# Patient Record
Sex: FEMALE | Race: WHITE | Employment: FULL TIME | URBAN - METROPOLITAN AREA
[De-identification: names, ages, dates, MRNs, and addresses within clinical notes are randomized per-mention and may not be internally consistent; named-entity substitution may affect disease eponyms.]

---

## 2017-01-06 RX ORDER — METFORMIN HYDROCHLORIDE 500 MG/1
TABLET, EXTENDED RELEASE ORAL
Qty: 540 TABLET | Refills: 0 | Status: SHIPPED | OUTPATIENT
Start: 2017-01-06 | End: 2017-01-12

## 2017-01-12 RX ORDER — METFORMIN HYDROCHLORIDE 500 MG/1
TABLET, EXTENDED RELEASE ORAL
Qty: 180 TABLET | Refills: 0 | Status: SHIPPED | OUTPATIENT
Start: 2017-01-12 | End: 2017-10-23

## 2017-02-09 ENCOUNTER — OFFICE VISIT (OUTPATIENT)
Dept: FAMILY MEDICINE CLINIC | Age: 42
End: 2017-02-09

## 2017-02-09 VITALS
BODY MASS INDEX: 45.99 KG/M2 | RESPIRATION RATE: 16 BRPM | HEIGHT: 67 IN | TEMPERATURE: 98.1 F | WEIGHT: 293 LBS | HEART RATE: 100 BPM | SYSTOLIC BLOOD PRESSURE: 130 MMHG | DIASTOLIC BLOOD PRESSURE: 80 MMHG

## 2017-02-09 DIAGNOSIS — G89.29 ELBOW PAIN, CHRONIC, RIGHT: Primary | ICD-10-CM

## 2017-02-09 DIAGNOSIS — M25.521 ELBOW PAIN, CHRONIC, RIGHT: Primary | ICD-10-CM

## 2017-02-09 PROCEDURE — 99213 OFFICE O/P EST LOW 20 MIN: CPT | Performed by: FAMILY MEDICINE

## 2017-03-02 ENCOUNTER — OFFICE VISIT (OUTPATIENT)
Dept: FAMILY MEDICINE CLINIC | Age: 42
End: 2017-03-02

## 2017-03-02 VITALS
WEIGHT: 286 LBS | RESPIRATION RATE: 16 BRPM | TEMPERATURE: 98.6 F | SYSTOLIC BLOOD PRESSURE: 118 MMHG | HEIGHT: 67 IN | DIASTOLIC BLOOD PRESSURE: 80 MMHG | BODY MASS INDEX: 44.89 KG/M2 | HEART RATE: 106 BPM

## 2017-03-02 DIAGNOSIS — G47.33 OBSTRUCTIVE APNEA: Chronic | ICD-10-CM

## 2017-03-02 DIAGNOSIS — Z00.00 WELL ADULT HEALTH CHECK: Primary | ICD-10-CM

## 2017-03-02 DIAGNOSIS — E66.01 OBESITY, CLASS III, BMI 40-49.9 (MORBID OBESITY) (HCC): ICD-10-CM

## 2017-03-02 DIAGNOSIS — E11.9 CONTROLLED TYPE 2 DIABETES MELLITUS WITHOUT COMPLICATION, WITHOUT LONG-TERM CURRENT USE OF INSULIN (HCC): Chronic | ICD-10-CM

## 2017-03-02 DIAGNOSIS — E78.5 HYPERLIPIDEMIA LDL GOAL <100: ICD-10-CM

## 2017-03-02 DIAGNOSIS — K21.9 GASTROESOPHAGEAL REFLUX DISEASE WITHOUT ESOPHAGITIS: Chronic | ICD-10-CM

## 2017-03-02 DIAGNOSIS — J45.30 MILD PERSISTENT ASTHMA WITHOUT COMPLICATION: Chronic | ICD-10-CM

## 2017-03-02 LAB
CREATININE URINE: 238.6 MG/DL (ref 28–259)
HBA1C MFR BLD: 6.5 %
MICROALBUMIN UR-MCNC: 4.6 MG/DL
MICROALBUMIN/CREAT UR-RTO: 19.3 MG/G (ref 0–30)

## 2017-03-02 PROCEDURE — 99395 PREV VISIT EST AGE 18-39: CPT | Performed by: FAMILY MEDICINE

## 2017-03-02 PROCEDURE — 83036 HEMOGLOBIN GLYCOSYLATED A1C: CPT | Performed by: FAMILY MEDICINE

## 2017-03-02 ASSESSMENT — PATIENT HEALTH QUESTIONNAIRE - PHQ9
SUM OF ALL RESPONSES TO PHQ9 QUESTIONS 1 & 2: 0
SUM OF ALL RESPONSES TO PHQ QUESTIONS 1-9: 0
1. LITTLE INTEREST OR PLEASURE IN DOING THINGS: 0
2. FEELING DOWN, DEPRESSED OR HOPELESS: 0

## 2017-03-03 PROBLEM — E11.21 TYPE 2 DIABETES MELLITUS WITH DIABETIC NEPHROPATHY (HCC): Status: ACTIVE | Noted: 2017-03-02

## 2017-03-03 RX ORDER — LISINOPRIL 10 MG/1
10 TABLET ORAL DAILY
Qty: 90 TABLET | Refills: 1 | Status: SHIPPED | OUTPATIENT
Start: 2017-03-03 | End: 2017-05-12

## 2017-03-06 ENCOUNTER — PATIENT MESSAGE (OUTPATIENT)
Dept: FAMILY MEDICINE CLINIC | Age: 42
End: 2017-03-06

## 2017-03-06 RX ORDER — DEXTROMETHORPHAN HYDROBROMIDE AND PROMETHAZINE HYDROCHLORIDE 15; 6.25 MG/5ML; MG/5ML
5 SYRUP ORAL 4 TIMES DAILY PRN
Qty: 240 ML | Refills: 0 | Status: SHIPPED | OUTPATIENT
Start: 2017-03-06 | End: 2017-03-13

## 2017-03-10 ENCOUNTER — OFFICE VISIT (OUTPATIENT)
Dept: FAMILY MEDICINE CLINIC | Age: 42
End: 2017-03-10

## 2017-03-10 VITALS
HEIGHT: 67 IN | BODY MASS INDEX: 45.2 KG/M2 | HEART RATE: 94 BPM | WEIGHT: 288 LBS | DIASTOLIC BLOOD PRESSURE: 72 MMHG | TEMPERATURE: 98.3 F | SYSTOLIC BLOOD PRESSURE: 122 MMHG | RESPIRATION RATE: 18 BRPM

## 2017-03-10 DIAGNOSIS — N93.8 DUB (DYSFUNCTIONAL UTERINE BLEEDING): Primary | ICD-10-CM

## 2017-03-10 PROCEDURE — 99213 OFFICE O/P EST LOW 20 MIN: CPT | Performed by: FAMILY MEDICINE

## 2017-05-06 RX ORDER — MONTELUKAST SODIUM 10 MG/1
TABLET ORAL
Qty: 90 TABLET | Refills: 0 | Status: SHIPPED | OUTPATIENT
Start: 2017-05-06 | End: 2017-07-19 | Stop reason: SDUPTHER

## 2017-05-11 ENCOUNTER — PATIENT MESSAGE (OUTPATIENT)
Dept: FAMILY MEDICINE CLINIC | Age: 42
End: 2017-05-11

## 2017-05-11 DIAGNOSIS — E11.21 TYPE 2 DIABETES MELLITUS WITH DIABETIC NEPHROPATHY, WITHOUT LONG-TERM CURRENT USE OF INSULIN (HCC): Primary | ICD-10-CM

## 2017-05-12 RX ORDER — LOSARTAN POTASSIUM 25 MG/1
25 TABLET ORAL DAILY
Qty: 30 TABLET | Refills: 3 | Status: SHIPPED | OUTPATIENT
Start: 2017-05-12 | End: 2017-08-20 | Stop reason: SDUPTHER

## 2017-06-19 ENCOUNTER — OFFICE VISIT (OUTPATIENT)
Dept: FAMILY MEDICINE CLINIC | Age: 42
End: 2017-06-19

## 2017-06-19 VITALS
HEIGHT: 67 IN | DIASTOLIC BLOOD PRESSURE: 70 MMHG | WEIGHT: 293 LBS | RESPIRATION RATE: 16 BRPM | BODY MASS INDEX: 45.99 KG/M2 | SYSTOLIC BLOOD PRESSURE: 124 MMHG | TEMPERATURE: 99.3 F | HEART RATE: 98 BPM

## 2017-06-19 DIAGNOSIS — Z11.4 SCREENING FOR HIV (HUMAN IMMUNODEFICIENCY VIRUS): ICD-10-CM

## 2017-06-19 DIAGNOSIS — E11.21 TYPE 2 DIABETES MELLITUS WITH DIABETIC NEPHROPATHY, WITHOUT LONG-TERM CURRENT USE OF INSULIN (HCC): Primary | ICD-10-CM

## 2017-06-19 DIAGNOSIS — E11.21 TYPE 2 DIABETES MELLITUS WITH DIABETIC NEPHROPATHY, WITHOUT LONG-TERM CURRENT USE OF INSULIN (HCC): ICD-10-CM

## 2017-06-19 DIAGNOSIS — J45.30 MILD PERSISTENT ASTHMA WITHOUT COMPLICATION: Chronic | ICD-10-CM

## 2017-06-19 DIAGNOSIS — E78.5 HYPERLIPIDEMIA LDL GOAL <100: ICD-10-CM

## 2017-06-19 LAB
A/G RATIO: 1.1 (ref 1.1–2.2)
ALBUMIN SERPL-MCNC: 3.9 G/DL (ref 3.4–5)
ALP BLD-CCNC: 95 U/L (ref 40–129)
ALT SERPL-CCNC: 9 U/L (ref 10–40)
ANION GAP SERPL CALCULATED.3IONS-SCNC: 17 MMOL/L (ref 3–16)
AST SERPL-CCNC: 9 U/L (ref 15–37)
BILIRUB SERPL-MCNC: 0.6 MG/DL (ref 0–1)
BUN BLDV-MCNC: 14 MG/DL (ref 7–20)
CALCIUM SERPL-MCNC: 9.4 MG/DL (ref 8.3–10.6)
CHLORIDE BLD-SCNC: 98 MMOL/L (ref 99–110)
CHOLESTEROL, TOTAL: 206 MG/DL (ref 0–199)
CO2: 22 MMOL/L (ref 21–32)
CREAT SERPL-MCNC: 0.6 MG/DL (ref 0.6–1.1)
GFR AFRICAN AMERICAN: >60
GFR NON-AFRICAN AMERICAN: >60
GLOBULIN: 3.4 G/DL
GLUCOSE BLD-MCNC: 161 MG/DL (ref 70–99)
HDLC SERPL-MCNC: 80 MG/DL (ref 40–60)
LDL CHOLESTEROL CALCULATED: 93 MG/DL
POTASSIUM SERPL-SCNC: 4 MMOL/L (ref 3.5–5.1)
SODIUM BLD-SCNC: 137 MMOL/L (ref 136–145)
TOTAL PROTEIN: 7.3 G/DL (ref 6.4–8.2)
TRIGL SERPL-MCNC: 163 MG/DL (ref 0–150)
VLDLC SERPL CALC-MCNC: 33 MG/DL

## 2017-06-19 PROCEDURE — 99214 OFFICE O/P EST MOD 30 MIN: CPT | Performed by: FAMILY MEDICINE

## 2017-06-20 LAB
ESTIMATED AVERAGE GLUCOSE: 151.3 MG/DL
HBA1C MFR BLD: 6.9 %
HIV-1 AND HIV-2 ANTIBODIES: REACTIVE

## 2017-06-22 ENCOUNTER — TELEPHONE (OUTPATIENT)
Dept: FAMILY MEDICINE CLINIC | Age: 42
End: 2017-06-22

## 2017-06-22 DIAGNOSIS — Z21 HIV TEST POSITIVE (HCC): ICD-10-CM

## 2017-06-22 DIAGNOSIS — Z21 HIV TEST POSITIVE (HCC): Primary | ICD-10-CM

## 2017-06-22 LAB — HIV-1 WESTERN BLOT: ABNORMAL

## 2017-06-23 ENCOUNTER — TELEPHONE (OUTPATIENT)
Dept: FAMILY MEDICINE CLINIC | Age: 42
End: 2017-06-23

## 2017-06-23 DIAGNOSIS — Z21 HIV TEST POSITIVE (HCC): Primary | ICD-10-CM

## 2017-07-06 ENCOUNTER — TELEPHONE (OUTPATIENT)
Dept: FAMILY MEDICINE CLINIC | Age: 42
End: 2017-07-06

## 2017-07-20 RX ORDER — MONTELUKAST SODIUM 10 MG/1
TABLET ORAL
Qty: 90 TABLET | Refills: 0 | Status: SHIPPED | OUTPATIENT
Start: 2017-07-20 | End: 2017-09-29 | Stop reason: SDUPTHER

## 2017-07-26 ENCOUNTER — OFFICE VISIT (OUTPATIENT)
Dept: FAMILY MEDICINE CLINIC | Age: 42
End: 2017-07-26

## 2017-07-26 VITALS
TEMPERATURE: 98.8 F | BODY MASS INDEX: 45.99 KG/M2 | RESPIRATION RATE: 18 BRPM | SYSTOLIC BLOOD PRESSURE: 126 MMHG | HEART RATE: 100 BPM | WEIGHT: 293 LBS | HEIGHT: 67 IN | DIASTOLIC BLOOD PRESSURE: 70 MMHG

## 2017-07-26 DIAGNOSIS — Z21 HIV TEST POSITIVE (HCC): ICD-10-CM

## 2017-07-26 DIAGNOSIS — Z78.9 FALSE POSITIVE SEROLOGY FOR HIV: ICD-10-CM

## 2017-07-26 DIAGNOSIS — N84.1 ENDOCERVICAL POLYP: Primary | ICD-10-CM

## 2017-07-26 DIAGNOSIS — Z01.419 WELL WOMAN EXAM WITH ROUTINE GYNECOLOGICAL EXAM: ICD-10-CM

## 2017-07-26 PROCEDURE — 99214 OFFICE O/P EST MOD 30 MIN: CPT | Performed by: FAMILY MEDICINE

## 2017-07-28 LAB
HPV COMMENT: NORMAL
HPV TYPE 16: NOT DETECTED
HPV TYPE 18: NOT DETECTED
HPVOH (OTHER TYPES): NOT DETECTED

## 2017-07-29 LAB
HIV RNA PCR INTERPRETATION: NOT DETECTED
HIV-1 RNA BY PCR, QN: <1.3 LOG
HIV-1 RNA BY PCR, QN: <20 CPY/ML

## 2017-07-30 PROBLEM — Z78.9 FALSE POSITIVE SEROLOGY FOR HIV: Status: ACTIVE | Noted: 2017-07-30

## 2017-08-11 ENCOUNTER — PATIENT MESSAGE (OUTPATIENT)
Dept: FAMILY MEDICINE CLINIC | Age: 42
End: 2017-08-11

## 2017-08-20 RX ORDER — LOSARTAN POTASSIUM 25 MG/1
TABLET ORAL
Qty: 30 TABLET | Refills: 3 | Status: SHIPPED | OUTPATIENT
Start: 2017-08-20 | End: 2017-11-22 | Stop reason: SDUPTHER

## 2017-09-29 RX ORDER — MONTELUKAST SODIUM 10 MG/1
TABLET ORAL
Qty: 90 TABLET | Refills: 0 | Status: SHIPPED | OUTPATIENT
Start: 2017-09-29 | End: 2017-12-15 | Stop reason: SDUPTHER

## 2017-10-23 RX ORDER — METFORMIN HYDROCHLORIDE 500 MG/1
TABLET, EXTENDED RELEASE ORAL
Qty: 180 TABLET | Refills: 0 | Status: SHIPPED | OUTPATIENT
Start: 2017-10-23 | End: 2018-01-03 | Stop reason: SDUPTHER

## 2017-10-27 RX ORDER — PANTOPRAZOLE SODIUM 40 MG/1
40 TABLET, DELAYED RELEASE ORAL DAILY
Qty: 90 TABLET | Refills: 0 | Status: SHIPPED | OUTPATIENT
Start: 2017-10-27 | End: 2018-01-11 | Stop reason: SDUPTHER

## 2017-10-31 ENCOUNTER — OFFICE VISIT (OUTPATIENT)
Dept: SLEEP MEDICINE | Age: 42
End: 2017-10-31

## 2017-10-31 VITALS
SYSTOLIC BLOOD PRESSURE: 122 MMHG | BODY MASS INDEX: 45.99 KG/M2 | WEIGHT: 293 LBS | OXYGEN SATURATION: 98 % | HEIGHT: 67 IN | HEART RATE: 115 BPM | DIASTOLIC BLOOD PRESSURE: 86 MMHG

## 2017-10-31 DIAGNOSIS — J45.30 MILD PERSISTENT ASTHMA WITHOUT COMPLICATION: Chronic | ICD-10-CM

## 2017-10-31 DIAGNOSIS — J30.2 OTHER SEASONAL ALLERGIC RHINITIS: Chronic | ICD-10-CM

## 2017-10-31 DIAGNOSIS — G47.33 OBSTRUCTIVE SLEEP APNEA SYNDROME: Primary | Chronic | ICD-10-CM

## 2017-10-31 DIAGNOSIS — E66.01 MORBID OBESITY, UNSPECIFIED OBESITY TYPE (HCC): Chronic | ICD-10-CM

## 2017-10-31 DIAGNOSIS — E11.9 CONTROLLED TYPE 2 DIABETES MELLITUS WITHOUT COMPLICATION, WITHOUT LONG-TERM CURRENT USE OF INSULIN (HCC): Chronic | ICD-10-CM

## 2017-10-31 PROCEDURE — 99214 OFFICE O/P EST MOD 30 MIN: CPT | Performed by: NURSE PRACTITIONER

## 2017-10-31 ASSESSMENT — ENCOUNTER SYMPTOMS
ABDOMINAL DISTENTION: 0
SINUS PRESSURE: 0
SHORTNESS OF BREATH: 0
ABDOMINAL PAIN: 0
RHINORRHEA: 0
COUGH: 0
APNEA: 0

## 2017-10-31 ASSESSMENT — SLEEP AND FATIGUE QUESTIONNAIRES
HOW LIKELY ARE YOU TO NOD OFF OR FALL ASLEEP WHILE SITTING AND READING: 1
HOW LIKELY ARE YOU TO NOD OFF OR FALL ASLEEP WHILE SITTING INACTIVE IN A PUBLIC PLACE: 0
HOW LIKELY ARE YOU TO NOD OFF OR FALL ASLEEP WHILE SITTING AND TALKING TO SOMEONE: 0
HOW LIKELY ARE YOU TO NOD OFF OR FALL ASLEEP IN A CAR, WHILE STOPPED FOR A FEW MINUTES IN TRAFFIC: 0
ESS TOTAL SCORE: 6
HOW LIKELY ARE YOU TO NOD OFF OR FALL ASLEEP WHILE SITTING QUIETLY AFTER LUNCH WITHOUT ALCOHOL: 0
HOW LIKELY ARE YOU TO NOD OFF OR FALL ASLEEP WHILE WATCHING TV: 1
HOW LIKELY ARE YOU TO NOD OFF OR FALL ASLEEP WHEN YOU ARE A PASSENGER IN A CAR FOR AN HOUR WITHOUT A BREAK: 2
HOW LIKELY ARE YOU TO NOD OFF OR FALL ASLEEP WHILE LYING DOWN TO REST IN THE AFTERNOON WHEN CIRCUMSTANCES PERMIT: 2

## 2017-10-31 NOTE — PROGRESS NOTES
congestion, nosebleeds, rhinorrhea and sinus pressure. Respiratory: Negative for apnea, cough and shortness of breath. Cardiovascular: Negative for chest pain and palpitations. Gastrointestinal: Negative for abdominal distention and abdominal pain. Neurological: Negative for dizziness and headaches. Social History     Social History    Marital status:      Spouse name: N/A    Number of children: N/A    Years of education: N/A     Occupational History          Social History Main Topics    Smoking status: Never Smoker    Smokeless tobacco: Never Used      Comment: advised not to start    Alcohol use 0.6 oz/week     1 Cans of beer per week    Drug use: No    Sexual activity: Yes     Partners: Male     Birth control/ protection: Pill     Other Topics Concern    Not on file     Social History Narrative    Lives with spouse. Exercise:  Yoga, machines. Seatbelt use: Always       Prior to Admission medications    Medication Sig Start Date End Date Taking?  Authorizing Provider   pantoprazole (PROTONIX) 40 MG tablet TAKE 1 TABLET BY MOUTH DAILY 10/27/17  Yes Hailey North CNP   metFORMIN (GLUCOPHAGE-XR) 500 MG extended release tablet TAKE 2 TABLETS BY MOUTH DAILY WITH BREAKFAST( GENERIC EQUIVALENT FOR GLUCOPHAGE-XR) 10/23/17  Yes Leena Somers MD   montelukast (SINGULAIR) 10 MG tablet TAKE 1 TABLET BY MOUTH EVERY EVENING 9/29/17  Yes Niki Castro MD   losartan (COZAAR) 25 MG tablet TAKE 1 TABLET BY MOUTH DAILY 8/20/17  Yes Leena Somers MD   RECLIPSEN 0.15-30 MG-MCG per tablet TAKE 1 TABLET BY MOUTH DAILY 3/15/17  Yes Leena Somers MD   albuterol sulfate  (90 BASE) MCG/ACT inhaler Inhale 2 puffs into the lungs every 4 hours as needed for Wheezing May substitute for insurance preferred (Ventolin, Proventil, ProAir) 12/16/16 12/16/17 Yes Leena Somers MD   Chlorhexidine Gluconate 2 % LIQD Daily body wash for 14 d 7/15/16  Yes Leena Somers MD   fluticasone (FLONASE) 50 MCG/ACT nasal spray 1 spray by Nasal route daily 11/5/15 2/9/26 Yes Deepa Berkowitz MD   loratadine (CLARITIN) 10 MG tablet Take 10 mg by mouth daily. Yes Historical Provider, MD       Allergies as of 10/31/2017 - Review Complete 10/31/2017   Allergen Reaction Noted    Lisinopril Other (See Comments) 05/12/2017    Sulfa antibiotics Rash 05/04/2011       Patient Active Problem List   Diagnosis    Seborrhea    Dysfunctional uterine bleeding    Abnormal Pap smear- LGSIL 2001    Candidal intertrigo    Urticaria, idiopathic    Low back pain    Allergic rhinitis    Asthma- mild persistent    GERD (gastroesophageal reflux disease)    Well adult health check    Hiatal hernia    Screening for cervical cancer    Obesity, Class III, BMI 40-49.9 (morbid obesity) ([address]    Obstructive apnea- CPAP    Hyperlipidemia LDL goal <100    Type 2 diabetes mellitus with diabetic nephropathy (HCC)    Endocervical polyp    False positive serology for HIV       Past Medical History:   Diagnosis Date    Abnormal Pap smear- LGSIL 2001 5/4/2011    2001, nl since    Chronic folliculitis 9/26/8802    Dysfunctional uterine bleeding     False positive serology for HIV 7/30/2017    Obstructive apnea     Seborrhea     Urticaria, idiopathic        Past Surgical History:   Procedure Laterality Date    MOUTH SURGERY  2016    gingival auto graft       Family History   Problem Relation Age of Onset    Cancer Sister      peritoneal    Diabetes Maternal Grandmother     Other Brother      SARA       Vitals:  Weight BMI   Wt Readings from Last 3 Encounters:   10/31/17 299 lb (135.6 kg)   07/26/17 297 lb (134.7 kg)   06/19/17 298 lb (135.2 kg)    Body mass index is 46.83 kg/m².      BP HR SaO2   BP Readings from Last 3 Encounters:   10/31/17 122/86   07/26/17 126/70   06/19/17 124/70    Pulse Readings from Last 3 Encounters:   10/31/17 115   07/26/17 100   06/19/17 98    SpO2 Readings from Last 3 Encounters:   10/31/17 98%

## 2017-11-22 RX ORDER — LOSARTAN POTASSIUM 25 MG/1
TABLET ORAL
Qty: 30 TABLET | Refills: 0 | Status: SHIPPED | OUTPATIENT
Start: 2017-11-22 | End: 2017-12-20 | Stop reason: SDUPTHER

## 2017-11-30 RX ORDER — DESOGESTREL AND ETHINYL ESTRADIOL 0.15-0.03
KIT ORAL
Qty: 84 TABLET | Refills: 0 | Status: SHIPPED | OUTPATIENT
Start: 2017-11-30 | End: 2018-02-07 | Stop reason: SDUPTHER

## 2017-12-15 RX ORDER — MONTELUKAST SODIUM 10 MG/1
TABLET ORAL
Qty: 90 TABLET | Refills: 1 | Status: SHIPPED | OUTPATIENT
Start: 2017-12-15 | End: 2018-06-12 | Stop reason: SDUPTHER

## 2017-12-20 RX ORDER — LOSARTAN POTASSIUM 25 MG/1
TABLET ORAL
Qty: 30 TABLET | Refills: 0 | Status: SHIPPED | OUTPATIENT
Start: 2017-12-20 | End: 2018-01-13 | Stop reason: SDUPTHER

## 2018-01-03 RX ORDER — METFORMIN HYDROCHLORIDE 500 MG/1
TABLET, EXTENDED RELEASE ORAL
Qty: 180 TABLET | Refills: 0 | Status: SHIPPED | OUTPATIENT
Start: 2018-01-03 | End: 2018-05-03 | Stop reason: SDUPTHER

## 2018-01-12 RX ORDER — PANTOPRAZOLE SODIUM 40 MG/1
40 TABLET, DELAYED RELEASE ORAL DAILY
Qty: 90 TABLET | Refills: 0 | Status: SHIPPED | OUTPATIENT
Start: 2018-01-12 | End: 2019-05-04 | Stop reason: SDUPTHER

## 2018-01-15 RX ORDER — LOSARTAN POTASSIUM 25 MG/1
TABLET ORAL
Qty: 30 TABLET | Refills: 0 | Status: SHIPPED | OUTPATIENT
Start: 2018-01-15 | End: 2018-02-07 | Stop reason: SDUPTHER

## 2018-01-18 ENCOUNTER — OFFICE VISIT (OUTPATIENT)
Dept: FAMILY MEDICINE CLINIC | Age: 43
End: 2018-01-18

## 2018-01-18 VITALS
HEART RATE: 110 BPM | BODY MASS INDEX: 45.99 KG/M2 | WEIGHT: 293 LBS | SYSTOLIC BLOOD PRESSURE: 130 MMHG | DIASTOLIC BLOOD PRESSURE: 80 MMHG | RESPIRATION RATE: 18 BRPM | HEIGHT: 67 IN | TEMPERATURE: 98.4 F

## 2018-01-18 DIAGNOSIS — L30.4 INTERTRIGO: ICD-10-CM

## 2018-01-18 DIAGNOSIS — J06.9 VIRAL URI: Primary | ICD-10-CM

## 2018-01-18 DIAGNOSIS — J45.20 MILD INTERMITTENT ASTHMA WITHOUT COMPLICATION: Chronic | ICD-10-CM

## 2018-01-18 PROCEDURE — 99213 OFFICE O/P EST LOW 20 MIN: CPT | Performed by: FAMILY MEDICINE

## 2018-01-18 RX ORDER — CLOTRIMAZOLE AND BETAMETHASONE DIPROPIONATE 10; .64 MG/G; MG/G
CREAM TOPICAL
Qty: 60 G | Refills: 0 | Status: SHIPPED | OUTPATIENT
Start: 2018-01-18 | End: 2021-01-19 | Stop reason: SDUPTHER

## 2018-01-18 RX ORDER — DEXTROMETHORPHAN HYDROBROMIDE AND PROMETHAZINE HYDROCHLORIDE 15; 6.25 MG/5ML; MG/5ML
5 SYRUP ORAL 4 TIMES DAILY PRN
Qty: 240 ML | Refills: 0 | Status: SHIPPED | OUTPATIENT
Start: 2018-01-18 | End: 2018-01-25

## 2018-01-18 NOTE — PROGRESS NOTES
Readings from Last 3 Encounters:   01/18/18 130/80   10/31/17 122/86   07/26/17 126/70     GENERAL: well-developed, well-nourished, alert, no distress  EYES: negative findings: lids and lashes normal and conjunctivae and sclerae normal  ENT: normal TM's and external ear canals both ears  · External nose and ears appear normal  · Pharynx: red. Exudates: None  · Lips, mucosa, and tongue normal and teeth normal  · Hearing grossly normal  NECK: Supple, symmetrical, trachea midline  · Thyroid not enlarged, symmetric, no tenderness/mass/nodules  · no cervical nodes, no supraclavicular nodes  LUNGS:  Breathing unlabored  · clear to auscultation bilaterally,  good air movement  CARDIOVASC: regular rate and rhythm    Assessment/Plan:     1. Viral URI  promethazine-dextromethorphan (PROMETHAZINE-DM) 6.25-15 MG/5ML syrup   2. Mild intermittent asthma without complication  promethazine-dextromethorphan (PROMETHAZINE-DM) 6.25-15 MG/5ML syrup   3. Intertrigo  clotrimazole-betamethasone (LOTRISONE) 1-0.05 % cream   Explained lack of efficacy of antibiotics in viral disease. May take Mucinex (guaifenisen) and Robitussin DM (guafenisen, dextromethorphan) for cough and congestion. May also try Vicks Vaporub. May take ibuprofen (Motrin, Advil) 200 mg tabs up to 4 tabs every 8 hours. May also take acetaminophen (Tylenol) as instructed on packaging. Please call if symptoms getting worse. Please call if cold symptoms getting worse after 7 days from onset, last longer than 10 days, having high fevers, having trouble breathing in chest or extremely ill.

## 2018-01-31 ENCOUNTER — OFFICE VISIT (OUTPATIENT)
Dept: FAMILY MEDICINE CLINIC | Age: 43
End: 2018-01-31

## 2018-01-31 VITALS
DIASTOLIC BLOOD PRESSURE: 80 MMHG | WEIGHT: 293 LBS | HEIGHT: 67 IN | RESPIRATION RATE: 16 BRPM | HEART RATE: 86 BPM | BODY MASS INDEX: 45.99 KG/M2 | SYSTOLIC BLOOD PRESSURE: 124 MMHG | TEMPERATURE: 98.6 F

## 2018-01-31 DIAGNOSIS — R21 RASH AND NONSPECIFIC SKIN ERUPTION: ICD-10-CM

## 2018-01-31 DIAGNOSIS — J30.9 CHRONIC ALLERGIC RHINITIS, UNSPECIFIED SEASONALITY, UNSPECIFIED TRIGGER: Chronic | ICD-10-CM

## 2018-01-31 DIAGNOSIS — G47.33 OBSTRUCTIVE APNEA: Chronic | ICD-10-CM

## 2018-01-31 DIAGNOSIS — R19.5 CHANGE IN CONSISTENCY OF STOOL: ICD-10-CM

## 2018-01-31 DIAGNOSIS — R19.7 INTERMITTENT DIARRHEA: ICD-10-CM

## 2018-01-31 DIAGNOSIS — E78.5 HYPERLIPIDEMIA LDL GOAL <100: ICD-10-CM

## 2018-01-31 DIAGNOSIS — J45.20 MILD INTERMITTENT ASTHMA WITHOUT COMPLICATION: Chronic | ICD-10-CM

## 2018-01-31 DIAGNOSIS — E11.21 TYPE 2 DIABETES MELLITUS WITH DIABETIC NEPHROPATHY, WITHOUT LONG-TERM CURRENT USE OF INSULIN (HCC): Primary | ICD-10-CM

## 2018-01-31 LAB — HBA1C MFR BLD: 6.8 %

## 2018-01-31 PROCEDURE — 99214 OFFICE O/P EST MOD 30 MIN: CPT | Performed by: FAMILY MEDICINE

## 2018-01-31 PROCEDURE — 83036 HEMOGLOBIN GLYCOSYLATED A1C: CPT | Performed by: FAMILY MEDICINE

## 2018-01-31 RX ORDER — DICYCLOMINE HCL 20 MG
20 TABLET ORAL 3 TIMES DAILY PRN
Qty: 30 TABLET | Refills: 0 | Status: SHIPPED | OUTPATIENT
Start: 2018-01-31 | End: 2018-08-30 | Stop reason: SDUPTHER

## 2018-01-31 RX ORDER — ASPIRIN 81 MG/1
81 TABLET ORAL DAILY
Qty: 30 TABLET | Refills: 11 | COMMUNITY
Start: 2018-01-31 | End: 2019-06-18

## 2018-01-31 RX ORDER — CLEMASTINE FUMARATE 1.34 MG
1.34 TABLET ORAL DAILY PRN
Qty: 30 TABLET | Refills: 3 | Status: SHIPPED | OUTPATIENT
Start: 2018-01-31 | End: 2018-04-19 | Stop reason: ALTCHOICE

## 2018-01-31 RX ORDER — FEXOFENADINE HCL 180 MG/1
180 TABLET ORAL DAILY
COMMUNITY
End: 2018-04-19 | Stop reason: SINTOL

## 2018-01-31 NOTE — PATIENT INSTRUCTIONS
IBS?  No. Foods don't cause IBS. But some foods may make you feel worse. Foods that may make symptoms worse include the following:  Drinks with caffeine, such as coffee, tea or soda   Milk products   Alcohol   Chocolate   Wheat, rye or barley   Keeping a diary for a few weeks may be a good way to find out if a food bothers you. Record what you eat and what your symptoms are. If you notice a pattern or think a food makes you feel worse, don't eat it. But don't cut out foods unless they have caused you problems more than once. If gas is a problem for you, you might want to avoid foods that tend to make gas worse. These include beans, cabbage and some fruits. If milk and other dairy products bother you, you may have lactose intolerance. Lactose intolerance means that your body can't digest lactose (the sugar in milk). If this seems to be the case, you may need to limit the amount of milk and milk products in your diet. Talk to your family doctor if you think you have trouble digesting dairy products. How can stress affect IBS? Stress may trigger symptoms in people who have IBS. Talk to your family doctor about ways to deal with stress, such as exercise, relaxation training or meditation. He or she may have some suggestions or may refer you to someone who can give you some ideas. Your doctor may also suggest that you talk to a counselor about things that are bothering you. Diagnosis & Tests   How is IBS diagnosed? Your doctor may start by asking you questions about your symptoms. If your symptoms have had a pattern over time, the pattern may make it clear to your doctor that IBS is the cause. If your symptoms have just started, something else may be the cause. Your doctor may need to do some tests, such as a blood test or colonoscopy, to make sure that your symptoms aren't caused by something else. Treatment   How is IBS treated? There is no cure for IBS.  The best way to handle your symptoms is to life.    What if IBS interferes with my daily activities? IBS may have caused you to avoid doing certain things, like going out or going to work or school. While it may take some time for your efforts to pay off, you may find new freedom by following a plan that includes a healthy diet, learning new ways to deal with your stress and avoiding foods that may make your symptoms worse. Tips on controlling IBS  Eat a varied healthy diet and avoid foods high in fat. Drink plenty of water. Try eating 6 small meals a day rather than 3 larger ones. Learn new and better ways to deal with your stress. Avoid using laxatives. They may weaken your intestines and cause you to be dependent on them.

## 2018-02-07 RX ORDER — LOSARTAN POTASSIUM 25 MG/1
TABLET ORAL
Qty: 30 TABLET | Refills: 5 | Status: SHIPPED | OUTPATIENT
Start: 2018-02-07 | End: 2018-09-26 | Stop reason: SDUPTHER

## 2018-02-07 RX ORDER — DESOGESTREL AND ETHINYL ESTRADIOL 0.15-0.03
KIT ORAL
Qty: 84 TABLET | Refills: 1 | Status: SHIPPED | OUTPATIENT
Start: 2018-02-07 | End: 2018-10-21 | Stop reason: SDUPTHER

## 2018-03-28 RX ORDER — LANCETS
EACH MISCELLANEOUS
Qty: 100 EACH | Refills: 3 | Status: SHIPPED | OUTPATIENT
Start: 2018-03-28 | End: 2018-04-23 | Stop reason: SDUPTHER

## 2018-04-09 ENCOUNTER — OFFICE VISIT (OUTPATIENT)
Dept: FAMILY MEDICINE CLINIC | Age: 43
End: 2018-04-09

## 2018-04-09 VITALS
BODY MASS INDEX: 47.14 KG/M2 | HEART RATE: 108 BPM | RESPIRATION RATE: 12 BRPM | DIASTOLIC BLOOD PRESSURE: 80 MMHG | SYSTOLIC BLOOD PRESSURE: 126 MMHG | TEMPERATURE: 98.3 F | WEIGHT: 293 LBS | OXYGEN SATURATION: 98 %

## 2018-04-09 DIAGNOSIS — S46.812A STRAIN OF LEFT TRAPEZIUS MUSCLE, INITIAL ENCOUNTER: ICD-10-CM

## 2018-04-09 DIAGNOSIS — J40 BRONCHITIS: Primary | ICD-10-CM

## 2018-04-09 DIAGNOSIS — J45.21 MILD INTERMITTENT ASTHMA WITH ACUTE EXACERBATION: Chronic | ICD-10-CM

## 2018-04-09 PROCEDURE — 99214 OFFICE O/P EST MOD 30 MIN: CPT | Performed by: NURSE PRACTITIONER

## 2018-04-09 RX ORDER — PREDNISONE 20 MG/1
20 TABLET ORAL 2 TIMES DAILY
Qty: 10 TABLET | Refills: 0 | Status: SHIPPED | OUTPATIENT
Start: 2018-04-09 | End: 2018-04-14

## 2018-04-09 RX ORDER — AZITHROMYCIN 250 MG/1
TABLET, FILM COATED ORAL
Qty: 1 PACKET | Refills: 0 | Status: SHIPPED | OUTPATIENT
Start: 2018-04-09 | End: 2018-04-19 | Stop reason: ALTCHOICE

## 2018-04-09 ASSESSMENT — ENCOUNTER SYMPTOMS
WHEEZING: 1
VOMITING: 0
DIARRHEA: 0
RHINORRHEA: 1
SHORTNESS OF BREATH: 1
SINUS PRESSURE: 1
NAUSEA: 0
COUGH: 1
SORE THROAT: 0

## 2018-04-10 ENCOUNTER — PAT TELEPHONE (OUTPATIENT)
Dept: PREADMISSION TESTING | Age: 43
End: 2018-04-10

## 2018-04-10 VITALS — BODY MASS INDEX: 45.99 KG/M2 | HEIGHT: 67 IN | WEIGHT: 293 LBS

## 2018-04-12 ENCOUNTER — HOSPITAL ENCOUNTER (OUTPATIENT)
Dept: ENDOSCOPY | Age: 43
Discharge: HOME | End: 2018-04-12
Attending: INTERNAL MEDICINE | Admitting: INTERNAL MEDICINE

## 2018-04-12 VITALS
DIASTOLIC BLOOD PRESSURE: 92 MMHG | TEMPERATURE: 97.5 F | HEIGHT: 67 IN | OXYGEN SATURATION: 98 % | WEIGHT: 293 LBS | RESPIRATION RATE: 18 BRPM | SYSTOLIC BLOOD PRESSURE: 149 MMHG | BODY MASS INDEX: 45.99 KG/M2 | HEART RATE: 102 BPM

## 2018-04-12 LAB
GLUCOSE BLD-MCNC: 100 MG/DL (ref 70–99)
GLUCOSE BLD-MCNC: 135 MG/DL (ref 70–99)
PERFORMED ON: ABNORMAL
PERFORMED ON: ABNORMAL
PREGNANCY, URINE: NEGATIVE

## 2018-04-12 RX ORDER — ONDANSETRON 2 MG/ML
4 INJECTION INTRAMUSCULAR; INTRAVENOUS
Status: ACTIVE | OUTPATIENT
Start: 2018-04-12 | End: 2018-04-12

## 2018-04-12 RX ORDER — SODIUM CHLORIDE 0.9 % (FLUSH) 0.9 %
10 SYRINGE (ML) INJECTION PRN
Status: DISCONTINUED | OUTPATIENT
Start: 2018-04-12 | End: 2018-04-13 | Stop reason: HOSPADM

## 2018-04-12 RX ORDER — SODIUM CHLORIDE 9 MG/ML
INJECTION, SOLUTION INTRAVENOUS CONTINUOUS
Status: DISCONTINUED | OUTPATIENT
Start: 2018-04-12 | End: 2018-04-13 | Stop reason: HOSPADM

## 2018-04-12 RX ORDER — SODIUM CHLORIDE 0.9 % (FLUSH) 0.9 %
10 SYRINGE (ML) INJECTION EVERY 12 HOURS SCHEDULED
Status: DISCONTINUED | OUTPATIENT
Start: 2018-04-12 | End: 2018-04-13 | Stop reason: HOSPADM

## 2018-04-12 RX ORDER — PROMETHAZINE HYDROCHLORIDE 25 MG/ML
6.25 INJECTION, SOLUTION INTRAMUSCULAR; INTRAVENOUS
Status: ACTIVE | OUTPATIENT
Start: 2018-04-12 | End: 2018-04-12

## 2018-04-12 RX ORDER — LABETALOL HYDROCHLORIDE 5 MG/ML
5 INJECTION, SOLUTION INTRAVENOUS EVERY 10 MIN PRN
Status: DISCONTINUED | OUTPATIENT
Start: 2018-04-12 | End: 2018-04-13 | Stop reason: HOSPADM

## 2018-04-12 RX ADMIN — SODIUM CHLORIDE: 9 INJECTION, SOLUTION INTRAVENOUS at 13:44

## 2018-04-12 ASSESSMENT — PAIN SCALES - GENERAL
PAINLEVEL_OUTOF10: 0
PAINLEVEL_OUTOF10: 0

## 2018-04-12 ASSESSMENT — PAIN - FUNCTIONAL ASSESSMENT: PAIN_FUNCTIONAL_ASSESSMENT: 0-10

## 2018-04-12 ASSESSMENT — PAIN DESCRIPTION - PAIN TYPE
TYPE: SURGICAL PAIN
TYPE: SURGICAL PAIN

## 2018-04-13 ENCOUNTER — PATIENT MESSAGE (OUTPATIENT)
Dept: FAMILY MEDICINE CLINIC | Age: 43
End: 2018-04-13

## 2018-04-13 PROBLEM — Z98.890 H/O COLONOSCOPY: Status: ACTIVE | Noted: 2018-04-13

## 2018-04-16 RX ORDER — CYCLOBENZAPRINE HCL 10 MG
10 TABLET ORAL NIGHTLY PRN
Qty: 10 TABLET | Refills: 0 | Status: SHIPPED | OUTPATIENT
Start: 2018-04-16 | End: 2018-04-26

## 2018-04-18 ENCOUNTER — TELEPHONE (OUTPATIENT)
Dept: FAMILY MEDICINE CLINIC | Age: 43
End: 2018-04-18

## 2018-04-18 DIAGNOSIS — E11.69 TYPE 2 DIABETES MELLITUS WITH OTHER SPECIFIED COMPLICATION, WITHOUT LONG-TERM CURRENT USE OF INSULIN (HCC): Primary | ICD-10-CM

## 2018-04-18 DIAGNOSIS — E78.5 HYPERLIPIDEMIA LDL GOAL <100: ICD-10-CM

## 2018-04-23 DIAGNOSIS — E11.21 TYPE 2 DIABETES MELLITUS WITH DIABETIC NEPHROPATHY, WITHOUT LONG-TERM CURRENT USE OF INSULIN (HCC): Primary | ICD-10-CM

## 2018-04-23 RX ORDER — LANCETS
EACH MISCELLANEOUS
Qty: 100 EACH | Refills: 3 | Status: SHIPPED | OUTPATIENT
Start: 2018-04-23 | End: 2018-08-03

## 2018-05-02 DIAGNOSIS — E11.69 TYPE 2 DIABETES MELLITUS WITH OTHER SPECIFIED COMPLICATION, WITHOUT LONG-TERM CURRENT USE OF INSULIN (HCC): ICD-10-CM

## 2018-05-02 DIAGNOSIS — E78.5 HYPERLIPIDEMIA LDL GOAL <100: ICD-10-CM

## 2018-05-02 PROBLEM — K63.5 COLON POLYP: Status: ACTIVE | Noted: 2018-05-02

## 2018-05-02 LAB
A/G RATIO: 1.2 (ref 1.1–2.2)
ALBUMIN SERPL-MCNC: 3.8 G/DL (ref 3.4–5)
ALP BLD-CCNC: 87 U/L (ref 40–129)
ALT SERPL-CCNC: 9 U/L (ref 10–40)
ANION GAP SERPL CALCULATED.3IONS-SCNC: 19 MMOL/L (ref 3–16)
AST SERPL-CCNC: 11 U/L (ref 15–37)
BILIRUB SERPL-MCNC: 0.3 MG/DL (ref 0–1)
BUN BLDV-MCNC: 13 MG/DL (ref 7–20)
CALCIUM SERPL-MCNC: 9.2 MG/DL (ref 8.3–10.6)
CHLORIDE BLD-SCNC: 98 MMOL/L (ref 99–110)
CHOLESTEROL, TOTAL: 209 MG/DL (ref 0–199)
CO2: 21 MMOL/L (ref 21–32)
CREAT SERPL-MCNC: 0.6 MG/DL (ref 0.6–1.1)
CREATININE URINE: 128.9 MG/DL (ref 28–259)
ESTIMATED AVERAGE GLUCOSE: 157.1 MG/DL
GFR AFRICAN AMERICAN: >60
GFR NON-AFRICAN AMERICAN: >60
GLOBULIN: 3.2 G/DL
GLUCOSE BLD-MCNC: 165 MG/DL (ref 70–99)
HBA1C MFR BLD: 7.1 %
HDLC SERPL-MCNC: 76 MG/DL (ref 40–60)
LDL CHOLESTEROL CALCULATED: 101 MG/DL
MICROALBUMIN UR-MCNC: 1.8 MG/DL
MICROALBUMIN/CREAT UR-RTO: 14 MG/G (ref 0–30)
POTASSIUM SERPL-SCNC: 4.3 MMOL/L (ref 3.5–5.1)
SODIUM BLD-SCNC: 138 MMOL/L (ref 136–145)
TOTAL PROTEIN: 7 G/DL (ref 6.4–8.2)
TRIGL SERPL-MCNC: 160 MG/DL (ref 0–150)
VLDLC SERPL CALC-MCNC: 32 MG/DL

## 2018-05-03 ENCOUNTER — OFFICE VISIT (OUTPATIENT)
Dept: FAMILY MEDICINE CLINIC | Age: 43
End: 2018-05-03

## 2018-05-03 VITALS
WEIGHT: 293 LBS | HEART RATE: 102 BPM | BODY MASS INDEX: 45.99 KG/M2 | HEIGHT: 67 IN | DIASTOLIC BLOOD PRESSURE: 80 MMHG | SYSTOLIC BLOOD PRESSURE: 130 MMHG | TEMPERATURE: 98.9 F | RESPIRATION RATE: 18 BRPM

## 2018-05-03 DIAGNOSIS — K58.0 IRRITABLE BOWEL SYNDROME WITH DIARRHEA: ICD-10-CM

## 2018-05-03 DIAGNOSIS — E66.01 OBESITY, CLASS III, BMI 40-49.9 (MORBID OBESITY) (HCC): ICD-10-CM

## 2018-05-03 DIAGNOSIS — J45.21 MILD INTERMITTENT ASTHMA WITH ACUTE EXACERBATION: Chronic | ICD-10-CM

## 2018-05-03 DIAGNOSIS — K63.5 POLYP OF COLON, UNSPECIFIED PART OF COLON, UNSPECIFIED TYPE: ICD-10-CM

## 2018-05-03 DIAGNOSIS — Z00.00 WELL ADULT HEALTH CHECK: Primary | ICD-10-CM

## 2018-05-03 DIAGNOSIS — E78.5 HYPERLIPIDEMIA LDL GOAL <100: ICD-10-CM

## 2018-05-03 DIAGNOSIS — E11.21 TYPE 2 DIABETES MELLITUS WITH DIABETIC NEPHROPATHY, WITHOUT LONG-TERM CURRENT USE OF INSULIN (HCC): ICD-10-CM

## 2018-05-03 DIAGNOSIS — G47.33 OBSTRUCTIVE APNEA: Chronic | ICD-10-CM

## 2018-05-03 DIAGNOSIS — Z98.890 H/O COLONOSCOPY: ICD-10-CM

## 2018-05-03 DIAGNOSIS — L50.1 URTICARIA, IDIOPATHIC: ICD-10-CM

## 2018-05-03 PROCEDURE — 99396 PREV VISIT EST AGE 40-64: CPT | Performed by: FAMILY MEDICINE

## 2018-05-03 RX ORDER — METFORMIN HYDROCHLORIDE 500 MG/1
2000 TABLET, EXTENDED RELEASE ORAL
Qty: 360 TABLET | Refills: 1 | Status: SHIPPED | OUTPATIENT
Start: 2018-05-03 | End: 2018-11-19 | Stop reason: SDUPTHER

## 2018-05-03 ASSESSMENT — PATIENT HEALTH QUESTIONNAIRE - PHQ9
1. LITTLE INTEREST OR PLEASURE IN DOING THINGS: 1
SUM OF ALL RESPONSES TO PHQ9 QUESTIONS 1 & 2: 2
2. FEELING DOWN, DEPRESSED OR HOPELESS: 1
SUM OF ALL RESPONSES TO PHQ QUESTIONS 1-9: 2

## 2018-06-12 RX ORDER — MONTELUKAST SODIUM 10 MG/1
TABLET ORAL
Qty: 90 TABLET | Refills: 0 | Status: SHIPPED | OUTPATIENT
Start: 2018-06-12 | End: 2018-10-23 | Stop reason: SDUPTHER

## 2018-07-24 ENCOUNTER — OFFICE VISIT (OUTPATIENT)
Dept: FAMILY MEDICINE CLINIC | Age: 43
End: 2018-07-24

## 2018-07-24 VITALS
DIASTOLIC BLOOD PRESSURE: 70 MMHG | HEART RATE: 92 BPM | WEIGHT: 293 LBS | BODY MASS INDEX: 47.46 KG/M2 | SYSTOLIC BLOOD PRESSURE: 110 MMHG | RESPIRATION RATE: 18 BRPM

## 2018-07-24 DIAGNOSIS — L30.9 DERMATITIS: ICD-10-CM

## 2018-07-24 PROCEDURE — 99213 OFFICE O/P EST LOW 20 MIN: CPT | Performed by: NURSE PRACTITIONER

## 2018-07-24 ASSESSMENT — ENCOUNTER SYMPTOMS
COUGH: 0
SHORTNESS OF BREATH: 0
VOMITING: 0
CHEST TIGHTNESS: 0
DIARRHEA: 0
NAUSEA: 0
TROUBLE SWALLOWING: 0

## 2018-07-24 NOTE — PROGRESS NOTES
 glucose blood VI test strips (ACCU-CHEK AUNG) strip 1 each by In Vitro route 2 times daily Plus Test Strip Aung 100 each 3    ACCU-CHEK FASTCLIX LANCETS [facility] Patient test 1 time per day. Accu-chek fastclix 102's: 100 each 3    ENSKYCE 0.15-30 MG-MCG per tablet TAKE 1 TABLET BY MOUTH DAILY 84 tablet 1    losartan (COZAAR) 25 MG tablet TAKE 1 TABLET BY MOUTH DAILY 30 tablet 5    aspirin EC 81 MG EC tablet Take 1 tablet by mouth daily 30 tablet 11    dicyclomine (BENTYL) 20 MG tablet Take 1 tablet by mouth 3 times daily as needed (cramping) 30 tablet 0    clotrimazole-betamethasone (LOTRISONE) 1-0.05 % cream Apply topically 2 times daily. 60 g 0    pantoprazole (PROTONIX) 40 MG tablet Take 1 tablet by mouth daily 90 tablet 0    albuterol sulfate  (90 BASE) MCG/ACT inhaler Inhale 2 puffs into the lungs every 4 hours as needed for Wheezing May substitute for insurance preferred (Ventolin, Proventil, ProAir) 1 Inhaler 3    fluticasone (FLONASE) 50 MCG/ACT nasal spray 1 spray by Nasal route daily 1 Bottle 5    loratadine (CLARITIN) 10 MG tablet Take 10 mg by mouth daily. No current facility-administered medications for this visit. Allergies   Allergen Reactions    Lisinopril Other (See Comments)     cough    Sulfa Antibiotics Rash       Review of Systems   Constitutional: Negative for activity change and fatigue. HENT: Negative for trouble swallowing. Respiratory: Negative for cough, chest tightness and shortness of breath. Cardiovascular: Negative for chest pain and palpitations. Gastrointestinal: Negative for diarrhea, nausea and vomiting. Skin: Positive for rash. Neurological: Negative for dizziness and headaches. Psychiatric/Behavioral: Negative for confusion. Vitals:    07/24/18 1550   BP: 110/70   Site: Right Arm   Position: Sitting   Cuff Size: Large Adult   Pulse: 92   Resp: 18   Weight: (!) 303 lb (137.4 kg)       Body mass index is 47.46 kg/m².      Wt

## 2018-08-03 ENCOUNTER — OFFICE VISIT (OUTPATIENT)
Dept: FAMILY MEDICINE CLINIC | Age: 43
End: 2018-08-03

## 2018-08-03 VITALS
BODY MASS INDEX: 45.99 KG/M2 | RESPIRATION RATE: 18 BRPM | HEART RATE: 94 BPM | SYSTOLIC BLOOD PRESSURE: 120 MMHG | HEIGHT: 67 IN | WEIGHT: 293 LBS | TEMPERATURE: 98 F | DIASTOLIC BLOOD PRESSURE: 80 MMHG

## 2018-08-03 DIAGNOSIS — J45.21 MILD INTERMITTENT ASTHMA WITH ACUTE EXACERBATION: Chronic | ICD-10-CM

## 2018-08-03 DIAGNOSIS — J30.9 CHRONIC ALLERGIC RHINITIS, UNSPECIFIED SEASONALITY, UNSPECIFIED TRIGGER: Chronic | ICD-10-CM

## 2018-08-03 DIAGNOSIS — L50.1 URTICARIA, IDIOPATHIC: ICD-10-CM

## 2018-08-03 LAB — HBA1C MFR BLD: 7.2 %

## 2018-08-03 PROCEDURE — 99214 OFFICE O/P EST MOD 30 MIN: CPT | Performed by: FAMILY MEDICINE

## 2018-08-03 PROCEDURE — 83036 HEMOGLOBIN GLYCOSYLATED A1C: CPT | Performed by: FAMILY MEDICINE

## 2018-08-03 RX ORDER — TRIAMCINOLONE ACETONIDE 0.25 MG/G
OINTMENT TOPICAL 2 TIMES DAILY
COMMUNITY
End: 2018-11-09

## 2018-08-03 ASSESSMENT — PATIENT HEALTH QUESTIONNAIRE - PHQ9
1. LITTLE INTEREST OR PLEASURE IN DOING THINGS: 0
SUM OF ALL RESPONSES TO PHQ9 QUESTIONS 1 & 2: 0
2. FEELING DOWN, DEPRESSED OR HOPELESS: 0
SUM OF ALL RESPONSES TO PHQ QUESTIONS 1-9: 0

## 2018-08-03 NOTE — PROGRESS NOTES
Triglycerides   Date Value Ref Range Status   05/02/2018 160 (H) 0 - 150 mg/dL Final     Lab Results   Component Value Date    GLUCOSE 165 (H) 05/02/2018     Lab Results   Component Value Date     05/02/2018    K 4.3 05/02/2018    CREATININE 0.6 05/02/2018     Lab Results   Component Value Date    WBC 10.0 06/10/2016    HGB 12.2 06/10/2016    HCT 38.1 06/10/2016    MCV 78.1 (L) 06/10/2016     06/10/2016     Lab Results   Component Value Date    ALT 9 (L) 05/02/2018    AST 11 (L) 05/02/2018    ALKPHOS 87 05/02/2018    BILITOT 0.3 05/02/2018     TSH (uIU/mL)   Date Value   11/05/2015 3.24     Lab Results   Component Value Date    LABA1C 7.1 05/02/2018    LABA1C 6.8 01/31/2018    LABA1C 6.9 06/19/2017     Lab Results   Component Value Date    LABMICR 1.80 05/02/2018     Subjective:      Chief Complaint   Patient presents with    Diabetes     3 month check up      Shayla Grewal is an 38 y.o. female who presents for follow up of following chronic problems:  1. Uncontrolled type 2 diabetes mellitus without complication, without long-term current use of insulin ([address]    2. Mild intermittent asthma with acute exacerbation    3. Chronic allergic rhinitis, unspecified seasonality, unspecified trigger    4. Urticaria, idiopathic      Complaints: Discuss rash that is recurrent, getting allergy blood work. Seeing derm  Asthma doing well    · Patient checks sugars Test PRN   time(s) daily. Average: N/A. Range: N/A. · Patent follows diabetic diet? Sometimes  · Exercise: walking intermittently  · Taking medicines daily as directed? Yes  · Is the patient reporting any side effects of medications? No  · Patient checks feet daily? Yes  · Is aspirin on med list? Yes  · Tobacco history updated:  reports that she has never smoked. She has never used smokeless tobacco.    ROS:  General ROS: fever? No   night sweats? No  Ophthalmic ROS:blurry vision or decreased vision? No  Endocrine ROS: fatigue?  No   unexpected

## 2018-08-30 DIAGNOSIS — R19.7 INTERMITTENT DIARRHEA: ICD-10-CM

## 2018-08-30 RX ORDER — LANCETS
EACH MISCELLANEOUS
Qty: 102 EACH | Refills: 5 | Status: SHIPPED | OUTPATIENT
Start: 2018-08-30 | End: 2019-06-18

## 2018-08-30 RX ORDER — DICYCLOMINE HCL 20 MG
TABLET ORAL
Qty: 30 TABLET | Refills: 5 | Status: SHIPPED | OUTPATIENT
Start: 2018-08-30

## 2018-09-12 ENCOUNTER — HOSPITAL ENCOUNTER (OUTPATIENT)
Dept: OTHER | Age: 43
Discharge: HOME | End: 2018-09-12
Attending: ALLERGY & IMMUNOLOGY | Admitting: ALLERGY & IMMUNOLOGY

## 2018-09-12 DIAGNOSIS — T78.3XXA ANGIOEDEMA, INITIAL ENCOUNTER: ICD-10-CM

## 2018-09-26 DIAGNOSIS — J45.21 MILD INTERMITTENT ASTHMA WITH ACUTE EXACERBATION: Chronic | ICD-10-CM

## 2018-09-26 RX ORDER — LOSARTAN POTASSIUM 25 MG/1
TABLET ORAL
Qty: 30 TABLET | Refills: 0 | Status: SHIPPED | OUTPATIENT
Start: 2018-09-26 | End: 2018-10-26 | Stop reason: SDUPTHER

## 2018-09-26 RX ORDER — FLUTICASONE PROPIONATE 100 MCG
2 BLISTER, WITH INHALATION DEVICE INHALATION DAILY
Qty: 1 EACH | Refills: 0 | Status: SHIPPED | OUTPATIENT
Start: 2018-09-26 | End: 2018-10-26 | Stop reason: SDUPTHER

## 2018-10-22 RX ORDER — DESOGESTREL AND ETHINYL ESTRADIOL 0.15-0.03
KIT ORAL
Qty: 84 TABLET | Refills: 0 | Status: SHIPPED | OUTPATIENT
Start: 2018-10-22 | End: 2019-01-10 | Stop reason: SDUPTHER

## 2018-10-23 RX ORDER — MONTELUKAST SODIUM 10 MG/1
TABLET ORAL
Qty: 90 TABLET | Refills: 0 | Status: SHIPPED | OUTPATIENT
Start: 2018-10-23 | End: 2019-06-18 | Stop reason: SDUPTHER

## 2018-10-26 DIAGNOSIS — J45.21 MILD INTERMITTENT ASTHMA WITH ACUTE EXACERBATION: Chronic | ICD-10-CM

## 2018-10-26 RX ORDER — LOSARTAN POTASSIUM 25 MG/1
TABLET ORAL
Qty: 30 TABLET | Refills: 0 | Status: SHIPPED | OUTPATIENT
Start: 2018-10-26 | End: 2018-11-25 | Stop reason: SDUPTHER

## 2018-10-26 RX ORDER — FLUTICASONE PROPIONATE 100 MCG
2 BLISTER, WITH INHALATION DEVICE INHALATION DAILY
Qty: 1 EACH | Refills: 2 | Status: SHIPPED | OUTPATIENT
Start: 2018-10-26 | End: 2019-11-04 | Stop reason: SDUPTHER

## 2018-10-30 ENCOUNTER — OFFICE VISIT (OUTPATIENT)
Dept: PULMONOLOGY | Age: 43
End: 2018-10-30
Payer: COMMERCIAL

## 2018-10-30 VITALS
HEIGHT: 67 IN | OXYGEN SATURATION: 98 % | WEIGHT: 293 LBS | HEART RATE: 104 BPM | BODY MASS INDEX: 45.99 KG/M2 | DIASTOLIC BLOOD PRESSURE: 80 MMHG | SYSTOLIC BLOOD PRESSURE: 132 MMHG

## 2018-10-30 DIAGNOSIS — G47.33 OBSTRUCTIVE APNEA: Primary | Chronic | ICD-10-CM

## 2018-10-30 DIAGNOSIS — J45.21 MILD INTERMITTENT ASTHMA WITH ACUTE EXACERBATION: Chronic | ICD-10-CM

## 2018-10-30 DIAGNOSIS — E11.21 TYPE 2 DIABETES MELLITUS WITH DIABETIC NEPHROPATHY, UNSPECIFIED WHETHER LONG TERM INSULIN USE (HCC): ICD-10-CM

## 2018-10-30 DIAGNOSIS — J30.9 ALLERGIC RHINITIS, UNSPECIFIED SEASONALITY, UNSPECIFIED TRIGGER: Chronic | ICD-10-CM

## 2018-10-30 DIAGNOSIS — K21.9 GASTROESOPHAGEAL REFLUX DISEASE WITHOUT ESOPHAGITIS: Chronic | ICD-10-CM

## 2018-10-30 DIAGNOSIS — E66.01 OBESITY, CLASS III, BMI 40-49.9 (MORBID OBESITY) (HCC): ICD-10-CM

## 2018-10-30 PROCEDURE — 99214 OFFICE O/P EST MOD 30 MIN: CPT | Performed by: NURSE PRACTITIONER

## 2018-10-30 ASSESSMENT — SLEEP AND FATIGUE QUESTIONNAIRES
HOW LIKELY ARE YOU TO NOD OFF OR FALL ASLEEP WHEN YOU ARE A PASSENGER IN A CAR FOR AN HOUR WITHOUT A BREAK: 2
HOW LIKELY ARE YOU TO NOD OFF OR FALL ASLEEP WHILE LYING DOWN TO REST IN THE AFTERNOON WHEN CIRCUMSTANCES PERMIT: 2
ESS TOTAL SCORE: 7
HOW LIKELY ARE YOU TO NOD OFF OR FALL ASLEEP WHILE SITTING QUIETLY AFTER LUNCH WITHOUT ALCOHOL: 1
HOW LIKELY ARE YOU TO NOD OFF OR FALL ASLEEP WHILE WATCHING TV: 1
HOW LIKELY ARE YOU TO NOD OFF OR FALL ASLEEP WHILE SITTING AND TALKING TO SOMEONE: 0
HOW LIKELY ARE YOU TO NOD OFF OR FALL ASLEEP WHILE SITTING AND READING: 1
HOW LIKELY ARE YOU TO NOD OFF OR FALL ASLEEP IN A CAR, WHILE STOPPED FOR A FEW MINUTES IN TRAFFIC: 0
HOW LIKELY ARE YOU TO NOD OFF OR FALL ASLEEP WHILE SITTING INACTIVE IN A PUBLIC PLACE: 0

## 2018-10-30 ASSESSMENT — ENCOUNTER SYMPTOMS
SINUS PRESSURE: 0
ABDOMINAL DISTENTION: 0
SHORTNESS OF BREATH: 0
EYE PAIN: 0
ABDOMINAL PAIN: 0
APNEA: 0
EYE REDNESS: 0
RHINORRHEA: 0
COUGH: 0

## 2018-10-30 NOTE — ASSESSMENT & PLAN NOTE
Reviewed compliance download with pt. Supplies and parts as needed for her machine. These are medically necessary. Continue medications per her PCP and other physicians. Limit caffeine use after 3pm.  Encouraged her to work on weight loss through diet and exercise. Diagnoses of Type 2 diabetes mellitus with diabetic nephropathy, unspecified whether long term insulin use ([facility] Obesity, Class III, BMI 40-49.9 (morbid obesity) ([facility] Gastroesophageal reflux disease without esophagitis, Mild intermittent asthma with acute exacerbation, and Allergic rhinitis, unspecified seasonality, unspecified trigger were pertinent to this visit. The chronic medical conditions listed are directly related to the primary diagnosis listed above. The management of the primary diagnosis affects the secondary diagnosis and vice versa.

## 2018-11-09 ENCOUNTER — OFFICE VISIT (OUTPATIENT)
Dept: FAMILY MEDICINE CLINIC | Age: 43
End: 2018-11-09
Payer: COMMERCIAL

## 2018-11-09 VITALS
SYSTOLIC BLOOD PRESSURE: 124 MMHG | WEIGHT: 293 LBS | HEART RATE: 98 BPM | TEMPERATURE: 98.4 F | RESPIRATION RATE: 18 BRPM | DIASTOLIC BLOOD PRESSURE: 82 MMHG | HEIGHT: 67 IN | BODY MASS INDEX: 45.99 KG/M2

## 2018-11-09 DIAGNOSIS — E78.5 HYPERLIPIDEMIA LDL GOAL <100: ICD-10-CM

## 2018-11-09 DIAGNOSIS — J30.9 ALLERGIC RHINITIS, UNSPECIFIED SEASONALITY, UNSPECIFIED TRIGGER: Chronic | ICD-10-CM

## 2018-11-09 DIAGNOSIS — E11.21 TYPE 2 DIABETES MELLITUS WITH DIABETIC NEPHROPATHY, UNSPECIFIED WHETHER LONG TERM INSULIN USE (HCC): Primary | ICD-10-CM

## 2018-11-09 DIAGNOSIS — E66.01 OBESITY, CLASS III, BMI 40-49.9 (MORBID OBESITY) (HCC): ICD-10-CM

## 2018-11-09 LAB — HBA1C MFR BLD: 6.2 %

## 2018-11-09 PROCEDURE — 99214 OFFICE O/P EST MOD 30 MIN: CPT | Performed by: FAMILY MEDICINE

## 2018-11-09 PROCEDURE — 83036 HEMOGLOBIN GLYCOSYLATED A1C: CPT | Performed by: FAMILY MEDICINE

## 2018-11-09 NOTE — PROGRESS NOTES
DIABETES MELLITUS FOLOW-UP  Scribe documentation   I, Goldie Bradford am scribing for Tom Galvez MD. Electronically signed by Goldie Bradford  on 11/9/2018 at 8:02 AM  MD Attestation: Rose Mary Herrera,  personally performed the services described in this documentation, as scribed by the user listed above, and it is both accurate and complete. CHART REVIEW  Health Maintenance   Topic Date Due    Diabetic foot exam  01/31/2019    Lipid screen  05/02/2019    A1C test (Diabetic or Prediabetic)  08/03/2019    Potassium monitoring  09/12/2019    Creatinine monitoring  09/12/2019    Diabetic retinal exam  10/02/2019    DTaP/Tdap/Td vaccine (7 - Td) 12/19/2021    Cervical cancer screen  07/26/2022    Colon cancer screen colonoscopy  04/16/2023    Flu vaccine  Completed    Pneumococcal med risk  Completed    HIV screen  Completed     Social History     Social History    Marital status:      Spouse name: N/A    Number of children: N/A    Years of education: N/A     Occupational History          Social History Main Topics    Smoking status: Never Smoker    Smokeless tobacco: Never Used      Comment: advised not to start    Alcohol use 0.6 oz/week     1 Cans of beer per week    Drug use: No    Sexual activity: Yes     Partners: Male     Birth control/ protection: Pill     Other Topics Concern    None     Social History Narrative    Lives with spouse. Exercise:  Yoga, machines.   Seatbelt use: Always     Cholesterol, Total   Date Value Ref Range Status   05/02/2018 209 (H) 0 - 199 mg/dL Final     LDL Calculated   Date Value Ref Range Status   05/02/2018 101 (H) <100 mg/dL Final     HDL   Date Value Ref Range Status   05/02/2018 76 (H) 40 - 60 mg/dL Final     Triglycerides   Date Value Ref Range Status   05/02/2018 160 (H) 0 - 150 mg/dL Final     Lab Results   Component Value Date    GLUCOSE 147 (H) 09/12/2018     Lab Results   Component Value Date     09/12/2018    K 4.5 09/12/2018

## 2018-11-19 DIAGNOSIS — E11.21 TYPE 2 DIABETES MELLITUS WITH DIABETIC NEPHROPATHY, WITHOUT LONG-TERM CURRENT USE OF INSULIN (HCC): ICD-10-CM

## 2018-11-19 RX ORDER — METFORMIN HYDROCHLORIDE 500 MG/1
TABLET, EXTENDED RELEASE ORAL
Qty: 360 TABLET | Refills: 0 | Status: SHIPPED | OUTPATIENT
Start: 2018-11-19 | End: 2019-06-18 | Stop reason: SDUPTHER

## 2018-11-26 RX ORDER — LOSARTAN POTASSIUM 25 MG/1
TABLET ORAL
Qty: 30 TABLET | Refills: 5 | Status: SHIPPED | OUTPATIENT
Start: 2018-11-26 | End: 2019-05-25 | Stop reason: SDUPTHER

## 2019-01-10 RX ORDER — DESOGESTREL AND ETHINYL ESTRADIOL 0.15-0.03
KIT ORAL
Qty: 84 TABLET | Refills: 0 | Status: SHIPPED | OUTPATIENT
Start: 2019-01-10 | End: 2019-04-02 | Stop reason: SDUPTHER

## 2019-02-08 ENCOUNTER — OFFICE VISIT (OUTPATIENT)
Dept: FAMILY MEDICINE CLINIC | Age: 44
End: 2019-02-08
Payer: COMMERCIAL

## 2019-02-08 VITALS
SYSTOLIC BLOOD PRESSURE: 130 MMHG | TEMPERATURE: 98.1 F | BODY MASS INDEX: 45.99 KG/M2 | WEIGHT: 293 LBS | RESPIRATION RATE: 16 BRPM | DIASTOLIC BLOOD PRESSURE: 85 MMHG | HEART RATE: 108 BPM | HEIGHT: 67 IN

## 2019-02-08 DIAGNOSIS — G47.33 OBSTRUCTIVE APNEA: Chronic | ICD-10-CM

## 2019-02-08 DIAGNOSIS — E66.01 OBESITY, CLASS III, BMI 40-49.9 (MORBID OBESITY) (HCC): ICD-10-CM

## 2019-02-08 DIAGNOSIS — F43.22 ADJUSTMENT DISORDER WITH ANXIOUS MOOD: ICD-10-CM

## 2019-02-08 DIAGNOSIS — J45.21 MILD INTERMITTENT ASTHMA WITH ACUTE EXACERBATION: Chronic | ICD-10-CM

## 2019-02-08 DIAGNOSIS — L21.9 SEBORRHEA: ICD-10-CM

## 2019-02-08 DIAGNOSIS — E11.21 TYPE 2 DIABETES MELLITUS WITH DIABETIC NEPHROPATHY, UNSPECIFIED WHETHER LONG TERM INSULIN USE (HCC): Primary | ICD-10-CM

## 2019-02-08 DIAGNOSIS — E78.5 HYPERLIPIDEMIA LDL GOAL <100: ICD-10-CM

## 2019-02-08 PROBLEM — Z98.890 H/O COLONOSCOPY: Status: RESOLVED | Noted: 2018-04-13 | Resolved: 2019-02-08

## 2019-02-08 PROBLEM — Z86.0100 HISTORY OF COLON POLYPS: Status: ACTIVE | Noted: 2018-05-02

## 2019-02-08 PROBLEM — Z86.010 HISTORY OF COLON POLYPS: Status: ACTIVE | Noted: 2018-05-02

## 2019-02-08 LAB — HBA1C MFR BLD: 6.1 %

## 2019-02-08 PROCEDURE — 99214 OFFICE O/P EST MOD 30 MIN: CPT | Performed by: FAMILY MEDICINE

## 2019-02-08 PROCEDURE — 83036 HEMOGLOBIN GLYCOSYLATED A1C: CPT | Performed by: FAMILY MEDICINE

## 2019-02-08 RX ORDER — ESCITALOPRAM OXALATE 10 MG/1
10 TABLET ORAL DAILY
Qty: 30 TABLET | Refills: 3 | Status: SHIPPED | OUTPATIENT
Start: 2019-02-08 | End: 2019-06-18

## 2019-02-08 RX ORDER — PIMECROLIMUS 10 MG/G
CREAM TOPICAL
COMMUNITY
Start: 2019-02-08

## 2019-02-08 ASSESSMENT — PATIENT HEALTH QUESTIONNAIRE - PHQ9
2. FEELING DOWN, DEPRESSED OR HOPELESS: 1
SUM OF ALL RESPONSES TO PHQ QUESTIONS 1-9: 2
SUM OF ALL RESPONSES TO PHQ9 QUESTIONS 1 & 2: 2
1. LITTLE INTEREST OR PLEASURE IN DOING THINGS: 1
SUM OF ALL RESPONSES TO PHQ QUESTIONS 1-9: 2

## 2019-02-11 ENCOUNTER — PATIENT MESSAGE (OUTPATIENT)
Dept: FAMILY MEDICINE CLINIC | Age: 44
End: 2019-02-11

## 2019-02-11 DIAGNOSIS — E11.21 TYPE 2 DIABETES MELLITUS WITH DIABETIC NEPHROPATHY, UNSPECIFIED WHETHER LONG TERM INSULIN USE (HCC): Primary | ICD-10-CM

## 2019-02-11 RX ORDER — BLOOD-GLUCOSE METER
EACH MISCELLANEOUS
Qty: 1 KIT | Refills: 0 | Status: SHIPPED | OUTPATIENT
Start: 2019-02-11

## 2019-02-11 RX ORDER — BLOOD-GLUCOSE CONTROL, LOW
1 EACH MISCELLANEOUS 2 TIMES DAILY
Qty: 200 EACH | Refills: 3 | Status: SHIPPED | OUTPATIENT
Start: 2019-02-11 | End: 2021-09-17

## 2019-02-11 RX ORDER — BLOOD-GLUCOSE CONTROL, LOW
EACH MISCELLANEOUS
Qty: 1 EACH | Refills: 0 | Status: SHIPPED | OUTPATIENT
Start: 2019-02-11

## 2019-03-07 DIAGNOSIS — E11.21 TYPE 2 DIABETES MELLITUS WITH DIABETIC NEPHROPATHY, UNSPECIFIED WHETHER LONG TERM INSULIN USE (HCC): ICD-10-CM

## 2019-03-07 RX ORDER — DULAGLUTIDE 1.5 MG/.5ML
INJECTION, SOLUTION SUBCUTANEOUS
Qty: 2 ML | Refills: 2 | Status: SHIPPED | OUTPATIENT
Start: 2019-03-07 | End: 2019-05-29 | Stop reason: SDUPTHER

## 2019-04-03 RX ORDER — DESOGESTREL AND ETHINYL ESTRADIOL 0.15-0.03
KIT ORAL
Qty: 84 TABLET | Refills: 0 | Status: SHIPPED | OUTPATIENT
Start: 2019-04-03 | End: 2019-06-18 | Stop reason: SDUPTHER

## 2019-05-04 RX ORDER — PANTOPRAZOLE SODIUM 40 MG/1
40 TABLET, DELAYED RELEASE ORAL DAILY
Qty: 90 TABLET | Refills: 0 | Status: SHIPPED | OUTPATIENT
Start: 2019-05-04 | End: 2019-06-18 | Stop reason: SDUPTHER

## 2019-05-28 RX ORDER — LOSARTAN POTASSIUM 25 MG/1
TABLET ORAL
Qty: 30 TABLET | Refills: 0 | Status: SHIPPED | OUTPATIENT
Start: 2019-05-28 | End: 2019-06-18 | Stop reason: SDUPTHER

## 2019-05-29 DIAGNOSIS — E11.21 TYPE 2 DIABETES MELLITUS WITH DIABETIC NEPHROPATHY, UNSPECIFIED WHETHER LONG TERM INSULIN USE (HCC): ICD-10-CM

## 2019-05-29 RX ORDER — DULAGLUTIDE 1.5 MG/.5ML
INJECTION, SOLUTION SUBCUTANEOUS
Qty: 2 ML | Refills: 0 | Status: SHIPPED | OUTPATIENT
Start: 2019-05-29 | End: 2019-06-18 | Stop reason: SDUPTHER

## 2019-06-18 ENCOUNTER — OFFICE VISIT (OUTPATIENT)
Dept: FAMILY MEDICINE CLINIC | Age: 44
End: 2019-06-18
Payer: COMMERCIAL

## 2019-06-18 VITALS
WEIGHT: 293 LBS | BODY MASS INDEX: 45.99 KG/M2 | TEMPERATURE: 98.2 F | SYSTOLIC BLOOD PRESSURE: 120 MMHG | HEIGHT: 67 IN | DIASTOLIC BLOOD PRESSURE: 82 MMHG | RESPIRATION RATE: 16 BRPM | HEART RATE: 89 BPM

## 2019-06-18 DIAGNOSIS — Z00.00 WELL ADULT HEALTH CHECK: Primary | ICD-10-CM

## 2019-06-18 DIAGNOSIS — L30.9 DERMATITIS: ICD-10-CM

## 2019-06-18 DIAGNOSIS — E11.21 TYPE 2 DIABETES MELLITUS WITH DIABETIC NEPHROPATHY, UNSPECIFIED WHETHER LONG TERM INSULIN USE (HCC): ICD-10-CM

## 2019-06-18 DIAGNOSIS — E78.5 HYPERLIPIDEMIA LDL GOAL <100: ICD-10-CM

## 2019-06-18 DIAGNOSIS — J45.21 MILD INTERMITTENT ASTHMA WITH ACUTE EXACERBATION: Chronic | ICD-10-CM

## 2019-06-18 DIAGNOSIS — K58.0 IRRITABLE BOWEL SYNDROME WITH DIARRHEA: ICD-10-CM

## 2019-06-18 DIAGNOSIS — F43.22 ADJUSTMENT DISORDER WITH ANXIOUS MOOD: ICD-10-CM

## 2019-06-18 DIAGNOSIS — E11.21 TYPE 2 DIABETES MELLITUS WITH DIABETIC NEPHROPATHY, WITHOUT LONG-TERM CURRENT USE OF INSULIN (HCC): ICD-10-CM

## 2019-06-18 DIAGNOSIS — Z30.41 ENCOUNTER FOR SURVEILLANCE OF CONTRACEPTIVE PILLS: ICD-10-CM

## 2019-06-18 DIAGNOSIS — L50.1 URTICARIA, IDIOPATHIC: ICD-10-CM

## 2019-06-18 DIAGNOSIS — K21.9 GASTROESOPHAGEAL REFLUX DISEASE WITHOUT ESOPHAGITIS: Chronic | ICD-10-CM

## 2019-06-18 DIAGNOSIS — E66.01 OBESITY, CLASS III, BMI 40-49.9 (MORBID OBESITY) (HCC): ICD-10-CM

## 2019-06-18 LAB
A/G RATIO: 1.1 (ref 1.1–2.2)
ALBUMIN SERPL-MCNC: 4 G/DL (ref 3.4–5)
ALP BLD-CCNC: 95 U/L (ref 40–129)
ALT SERPL-CCNC: 8 U/L (ref 10–40)
ANION GAP SERPL CALCULATED.3IONS-SCNC: 13 MMOL/L (ref 3–16)
AST SERPL-CCNC: 10 U/L (ref 15–37)
BILIRUB SERPL-MCNC: 0.3 MG/DL (ref 0–1)
BUN BLDV-MCNC: 12 MG/DL (ref 7–20)
CALCIUM SERPL-MCNC: 9.5 MG/DL (ref 8.3–10.6)
CHLORIDE BLD-SCNC: 101 MMOL/L (ref 99–110)
CHOLESTEROL, TOTAL: 181 MG/DL (ref 0–199)
CO2: 22 MMOL/L (ref 21–32)
CREAT SERPL-MCNC: 0.6 MG/DL (ref 0.6–1.1)
ESTIMATED AVERAGE GLUCOSE: 134.1 MG/DL
GFR AFRICAN AMERICAN: >60
GFR NON-AFRICAN AMERICAN: >60
GLOBULIN: 3.5 G/DL
GLUCOSE BLD-MCNC: 103 MG/DL (ref 70–99)
HBA1C MFR BLD: 6.3 %
HDLC SERPL-MCNC: 74 MG/DL (ref 40–60)
LDL CHOLESTEROL CALCULATED: 79 MG/DL
POTASSIUM SERPL-SCNC: 4.5 MMOL/L (ref 3.5–5.1)
SODIUM BLD-SCNC: 136 MMOL/L (ref 136–145)
TOTAL PROTEIN: 7.5 G/DL (ref 6.4–8.2)
TRIGL SERPL-MCNC: 140 MG/DL (ref 0–150)
VLDLC SERPL CALC-MCNC: 28 MG/DL

## 2019-06-18 PROCEDURE — 99396 PREV VISIT EST AGE 40-64: CPT | Performed by: FAMILY MEDICINE

## 2019-06-18 RX ORDER — TRIAMCINOLONE ACETONIDE 5 MG/G
OINTMENT TOPICAL
Qty: 60 G | Refills: 3 | Status: SHIPPED | OUTPATIENT
Start: 2019-06-18 | End: 2021-03-17

## 2019-06-18 RX ORDER — ASPIRIN 81 MG/1
81 TABLET ORAL DAILY
Qty: 30 TABLET | Refills: 11 | COMMUNITY
Start: 2019-06-18 | End: 2022-08-10

## 2019-06-18 RX ORDER — PANTOPRAZOLE SODIUM 40 MG/1
40 TABLET, DELAYED RELEASE ORAL DAILY
Qty: 90 TABLET | Refills: 3 | Status: SHIPPED | OUTPATIENT
Start: 2019-06-18 | End: 2020-05-08

## 2019-06-18 RX ORDER — DESOGESTREL AND ETHINYL ESTRADIOL 0.15-0.03
KIT ORAL
Qty: 84 TABLET | Refills: 3 | Status: SHIPPED | OUTPATIENT
Start: 2019-06-18 | End: 2019-06-27

## 2019-06-18 RX ORDER — METFORMIN HYDROCHLORIDE 500 MG/1
TABLET, EXTENDED RELEASE ORAL
Qty: 360 TABLET | Refills: 0 | Status: SHIPPED | OUTPATIENT
Start: 2019-06-18 | End: 2020-11-16

## 2019-06-18 RX ORDER — MONTELUKAST SODIUM 10 MG/1
TABLET ORAL
Qty: 90 TABLET | Refills: 3 | Status: SHIPPED | OUTPATIENT
Start: 2019-06-18 | End: 2020-04-08

## 2019-06-18 RX ORDER — AMMONIUM LACTATE 12 G/100G
CREAM TOPICAL
Qty: 1 BOTTLE | Refills: 4 | Status: SHIPPED | OUTPATIENT
Start: 2019-06-18 | End: 2020-05-27 | Stop reason: ALTCHOICE

## 2019-06-18 RX ORDER — FLUOXETINE HYDROCHLORIDE 20 MG/1
20 CAPSULE ORAL DAILY
Qty: 90 CAPSULE | Refills: 1 | Status: SHIPPED | OUTPATIENT
Start: 2019-06-18 | End: 2019-07-11

## 2019-06-18 RX ORDER — LOSARTAN POTASSIUM 25 MG/1
TABLET ORAL
Qty: 90 TABLET | Refills: 1 | Status: SHIPPED | OUTPATIENT
Start: 2019-06-18 | End: 2019-12-17 | Stop reason: SDUPTHER

## 2019-06-18 NOTE — PATIENT INSTRUCTIONS
INSTRUCTIONS  · NEXT APPOINTMENT: Please schedule check-up in 3 months. · PLEASE TAKE THIS FORM TO CHECK-OUT WINDOW TO SCHEDULE NEXT VISIT. · PLEASE GET BLOODWORK DRAWN TODAY ON FIRST FLOOR in 170. Take orders with you. RESULTS- most blood tests back in couple days. We will call you if any problems. If bloodwork good, you will get letter in mail or notified thru [address] (if signed up) within 2 weeks. If you do not, please call office. · Please get flu vaccine when available in fall. Can get either at this office or at stores such as [employer] and [place]. · Use lotion on back of arms long term. · Use triamcinolone ointment daily when inflammed. · Start fluoxetine for mood. Let me know if any issues.   Patient Education

## 2019-06-18 NOTE — PROGRESS NOTES
PHYSICAL-VISIT NOTE   Subjective:     Chief Complaint   Patient presents with    Annual Exam       Darlene Rao is a 41 y.o. female who presents for annual testing/preventive review and check-up of medical problems listed below:  1. Well adult health check    2. Type 2 diabetes mellitus with diabetic nephropathy, unspecified whether long term insulin use ([facility] [address]    3. Urticaria, idiopathic    4. Hyperlipidemia LDL goal <100    5. Irritable bowel syndrome with diarrhea    6. Obesity, Class III, BMI 40-49.9 (morbid obesity) ([address]    7. Mild intermittent asthma with acute exacerbation    8. Type 2 diabetes mellitus with diabetic nephropathy, without long-term current use of insulin ([facility] [address]    9. Gastroesophageal reflux disease without esophagitis    10. Encounter for surveillance of contraceptive pills    11. Dermatitis    12. Adjustment disorder with anxious mood        Complaints: pt has a rash on right arm under her arm responds to hydracortizone, it itches off and on and doesn't spread it's just there. It has been there x 1 month but just stays there and doesn't spread. · Wants to talk about replacing the lexapro with something. Hasn't been taking the lexapro for two months she was having side affects, it was making her sleepy and unable to sleep. * Documentation provided by medical assistant reviewed and updated by provider. ROS  See scanned \"Annual Adult Health Checklist\". Pertinent positives addressed above. HISTORY:  Patient's medications, allergies, past medical, and social histories were reviewed and updated as appropriate.      CHART REVIEW  Health Maintenance   Topic Date Due    Lipid screen  05/02/2019    Potassium monitoring  09/12/2019    Creatinine monitoring  09/12/2019    Diabetic retinal exam  10/02/2019    Diabetic foot exam  02/08/2020    A1C test (Diabetic or Prediabetic)  02/08/2020    DTaP/Tdap/Td vaccine (7 - Td) 12/19/2021    Cervical cancer screen  07/26/2022    Colon cancer screen colonoscopy  04/16/2023    Hepatitis B Vaccine  Completed    Flu vaccine  Completed    Pneumococcal 0-64 years Vaccine  Completed    HIV screen  Completed     The 10-year ASCVD risk score (Betsy Wooten, et al., 2013) is: 0.6%    Values used to calculate the score:      Age: 41 years      Sex: Female      Is Non- : No      Diabetic: Yes      Tobacco smoker: No      Systolic Blood Pressure: 997 mmHg      Is BP treated: No      HDL Cholesterol: 76 mg/dL      Total Cholesterol: 209 mg/dL  Prior to Visit Medications    Medication Sig Taking? Authorizing Provider   Biotin 5000 MCG CAPS Take 5,000 mcg by mouth Yes Historical Provider, MD   Dulaglutide (TRULICITY) 1.5 AT/4.8HJ SOPN INJECT 1.5 MG INTO THE SKIN ONCE A WEEK Yes Ajit Wilson MD   losartan (COZAAR) 25 MG tablet TAKE 1 TABLET BY MOUTH DAILY Yes Ajit Wilson MD   pantoprazole (PROTONIX) 40 MG tablet Take 1 tablet by mouth daily Yes Ajit Wilson MD   desogestrel-ethinyl estradiol (ISIBLOOM) 0.15-30 MG-MCG per tablet TAKE 1 TABLET BY MOUTH DAILY( ALTERNATE FOR ENSKYCE) Yes Ajit Wilson MD   metFORMIN (GLUCOPHAGE-XR) 500 MG extended release tablet TAKE 4 TABLETS BY MOUTH DAILY WITH BREAKFAST Yes Ajit Wilson MD   montelukast (SINGULAIR) 10 MG tablet TAKE 1 TABLET BY MOUTH EVERY EVENING( Neftaly Wahl) Yes Ajit Wilson MD   ammonium lactate (LAC-HYDRIN) 12 % cream Apply topically as needed. Yes Ajit Wilson MD   triamcinolone (ARISTOCORT) 0.5 % ointment Apply topically 2 times daily.  Yes Ajit Wilson MD   FLUoxetine (PROZAC) 20 MG capsule Take 1 capsule by mouth daily Yes Ajit Wilson MD   blood glucose test strips (ASCENSIA AUTODISC VI;ONE TOUCH ULTRA TEST VI) strip twice a day Yes Ajit Wilson MD   PRODIGY LANCETS 28G MISC 1 each by Does not apply route 2 times daily Yes Ajit Wilson MD   Lancet Devices (PRODIGY LANCING DEVICE) MISC as needed Yes Ajit Wilson MD   Blood Glucose Monitoring Suppl (PRODIGY POCKET BLOOD GLUCOSE) w/Device KIT as needed Yes Abigail Love MD   pimecrolimus (ELIDEL) 1 % cream Apply topically 2 times daily. Yes Abigail Love MD   FLOVENT DISKUS 100 MCG/BLIST AEPB inhaler INHALE 2 PUFFS INTO THE LUNGS DAILY Yes Abigail Love MD   dicyclomine (BENTYL) 20 MG tablet TAKE 1 TABLET BY MOUTH THREE TIMES DAILY AS NEEDED FOR CRAMPING Yes Abigail Love MD   clotrimazole-betamethasone (LOTRISONE) 1-0.05 % cream Apply topically 2 times daily. Yes Abigail Love MD   albuterol sulfate  (90 BASE) MCG/ACT inhaler Inhale 2 puffs into the lungs every 4 hours as needed for Wheezing May substitute for insurance preferred (Ventolin, Proventil, ProAir) Yes Abigail Love MD   fluticasone (FLONASE) 50 MCG/ACT nasal spray 1 spray by Nasal route daily Yes Abigail Love MD   loratadine (CLARITIN) 10 MG tablet Take 10 mg by mouth daily. Yes Historical Provider, MD      Family History   Problem Relation Age of Onset   Javier Amato Cancer Sister         peritoneal    Diabetes Maternal Grandmother     Other Brother         SARA     Social History     Tobacco Use    Smoking status: Never Smoker    Smokeless tobacco: Never Used    Tobacco comment: advised not to start   Substance Use Topics    Alcohol use:  Yes     Alcohol/week: 0.6 oz     Types: 1 Cans of beer per week    Drug use: No      LAST LABS  Cholesterol, Total   Date Value Ref Range Status   05/02/2018 209 (H) 0 - 199 mg/dL Final     LDL Calculated   Date Value Ref Range Status   05/02/2018 101 (H) <100 mg/dL Final     HDL   Date Value Ref Range Status   05/02/2018 76 (H) 40 - 60 mg/dL Final     Triglycerides   Date Value Ref Range Status   05/02/2018 160 (H) 0 - 150 mg/dL Final     Lab Results   Component Value Date    GLUCOSE 147 (H) 09/12/2018     Lab Results   Component Value Date     09/12/2018    K 4.5 09/12/2018    CREATININE 0.7 09/12/2018     Lab Results   Component Value Date    WBC 9.4 09/12/2018    HGB 12.4 09/12/2018    HCT 37.6 09/12/2018    MCV 81.3 09/12/2018     09/12/2018     Lab Results   Component Value Date    ALT 12 09/12/2018    AST 12 (L) 09/12/2018    ALKPHOS 93 09/12/2018    BILITOT <0.2 09/12/2018     TSH (uIU/mL)   Date Value   09/12/2018 2.16     Lab Results   Component Value Date    LABA1C 6.1 02/08/2019     Objective:   PHYSICAL EXAM   /82 (Site: Left Upper Arm, Position: Sitting, Cuff Size: Large Adult)   Pulse 89   Temp 98.2 °F (36.8 °C) (Oral)   Resp 16   Ht 5' 7\" (1.702 m)   Wt 300 lb (136.1 kg)   BMI 46.99 kg/m²   BP Readings from Last 5 Encounters:   06/18/19 120/82   02/08/19 130/85   11/09/18 124/82   10/30/18 132/80   09/04/18 (!) 142/90     Wt Readings from Last 5 Encounters:   06/18/19 300 lb (136.1 kg)   02/08/19 295 lb (133.8 kg)   11/09/18 300 lb (136.1 kg)   10/30/18 299 lb (135.6 kg)   09/04/18 297 lb 6.4 oz (134.9 kg)      GENERAL:   · well-developed, well-nourished, alert, no distress. EYES:   · External findings: lids and lashes normal and conjunctivae and sclerae normal  · Eyes: no periorbital cellulitis. ENT:   · External nose and ears appear normal  · normal TM's and external ear canals both ears  · Pharynx: normal. Exudates: None  · Lips, mucosa, and tongue normal  · Hearing grossly normal.     NECK:   · Supple, symmetrical, trachea midline  · Thyroid not enlarged, symmetric, no tenderness/mass/nodules  LYMPH:  · no cervical nodes, no supraclavicular nodes  LUNGS:    · Breathing unlabored  · clear to auscultation bilaterally and good air movement  CARDIOVASC:   · regular rate and rhythm, S1, S2 normal. No murmur, click, rub or gallop  · Apical impulse normal  · LEGS:  Lower extremity edema: none    · No carotid bruits  ABDOMEN:   · Soft, non-tender, no masses  · No hepatosplenomegaly  · No hernias noted.   Exam limited by body habitus  SKIN: warm and dry  · No rashes or suspicious lesions  · No nodules or induration  PSYCH:    · Alert and oriented  · Normal reasoning, insight good  · Facial expressions full, mood appropriate  · No memory disturbance noted  MUSCULOSKEL:    · Gait normal, assistive device: none  · No significant finger or nail findings  · Spine symmetric, no deformities, no kyphosis      Assessment and Plan:      Diagnosis Orders   1. Well adult health check     2. Type 2 diabetes mellitus with diabetic nephropathy, unspecified whether long term insulin use (HCC)  Dulaglutide (TRULICITY) 1.5 HK/7.6JK SOPN    losartan (COZAAR) 25 MG tablet    Comprehensive Metabolic Panel    Hemoglobin A1C   3. Urticaria, idiopathic     4. Hyperlipidemia LDL goal <100  LIPID PANEL    Comprehensive Metabolic Panel   5. Irritable bowel syndrome with diarrhea     6. Obesity, Class III, BMI 40-49.9 (morbid obesity) ([address]     7. Mild intermittent asthma with acute exacerbation  montelukast (SINGULAIR) 10 MG tablet   8. Type 2 diabetes mellitus with diabetic nephropathy, without long-term current use of insulin (HCC)  metFORMIN (GLUCOPHAGE-XR) 500 MG extended release tablet   9. Gastroesophageal reflux disease without esophagitis  metFORMIN (GLUCOPHAGE-XR) 500 MG extended release tablet   10. Encounter for surveillance of contraceptive pills  desogestrel-ethinyl estradiol (ISIBLOOM) 0.15-30 MG-MCG per tablet   11. Dermatitis  ammonium lactate (LAC-HYDRIN) 12 % cream    triamcinolone (ARISTOCORT) 0.5 % ointment   12. Adjustment disorder with anxious mood  FLUoxetine (PROZAC) 20 MG capsule   Stable. Plan as above and below. INSTRUCTIONS  · NEXT APPOINTMENT: Please schedule check-up in 3 months. · PLEASE TAKE THIS FORM TO CHECK-OUT WINDOW TO SCHEDULE NEXT VISIT. · PLEASE GET BLOODWORK DRAWN TODAY ON FIRST FLOOR in 170. Take orders with you. RESULTS- most blood tests back in couple days. We will call you if any problems. If bloodwork good, you will get letter in mail or notified thru [address] (if signed up) within 2 weeks. If you do not, please call office.    · Please get flu vaccine when available in fall. Can get either at this office or at stores such as [employer] and [employer]. · Use lotion on back of arms long term. · Use triamcinolone ointment daily when inflammed. · Start fluoxetine for mood. Let me know if any issues.

## 2019-06-27 DIAGNOSIS — E11.21 TYPE 2 DIABETES MELLITUS WITH DIABETIC NEPHROPATHY, UNSPECIFIED WHETHER LONG TERM INSULIN USE (HCC): ICD-10-CM

## 2019-06-27 RX ORDER — DESOGESTREL AND ETHINYL ESTRADIOL 0.15-0.03
KIT ORAL
Qty: 84 TABLET | Refills: 0 | Status: SHIPPED | OUTPATIENT
Start: 2019-06-27 | End: 2020-04-13

## 2019-06-27 RX ORDER — DULAGLUTIDE 1.5 MG/.5ML
INJECTION, SOLUTION SUBCUTANEOUS
Qty: 2 ML | Refills: 1 | Status: SHIPPED | OUTPATIENT
Start: 2019-06-27 | End: 2019-10-11

## 2019-07-08 ENCOUNTER — PATIENT MESSAGE (OUTPATIENT)
Dept: FAMILY MEDICINE CLINIC | Age: 44
End: 2019-07-08

## 2019-07-09 NOTE — TELEPHONE ENCOUNTER
From: Mark Conley  To: Joie Willett MD  Sent: 7/8/2019 7:42 PM EDT  Subject: Prescription Question    I've been on the Prozac for two weeks now. At first it seemed to make my anxiety worse, but that has eased a bit. I think it's starting to help. Sary Snyder says I'm restless in my sleep, but I feel like I'm getting good rest. I'm also getting mild twitching in my hands a few times a day. Is that something that will go away when I stop taking it? I've heard the twitching can become permanent, and if so it's not worth it.

## 2019-07-11 RX ORDER — BUPROPION HYDROCHLORIDE 150 MG/1
150 TABLET ORAL EVERY MORNING
Qty: 30 TABLET | Refills: 2 | Status: SHIPPED | OUTPATIENT
Start: 2019-07-11 | End: 2020-01-08

## 2019-09-19 ENCOUNTER — OFFICE VISIT (OUTPATIENT)
Dept: FAMILY MEDICINE CLINIC | Age: 44
End: 2019-09-19
Payer: COMMERCIAL

## 2019-09-19 VITALS
SYSTOLIC BLOOD PRESSURE: 120 MMHG | BODY MASS INDEX: 45.99 KG/M2 | RESPIRATION RATE: 16 BRPM | HEIGHT: 67 IN | HEART RATE: 66 BPM | WEIGHT: 293 LBS | DIASTOLIC BLOOD PRESSURE: 84 MMHG

## 2019-09-19 DIAGNOSIS — E11.21 TYPE 2 DIABETES MELLITUS WITH DIABETIC NEPHROPATHY, UNSPECIFIED WHETHER LONG TERM INSULIN USE (HCC): Primary | ICD-10-CM

## 2019-09-19 DIAGNOSIS — E78.5 HYPERLIPIDEMIA LDL GOAL <100: ICD-10-CM

## 2019-09-19 DIAGNOSIS — J45.20 MILD INTERMITTENT ASTHMA WITHOUT COMPLICATION: Chronic | ICD-10-CM

## 2019-09-19 DIAGNOSIS — K21.9 GASTROESOPHAGEAL REFLUX DISEASE WITHOUT ESOPHAGITIS: ICD-10-CM

## 2019-09-19 LAB — HBA1C MFR BLD: 6 %

## 2019-09-19 PROCEDURE — 83036 HEMOGLOBIN GLYCOSYLATED A1C: CPT | Performed by: FAMILY MEDICINE

## 2019-09-19 PROCEDURE — 99214 OFFICE O/P EST MOD 30 MIN: CPT | Performed by: FAMILY MEDICINE

## 2019-10-11 DIAGNOSIS — E11.21 TYPE 2 DIABETES MELLITUS WITH DIABETIC NEPHROPATHY, UNSPECIFIED WHETHER LONG TERM INSULIN USE (HCC): ICD-10-CM

## 2019-11-04 DIAGNOSIS — J45.21 MILD INTERMITTENT ASTHMA WITH ACUTE EXACERBATION: Chronic | ICD-10-CM

## 2019-11-04 RX ORDER — FLUTICASONE PROPIONATE 100 MCG
2 BLISTER, WITH INHALATION DEVICE INHALATION DAILY
Qty: 1 EACH | Refills: 0 | Status: SHIPPED | OUTPATIENT
Start: 2019-11-04 | End: 2019-11-23 | Stop reason: SDUPTHER

## 2019-11-07 ENCOUNTER — OFFICE VISIT (OUTPATIENT)
Dept: PULMONOLOGY | Age: 44
End: 2019-11-07
Payer: COMMERCIAL

## 2019-11-07 VITALS
SYSTOLIC BLOOD PRESSURE: 132 MMHG | HEIGHT: 67 IN | DIASTOLIC BLOOD PRESSURE: 80 MMHG | BODY MASS INDEX: 45.99 KG/M2 | OXYGEN SATURATION: 98 % | WEIGHT: 293 LBS | HEART RATE: 112 BPM

## 2019-11-07 DIAGNOSIS — E66.01 OBESITY, CLASS III, BMI 40-49.9 (MORBID OBESITY) (HCC): Chronic | ICD-10-CM

## 2019-11-07 DIAGNOSIS — G47.33 OBSTRUCTIVE APNEA: Chronic | ICD-10-CM

## 2019-11-07 DIAGNOSIS — E11.21 TYPE 2 DIABETES MELLITUS WITH DIABETIC NEPHROPATHY, UNSPECIFIED WHETHER LONG TERM INSULIN USE (HCC): Chronic | ICD-10-CM

## 2019-11-07 DIAGNOSIS — K21.9 GASTROESOPHAGEAL REFLUX DISEASE WITHOUT ESOPHAGITIS: Chronic | ICD-10-CM

## 2019-11-07 PROCEDURE — 99214 OFFICE O/P EST MOD 30 MIN: CPT | Performed by: INTERNAL MEDICINE

## 2019-11-07 ASSESSMENT — SLEEP AND FATIGUE QUESTIONNAIRES
ESS TOTAL SCORE: 8
HOW LIKELY ARE YOU TO NOD OFF OR FALL ASLEEP WHILE WATCHING TV: 1
HOW LIKELY ARE YOU TO NOD OFF OR FALL ASLEEP WHILE SITTING AND TALKING TO SOMEONE: 0
HOW LIKELY ARE YOU TO NOD OFF OR FALL ASLEEP WHEN YOU ARE A PASSENGER IN A CAR FOR AN HOUR WITHOUT A BREAK: 2
HOW LIKELY ARE YOU TO NOD OFF OR FALL ASLEEP WHILE SITTING QUIETLY AFTER LUNCH WITHOUT ALCOHOL: 1
HOW LIKELY ARE YOU TO NOD OFF OR FALL ASLEEP IN A CAR, WHILE STOPPED FOR A FEW MINUTES IN TRAFFIC: 0
HOW LIKELY ARE YOU TO NOD OFF OR FALL ASLEEP WHILE LYING DOWN TO REST IN THE AFTERNOON WHEN CIRCUMSTANCES PERMIT: 3
HOW LIKELY ARE YOU TO NOD OFF OR FALL ASLEEP WHILE SITTING INACTIVE IN A PUBLIC PLACE: 0
HOW LIKELY ARE YOU TO NOD OFF OR FALL ASLEEP WHILE SITTING AND READING: 1

## 2019-11-07 ASSESSMENT — ENCOUNTER SYMPTOMS
APNEA: 0
COUGH: 0
CHOKING: 0
RHINORRHEA: 0
SHORTNESS OF BREATH: 0

## 2019-11-21 DIAGNOSIS — J45.21 MILD INTERMITTENT ASTHMA WITH ACUTE EXACERBATION: Chronic | ICD-10-CM

## 2019-11-21 RX ORDER — FLUTICASONE PROPIONATE 100 MCG
2 BLISTER, WITH INHALATION DEVICE INHALATION DAILY
Refills: 0 | OUTPATIENT
Start: 2019-11-21

## 2019-11-22 DIAGNOSIS — E11.21 TYPE 2 DIABETES MELLITUS WITH DIABETIC NEPHROPATHY, UNSPECIFIED WHETHER LONG TERM INSULIN USE (HCC): ICD-10-CM

## 2020-01-08 ENCOUNTER — OFFICE VISIT (OUTPATIENT)
Dept: FAMILY MEDICINE CLINIC | Age: 45
End: 2020-01-08
Payer: COMMERCIAL

## 2020-01-08 VITALS
DIASTOLIC BLOOD PRESSURE: 78 MMHG | HEART RATE: 92 BPM | RESPIRATION RATE: 16 BRPM | HEIGHT: 67 IN | SYSTOLIC BLOOD PRESSURE: 122 MMHG | WEIGHT: 292 LBS | OXYGEN SATURATION: 99 % | BODY MASS INDEX: 45.83 KG/M2

## 2020-01-08 LAB — HBA1C MFR BLD: 6 %

## 2020-01-08 PROCEDURE — 83036 HEMOGLOBIN GLYCOSYLATED A1C: CPT | Performed by: FAMILY MEDICINE

## 2020-01-08 PROCEDURE — 99214 OFFICE O/P EST MOD 30 MIN: CPT | Performed by: FAMILY MEDICINE

## 2020-01-08 ASSESSMENT — PATIENT HEALTH QUESTIONNAIRE - PHQ9
1. LITTLE INTEREST OR PLEASURE IN DOING THINGS: 0
SUM OF ALL RESPONSES TO PHQ QUESTIONS 1-9: 0
2. FEELING DOWN, DEPRESSED OR HOPELESS: 0
SUM OF ALL RESPONSES TO PHQ QUESTIONS 1-9: 0
SUM OF ALL RESPONSES TO PHQ9 QUESTIONS 1 & 2: 0

## 2020-01-08 NOTE — PROGRESS NOTES
No  Changes in skin spots? No    There are no preventive care reminders to display for this patient. LAST LABS  LDL Calculated   Date Value Ref Range Status   06/18/2019 79 <100 mg/dL Final     Lab Results   Component Value Date    HDL 74 (H) 06/18/2019     Lab Results   Component Value Date    TRIG 140 06/18/2019     Lab Results   Component Value Date    GLUCOSE 103 (H) 06/18/2019     Lab Results   Component Value Date     06/18/2019    K 4.5 06/18/2019    CREATININE 0.6 06/18/2019     Lab Results   Component Value Date    WBC 9.4 09/12/2018    HGB 12.4 09/12/2018     09/12/2018     Lab Results   Component Value Date    ALT 8 (L) 06/18/2019    AST 10 (L) 06/18/2019    ALKPHOS 95 06/18/2019    BILITOT 0.3 06/18/2019     TSH (uIU/mL)   Date Value   09/12/2018 2.16     Lab Results   Component Value Date    LABA1C 6.0 09/19/2019    LABA1C 6.3 06/18/2019    LABA1C 6.1 02/08/2019     *Chief complaint, HPI, History and ROS provided by the medical assistant has been reviewed and verified by provider- Deanna Wilkinson MD    HISTORY:  Patient's medications, allergies, past medical, and social histories were reviewed and updated as appropriate. CHART REVIEW  There are no preventive care reminders to display for this patient. Social History     Tobacco Use    Smoking status: Never Smoker    Smokeless tobacco: Never Used    Tobacco comment: advised not to start   Substance Use Topics    Alcohol use: Yes     Alcohol/week: 1.0 standard drinks     Types: 1 Cans of beer per week    Drug use: No      The 10-year ASCVD risk score (Enriqueta Laurent, et al., 2013) is: 0.6%    Values used to calculate the score:      Age: 45 years      Sex: Female      Is Non- : No      Diabetic: Yes      Tobacco smoker: No      Systolic Blood Pressure: 603 mmHg      Is BP treated: No      HDL Cholesterol: 74 mg/dL      Total Cholesterol: 181 mg/dL  Prior to Visit Medications    Medication Sig Taking? Authorizing Provider   Dulaglutide (TRULICITY) 1.5 XE/0.6GR SOPN INJECT 1.5MG UNDER THE SKIN ONCE A WEEK Yes Mikal Silva MD   losartan (COZAAR) 25 MG tablet TAKE 1 TABLET BY MOUTH DAILY Yes Mikal Silva MD   fluticasone propionate (FLOVENT DISKUS) 100 MCG/BLIST AEPB inhaler Inhale 2 puffs into the lungs daily Yes Mikal Silva MD   ISIBLOOM 0.15-30 MG-MCG per tablet TAKE 1 TABLET BY MOUTH DAILY( ALTERNATE FOR ENSKYCE) Yes Mikal Silva MD   Biotin 5000 MCG CAPS Take 5,000 mcg by mouth Yes Historical Provider, MD   pantoprazole (PROTONIX) 40 MG tablet Take 1 tablet by mouth daily Yes Mikal Silva MD   metFORMIN (GLUCOPHAGE-XR) 500 MG extended release tablet TAKE 4 TABLETS BY MOUTH DAILY WITH BREAKFAST Yes Mikal Silva MD   montelukast (SINGULAIR) 10 MG tablet TAKE 1 TABLET BY MOUTH EVERY EVENING( Abigail Ramírez) Yes Mikal Silva MD   ammonium lactate (LAC-HYDRIN) 12 % cream Apply topically as needed. Yes Mikal Silva MD   triamcinolone (ARISTOCORT) 0.5 % ointment Apply topically 2 times daily. Yes Mikal Silva MD   aspirin EC 81 MG EC tablet Take 1 tablet by mouth daily Yes Mikal Silva MD   blood glucose test strips (ASCENSIA AUTODISC VI;ONE TOUCH ULTRA TEST VI) strip twice a day Yes MD SYBIL Sanchez LANCETS 28G MISC 1 each by Does not apply route 2 times daily Yes Mikal Silva MD   Lancet Devices (PRODIGY LANCING DEVICE) MISC as needed Yes Mikal Silva MD   Blood Glucose Monitoring Suppl (PRODIGY POCKET BLOOD GLUCOSE) w/Device KIT as needed Yes Mikal Silva MD   pimecrolimus (ELIDEL) 1 % cream Apply topically 2 times daily. Yes Mikal Silva MD   dicyclomine (BENTYL) 20 MG tablet TAKE 1 TABLET BY MOUTH THREE TIMES DAILY AS NEEDED FOR CRAMPING Yes Mikal Silva MD   clotrimazole-betamethasone (LOTRISONE) 1-0.05 % cream Apply topically 2 times daily.  Yes Mikal Silva MD   albuterol sulfate  (90 BASE) MCG/ACT inhaler Inhale 2 puffs into the lungs every 4 hours as needed POCT glycosylated hemoglobin (Hb A1C)    HM DIABETES FOOT EXAM   2. Mild intermittent asthma without complication     3. Allergic rhinitis, unspecified seasonality, unspecified trigger     4. Cholinergic urticaria     5. Dysfunctional uterine bleeding     6. Hyperlipidemia LDL goal <100     7. Obesity, Class III, BMI 40-49.9 (morbid obesity) ([facility] [address]     8. Gastroesophageal reflux disease without esophagitis  MARLON - Mustapha Nguyen MD, Gastroenterology, [facility]. Continue current Tx plan. Any changes marked below. INSTRUCTIONS  NEXT APPOINTMENT: Please schedule check-up in 3 months. · PLEASE TAKE THIS FORM TO CHECK-OUT WINDOW TO SCHEDULE NEXT VISIT. · INCREASE pantoprozole to twice a day with meals. · See GI soon.

## 2020-01-08 NOTE — PATIENT INSTRUCTIONS
INSTRUCTIONS  NEXT APPOINTMENT: Please schedule check-up in 3 months. · PLEASE TAKE THIS FORM TO CHECK-OUT WINDOW TO SCHEDULE NEXT VISIT. · INCREASE pantoprozole to twice a day with meals. · See GI soon.   Patient Education

## 2020-01-16 LAB
ANION GAP SERPL CALCULATED.3IONS-SCNC: 18 MMOL/L (ref 3–16)
BUN BLDV-MCNC: 11 MG/DL (ref 7–20)
CALCIUM SERPL-MCNC: 9.7 MG/DL (ref 8.3–10.6)
CHLORIDE BLD-SCNC: 97 MMOL/L (ref 99–110)
CO2: 19 MMOL/L (ref 21–32)
CREAT SERPL-MCNC: 0.6 MG/DL (ref 0.6–1.1)
GFR AFRICAN AMERICAN: >60
GFR NON-AFRICAN AMERICAN: >60
GLUCOSE BLD-MCNC: 103 MG/DL (ref 70–99)
MAGNESIUM: 2.2 MG/DL (ref 1.8–2.4)
POTASSIUM SERPL-SCNC: 4.2 MMOL/L (ref 3.5–5.1)
SODIUM BLD-SCNC: 134 MMOL/L (ref 136–145)
VITAMIN B-12: 427 PG/ML (ref 211–911)
VITAMIN D 25-HYDROXY: 23 NG/ML

## 2020-02-07 ENCOUNTER — PATIENT MESSAGE (OUTPATIENT)
Dept: FAMILY MEDICINE CLINIC | Age: 45
End: 2020-02-07

## 2020-02-22 ENCOUNTER — HOSPITAL ENCOUNTER (OUTPATIENT)
Age: 45
Setting detail: OBSERVATION
Discharge: HOME | End: 2020-02-23
Attending: EMERGENCY MEDICINE | Admitting: INTERNAL MEDICINE
Payer: COMMERCIAL

## 2020-02-22 ENCOUNTER — APPOINTMENT (OUTPATIENT)
Dept: CT IMAGING | Age: 45
End: 2020-02-22
Payer: COMMERCIAL

## 2020-02-22 ENCOUNTER — APPOINTMENT (OUTPATIENT)
Dept: GENERAL RADIOLOGY | Age: 45
End: 2020-02-22
Payer: COMMERCIAL

## 2020-02-22 PROBLEM — R07.9 CHEST PAIN: Status: ACTIVE | Noted: 2020-02-22

## 2020-02-22 LAB
AMPHETAMINE SCREEN, URINE: NORMAL
ANION GAP SERPL CALCULATED.3IONS-SCNC: 16 MMOL/L (ref 3–16)
BARBITURATE SCREEN URINE: NORMAL
BASOPHILS ABSOLUTE: 0.1 K/UL (ref 0–0.2)
BASOPHILS RELATIVE PERCENT: 0.5 %
BENZODIAZEPINE SCREEN, URINE: NORMAL
BUN BLDV-MCNC: 9 MG/DL (ref 7–20)
CALCIUM SERPL-MCNC: 9.4 MG/DL (ref 8.3–10.6)
CANNABINOID SCREEN URINE: NORMAL
CHLORIDE BLD-SCNC: 101 MMOL/L (ref 99–110)
CO2: 20 MMOL/L (ref 21–32)
COCAINE METABOLITE SCREEN URINE: NORMAL
CREAT SERPL-MCNC: 0.6 MG/DL (ref 0.6–1.1)
EOSINOPHILS ABSOLUTE: 0 K/UL (ref 0–0.6)
EOSINOPHILS RELATIVE PERCENT: 0.1 %
GFR AFRICAN AMERICAN: >60
GFR NON-AFRICAN AMERICAN: >60
GLUCOSE BLD-MCNC: 116 MG/DL (ref 70–99)
HCG QUALITATIVE: NEGATIVE
HCT VFR BLD CALC: 38.1 % (ref 36–48)
HEMOGLOBIN: 12.5 G/DL (ref 12–16)
LYMPHOCYTES ABSOLUTE: 2.1 K/UL (ref 1–5.1)
LYMPHOCYTES RELATIVE PERCENT: 16.9 %
Lab: NORMAL
MCH RBC QN AUTO: 27 PG (ref 26–34)
MCHC RBC AUTO-ENTMCNC: 32.9 G/DL (ref 31–36)
MCV RBC AUTO: 82 FL (ref 80–100)
METHADONE SCREEN, URINE: NORMAL
MONOCYTES ABSOLUTE: 0.5 K/UL (ref 0–1.3)
MONOCYTES RELATIVE PERCENT: 3.6 %
NEUTROPHILS ABSOLUTE: 10 K/UL (ref 1.7–7.7)
NEUTROPHILS RELATIVE PERCENT: 78.9 %
OPIATE SCREEN URINE: NORMAL
OXYCODONE URINE: NORMAL
PDW BLD-RTO: 16.1 % (ref 12.4–15.4)
PH UA: 5
PHENCYCLIDINE SCREEN URINE: NORMAL
PLATELET # BLD: 296 K/UL (ref 135–450)
PMV BLD AUTO: 8.6 FL (ref 5–10.5)
POTASSIUM REFLEX MAGNESIUM: 3.9 MMOL/L (ref 3.5–5.1)
PROPOXYPHENE SCREEN: NORMAL
RBC # BLD: 4.64 M/UL (ref 4–5.2)
SODIUM BLD-SCNC: 137 MMOL/L (ref 136–145)
TROPONIN: <0.01 NG/ML
TROPONIN: <0.01 NG/ML
WBC # BLD: 12.7 K/UL (ref 4–11)

## 2020-02-22 PROCEDURE — 84703 CHORIONIC GONADOTROPIN ASSAY: CPT

## 2020-02-22 PROCEDURE — 71045 X-RAY EXAM CHEST 1 VIEW: CPT

## 2020-02-22 PROCEDURE — 99285 EMERGENCY DEPT VISIT HI MDM: CPT

## 2020-02-22 PROCEDURE — 6370000000 HC RX 637 (ALT 250 FOR IP): Performed by: NURSE PRACTITIONER

## 2020-02-22 PROCEDURE — 6360000004 HC RX CONTRAST MEDICATION: Performed by: EMERGENCY MEDICINE

## 2020-02-22 PROCEDURE — 84484 ASSAY OF TROPONIN QUANT: CPT

## 2020-02-22 PROCEDURE — G0378 HOSPITAL OBSERVATION PER HR: HCPCS

## 2020-02-22 PROCEDURE — 93005 ELECTROCARDIOGRAM TRACING: CPT | Performed by: EMERGENCY MEDICINE

## 2020-02-22 PROCEDURE — 71260 CT THORAX DX C+: CPT

## 2020-02-22 PROCEDURE — 2580000003 HC RX 258: Performed by: NURSE PRACTITIONER

## 2020-02-22 PROCEDURE — 80048 BASIC METABOLIC PNL TOTAL CA: CPT

## 2020-02-22 PROCEDURE — 36415 COLL VENOUS BLD VENIPUNCTURE: CPT

## 2020-02-22 PROCEDURE — 85025 COMPLETE CBC W/AUTO DIFF WBC: CPT

## 2020-02-22 PROCEDURE — 80307 DRUG TEST PRSMV CHEM ANLYZR: CPT

## 2020-02-22 PROCEDURE — 2580000003 HC RX 258: Performed by: PHYSICIAN ASSISTANT

## 2020-02-22 RX ORDER — LOSARTAN POTASSIUM 25 MG/1
25 TABLET ORAL DAILY
Status: DISCONTINUED | OUTPATIENT
Start: 2020-02-23 | End: 2020-02-23 | Stop reason: HOSPADM

## 2020-02-22 RX ORDER — ASPIRIN 81 MG/1
81 TABLET, CHEWABLE ORAL DAILY
Status: DISCONTINUED | OUTPATIENT
Start: 2020-02-23 | End: 2020-02-23 | Stop reason: HOSPADM

## 2020-02-22 RX ORDER — MONTELUKAST SODIUM 10 MG/1
10 TABLET ORAL NIGHTLY
Status: DISCONTINUED | OUTPATIENT
Start: 2020-02-22 | End: 2020-02-23 | Stop reason: HOSPADM

## 2020-02-22 RX ORDER — CETIRIZINE HYDROCHLORIDE 10 MG/1
10 TABLET ORAL DAILY
Status: DISCONTINUED | OUTPATIENT
Start: 2020-02-23 | End: 2020-02-23 | Stop reason: HOSPADM

## 2020-02-22 RX ORDER — POLYETHYLENE GLYCOL 3350 17 G/17G
17 POWDER, FOR SOLUTION ORAL DAILY PRN
Status: DISCONTINUED | OUTPATIENT
Start: 2020-02-22 | End: 2020-02-23 | Stop reason: HOSPADM

## 2020-02-22 RX ORDER — ACETAMINOPHEN 325 MG/1
650 TABLET ORAL EVERY 6 HOURS PRN
Status: DISCONTINUED | OUTPATIENT
Start: 2020-02-22 | End: 2020-02-23 | Stop reason: HOSPADM

## 2020-02-22 RX ORDER — FLUTICASONE PROPIONATE 50 MCG
1 SPRAY, SUSPENSION (ML) NASAL DAILY
Status: DISCONTINUED | OUTPATIENT
Start: 2020-02-23 | End: 2020-02-23 | Stop reason: HOSPADM

## 2020-02-22 RX ORDER — PANTOPRAZOLE SODIUM 40 MG/1
40 TABLET, DELAYED RELEASE ORAL DAILY
Status: DISCONTINUED | OUTPATIENT
Start: 2020-02-23 | End: 2020-02-23 | Stop reason: HOSPADM

## 2020-02-22 RX ORDER — SODIUM CHLORIDE 0.9 % (FLUSH) 0.9 %
10 SYRINGE (ML) INJECTION EVERY 12 HOURS SCHEDULED
Status: DISCONTINUED | OUTPATIENT
Start: 2020-02-22 | End: 2020-02-23 | Stop reason: HOSPADM

## 2020-02-22 RX ORDER — ONDANSETRON 2 MG/ML
4 INJECTION INTRAMUSCULAR; INTRAVENOUS EVERY 6 HOURS PRN
Status: DISCONTINUED | OUTPATIENT
Start: 2020-02-22 | End: 2020-02-23 | Stop reason: HOSPADM

## 2020-02-22 RX ORDER — ACETAMINOPHEN 650 MG/1
650 SUPPOSITORY RECTAL EVERY 6 HOURS PRN
Status: DISCONTINUED | OUTPATIENT
Start: 2020-02-22 | End: 2020-02-23 | Stop reason: HOSPADM

## 2020-02-22 RX ORDER — ATORVASTATIN CALCIUM 40 MG/1
40 TABLET, FILM COATED ORAL NIGHTLY
Status: DISCONTINUED | OUTPATIENT
Start: 2020-02-22 | End: 2020-02-23 | Stop reason: HOSPADM

## 2020-02-22 RX ORDER — SODIUM CHLORIDE 0.9 % (FLUSH) 0.9 %
10 SYRINGE (ML) INJECTION PRN
Status: DISCONTINUED | OUTPATIENT
Start: 2020-02-22 | End: 2020-02-23 | Stop reason: HOSPADM

## 2020-02-22 RX ORDER — 0.9 % SODIUM CHLORIDE 0.9 %
1000 INTRAVENOUS SOLUTION INTRAVENOUS ONCE
Status: COMPLETED | OUTPATIENT
Start: 2020-02-22 | End: 2020-02-22

## 2020-02-22 RX ORDER — PROMETHAZINE HYDROCHLORIDE 25 MG/1
12.5 TABLET ORAL EVERY 6 HOURS PRN
Status: DISCONTINUED | OUTPATIENT
Start: 2020-02-22 | End: 2020-02-23 | Stop reason: HOSPADM

## 2020-02-22 RX ADMIN — IOPAMIDOL 75 ML: 755 INJECTION, SOLUTION INTRAVENOUS at 16:01

## 2020-02-22 RX ADMIN — ATORVASTATIN CALCIUM 40 MG: 40 TABLET, FILM COATED ORAL at 21:19

## 2020-02-22 RX ADMIN — SODIUM CHLORIDE 1000 ML: 9 INJECTION, SOLUTION INTRAVENOUS at 17:01

## 2020-02-22 RX ADMIN — Medication 10 ML: at 21:19

## 2020-02-22 ASSESSMENT — PAIN DESCRIPTION - PROGRESSION: CLINICAL_PROGRESSION: GRADUALLY WORSENING

## 2020-02-22 ASSESSMENT — PAIN DESCRIPTION - PAIN TYPE: TYPE: ACUTE PAIN

## 2020-02-22 ASSESSMENT — PAIN DESCRIPTION - FREQUENCY: FREQUENCY: CONTINUOUS

## 2020-02-22 ASSESSMENT — ENCOUNTER SYMPTOMS
ABDOMINAL PAIN: 0
COLOR CHANGE: 0
SHORTNESS OF BREATH: 1
VOMITING: 0
BACK PAIN: 0

## 2020-02-22 ASSESSMENT — PAIN SCALES - GENERAL
PAINLEVEL_OUTOF10: 0
PAINLEVEL_OUTOF10: 5

## 2020-02-22 ASSESSMENT — PAIN DESCRIPTION - ORIENTATION: ORIENTATION: LEFT;MID

## 2020-02-22 ASSESSMENT — PAIN DESCRIPTION - DESCRIPTORS: DESCRIPTORS: PRESSURE

## 2020-02-22 ASSESSMENT — PAIN DESCRIPTION - ONSET: ONSET: GRADUAL

## 2020-02-22 ASSESSMENT — PAIN - FUNCTIONAL ASSESSMENT: PAIN_FUNCTIONAL_ASSESSMENT: ACTIVITIES ARE NOT PREVENTED

## 2020-02-22 ASSESSMENT — PAIN DESCRIPTION - LOCATION: LOCATION: CHEST

## 2020-02-22 ASSESSMENT — HEART SCORE: ECG: 1

## 2020-02-22 NOTE — ED PROVIDER NOTES
(TRULICITY) 1.5 HN/9.1PG SOPN    INJECT 1.5MG UNDER THE SKIN ONCE A WEEK    FLUTICASONE (FLONASE) 50 MCG/ACT NASAL SPRAY    1 spray by Nasal route daily    FLUTICASONE PROPIONATE (FLOVENT DISKUS) 100 MCG/BLIST AEPB INHALER    Inhale 2 puffs into the lungs daily    ISIBLOOM 0.15-30 MG-MCG PER TABLET    TAKE 1 TABLET BY MOUTH DAILY( ALTERNATE FOR ENSKYCE)    LANCET DEVICES (PRODIGY LANCING DEVICE) MISC    as needed    LORATADINE (CLARITIN) 10 MG TABLET    Take 10 mg by mouth daily. LOSARTAN (COZAAR) 25 MG TABLET    TAKE 1 TABLET BY MOUTH DAILY    METFORMIN (GLUCOPHAGE-XR) 500 MG EXTENDED RELEASE TABLET    TAKE 4 TABLETS BY MOUTH DAILY WITH BREAKFAST    MONTELUKAST (SINGULAIR) 10 MG TABLET    TAKE 1 TABLET BY MOUTH EVERY EVENING( GENERIC EQUIVALENT FOR SINGULAIR)    PANTOPRAZOLE (PROTONIX) 40 MG TABLET    Take 1 tablet by mouth daily    PIMECROLIMUS (ELIDEL) 1 % CREAM    Apply topically 2 times daily. PRODIGY LANCETS 28G MISC    1 each by Does not apply route 2 times daily    TRIAMCINOLONE (ARISTOCORT) 0.5 % OINTMENT    Apply topically 2 times daily. ALLERGIES     Fluoxetine; Lisinopril; Lexapro [escitalopram oxalate]; Methylisothiazolinone; and Sulfa antibiotics    FAMILY HISTORY           Problem Relation Age of Onset   Adelene Massac Cancer Sister         peritoneal    Diabetes Maternal Grandmother     Other Brother         SARA     Family Status   Relation Name Status    Sister  (Not Specified)    MGM  (Not Specified)    Brother  (Not Specified)        SOCIAL HISTORY      reports that she has never smoked. She has never used smokeless tobacco. She reports current alcohol use of about 1.0 standard drinks of alcohol per week. She reports that she does not use drugs.     PHYSICAL EXAM    (up to 7 for level 4, 8 or more for level 5)     ED Triage Vitals [02/22/20 1508]   BP Temp Temp Source Pulse Resp SpO2 Height Weight   129/68 98.5 °F (36.9 °C) Oral 118 18 98 % 5' 7\" (1.702 m) 293 lb 3.4 oz (133 kg)     Physical Prescriptions    No medications on file       (Please note that portions of this note were completed with a voice recognition program.  Efforts were made to edit the dictations but occasionally words are mis-transcribed.)    Geoffrey Easley, [id number]  02/22/20 1924

## 2020-02-23 VITALS
OXYGEN SATURATION: 96 % | WEIGHT: 286.82 LBS | HEIGHT: 67 IN | DIASTOLIC BLOOD PRESSURE: 60 MMHG | TEMPERATURE: 98.5 F | RESPIRATION RATE: 20 BRPM | SYSTOLIC BLOOD PRESSURE: 122 MMHG | BODY MASS INDEX: 45.02 KG/M2 | HEART RATE: 104 BPM

## 2020-02-23 LAB
ANION GAP SERPL CALCULATED.3IONS-SCNC: 14 MMOL/L (ref 3–16)
BUN BLDV-MCNC: 10 MG/DL (ref 7–20)
CALCIUM SERPL-MCNC: 8.9 MG/DL (ref 8.3–10.6)
CHLORIDE BLD-SCNC: 101 MMOL/L (ref 99–110)
CHOLESTEROL, TOTAL: 148 MG/DL (ref 0–199)
CO2: 22 MMOL/L (ref 21–32)
CREAT SERPL-MCNC: 0.6 MG/DL (ref 0.6–1.1)
EKG ATRIAL RATE: 125 BPM
EKG DIAGNOSIS: NORMAL
EKG P AXIS: 54 DEGREES
EKG P-R INTERVAL: 136 MS
EKG Q-T INTERVAL: 320 MS
EKG QRS DURATION: 78 MS
EKG QTC CALCULATION (BAZETT): 461 MS
EKG R AXIS: 52 DEGREES
EKG T AXIS: 27 DEGREES
EKG VENTRICULAR RATE: 125 BPM
ESTIMATED AVERAGE GLUCOSE: 116.9 MG/DL
GFR AFRICAN AMERICAN: >60
GFR NON-AFRICAN AMERICAN: >60
GLUCOSE BLD-MCNC: 122 MG/DL (ref 70–99)
HBA1C MFR BLD: 5.7 %
HCT VFR BLD CALC: 35.7 % (ref 36–48)
HDLC SERPL-MCNC: 63 MG/DL (ref 40–60)
HEMOGLOBIN: 11.8 G/DL (ref 12–16)
LDL CHOLESTEROL CALCULATED: 61 MG/DL
MCH RBC QN AUTO: 27.4 PG (ref 26–34)
MCHC RBC AUTO-ENTMCNC: 33.1 G/DL (ref 31–36)
MCV RBC AUTO: 82.6 FL (ref 80–100)
PDW BLD-RTO: 16.1 % (ref 12.4–15.4)
PLATELET # BLD: 254 K/UL (ref 135–450)
PMV BLD AUTO: 8.8 FL (ref 5–10.5)
POTASSIUM REFLEX MAGNESIUM: 3.8 MMOL/L (ref 3.5–5.1)
RBC # BLD: 4.32 M/UL (ref 4–5.2)
SODIUM BLD-SCNC: 137 MMOL/L (ref 136–145)
TRIGL SERPL-MCNC: 121 MG/DL (ref 0–150)
TROPONIN: <0.01 NG/ML
VLDLC SERPL CALC-MCNC: 24 MG/DL
WBC # BLD: 10.4 K/UL (ref 4–11)

## 2020-02-23 PROCEDURE — 80048 BASIC METABOLIC PNL TOTAL CA: CPT

## 2020-02-23 PROCEDURE — 6360000002 HC RX W HCPCS: Performed by: NURSE PRACTITIONER

## 2020-02-23 PROCEDURE — 2580000003 HC RX 258: Performed by: NURSE PRACTITIONER

## 2020-02-23 PROCEDURE — 83036 HEMOGLOBIN GLYCOSYLATED A1C: CPT

## 2020-02-23 PROCEDURE — G0378 HOSPITAL OBSERVATION PER HR: HCPCS

## 2020-02-23 PROCEDURE — 80061 LIPID PANEL: CPT

## 2020-02-23 PROCEDURE — 85027 COMPLETE CBC AUTOMATED: CPT

## 2020-02-23 PROCEDURE — 6370000000 HC RX 637 (ALT 250 FOR IP): Performed by: NURSE PRACTITIONER

## 2020-02-23 PROCEDURE — 96372 THER/PROPH/DIAG INJ SC/IM: CPT

## 2020-02-23 PROCEDURE — 36415 COLL VENOUS BLD VENIPUNCTURE: CPT

## 2020-02-23 PROCEDURE — 93010 ELECTROCARDIOGRAM REPORT: CPT | Performed by: INTERNAL MEDICINE

## 2020-02-23 PROCEDURE — 84484 ASSAY OF TROPONIN QUANT: CPT

## 2020-02-23 RX ADMIN — ASPIRIN 81 MG 81 MG: 81 TABLET ORAL at 08:17

## 2020-02-23 RX ADMIN — FLUTICASONE PROPIONATE 1 SPRAY: 50 SPRAY, METERED NASAL at 08:53

## 2020-02-23 RX ADMIN — PANTOPRAZOLE SODIUM 40 MG: 40 TABLET, DELAYED RELEASE ORAL at 08:18

## 2020-02-23 RX ADMIN — ENOXAPARIN SODIUM 40 MG: 40 INJECTION SUBCUTANEOUS at 08:18

## 2020-02-23 RX ADMIN — Medication 10 ML: at 08:18

## 2020-02-23 RX ADMIN — LOSARTAN POTASSIUM 25 MG: 25 TABLET ORAL at 08:17

## 2020-02-23 ASSESSMENT — PAIN SCALES - GENERAL: PAINLEVEL_OUTOF10: 0

## 2020-02-23 NOTE — DISCHARGE SUMMARY
allergic rhinitis      loratadine (CLARITIN) 10 MG tablet Take 10 mg by mouth daily. Current Discharge Medication List              Procedures: none    Assessment on Discharge: Stable, improved     Discharge ROS:  A complete review of systems was asked and negative except for denies chest pain, chest tightness or SOB    Discharge Exam:  /60   Pulse 104   Temp 98.5 °F (36.9 °C) (Oral)   Resp 20   Ht 5' 7\" (1.702 m)   Wt 286 lb 13.1 oz (130.1 kg)   LMP 02/13/2020   SpO2 96%   BMI 44.92 kg/m²     Gen: NAD, obese  HEENT: NC/AT, moist mucous membranes, no oropharyngeal erythema or exudate  Neck: supple, trachea midline, no anterior cervical or SC LAD  Heart:  Normal s1/s2, RRR, no murmurs, gallops, or rubs. no leg edema  Lungs:  clear bilaterally, no wheeze,no rales or rhonchi, no use of accessory muscles  Abd: bowel sounds present, soft, nontender, nondistended, no masses  Extrem:  No clubbing, cyanosis,  no edema  Skin: no lesion or masses  Psych:  A & O x3  Neuro: grossly intact, moves all four extremities    Pertinent Studies During Hospital Stay:  Radiology:  Xr Chest Portable    Result Date: 2/22/2020  EXAMINATION: ONE XRAY VIEW OF THE CHEST 2/22/2020 3:27 pm COMPARISON: 09/12/2018 HISTORY: ORDERING SYSTEM PROVIDED HISTORY: chest pain TECHNOLOGIST PROVIDED HISTORY: Reason for exam:->chest pain Reason for Exam: accidental OD on nitro FINDINGS: The lungs are clear. The cardiac silhouette is within normal limits. There is no pneumothorax or pleural effusion. 1.  No acute abnormality. Ct Chest Pulmonary Embolism W Contrast    Result Date: 2/22/2020  EXAMINATION: CTA OF THE CHEST 2/22/2020 3:43 pm TECHNIQUE: CTA of the chest was performed after the administration of intravenous contrast.  Multiplanar reformatted images are provided for review. MIP images are provided for review.  Dose modulation, iterative reconstruction, and/or weight based adjustment of the mA/kV was utilized to 0.5 0.0 - 1.3 K/uL    Eosinophils Absolute 0.0 0.0 - 0.6 K/uL    Basophils Absolute 0.1 0.0 - 0.2 K/uL   Basic Metabolic Panel w/ Reflex to MG    Collection Time: 02/22/20  3:26 PM   Result Value Ref Range    Sodium 137 136 - 145 mmol/L    Potassium reflex Magnesium 3.9 3.5 - 5.1 mmol/L    Chloride 101 99 - 110 mmol/L    CO2 20 (L) 21 - 32 mmol/L    Anion Gap 16 3 - 16    Glucose 116 (H) 70 - 99 mg/dL    BUN 9 7 - 20 mg/dL    CREATININE 0.6 0.6 - 1.1 mg/dL    GFR Non-African American >60 >60    GFR African American >60 >60    Calcium 9.4 8.3 - 10.6 mg/dL   Troponin    Collection Time: 02/22/20  3:26 PM   Result Value Ref Range    Troponin <0.01 <0.01 ng/mL   HCG Qualitative, Serum    Collection Time: 02/22/20  3:26 PM   Result Value Ref Range    hCG Qual Negative Detects HCG level >10 MIU/mL   Urine Drug Screen    Collection Time: 02/22/20  7:46 PM   Result Value Ref Range    Amphetamine Screen, Urine Neg Negative <1000ng/mL    Barbiturate Screen, Ur Neg Negative <200 ng/mL    Benzodiazepine Screen, Urine Neg Negative <200 ng/mL    Cannabinoid Scrn, Ur Neg Negative <50 ng/mL    Cocaine Metabolite Screen, Urine Neg Negative <300 ng/mL    Opiate Scrn, Ur Neg Negative <300 ng/mL    PCP Screen, Urine Neg Negative <25 ng/mL    Methadone Screen, Urine Neg Negative <300 ng/mL    Propoxyphene Scrn, Ur Neg Negative <300 ng/mL    Oxycodone Urine Neg Negative <100 ng/ml    pH, UA 5.0     Drug Screen Comment: see below    Troponin    Collection Time: 02/22/20  9:44 PM   Result Value Ref Range    Troponin <0.01 <0.01 ng/mL   Troponin    Collection Time: 02/23/20 12:09 AM   Result Value Ref Range    Troponin <0.01 <0.01 ng/mL   CBC    Collection Time: 02/23/20  6:24 AM   Result Value Ref Range    WBC 10.4 4.0 - 11.0 K/uL    RBC 4.32 4.00 - 5.20 M/uL    Hemoglobin 11.8 (L) 12.0 - 16.0 g/dL    Hematocrit 35.7 (L) 36.0 - 48.0 %    MCV 82.6 80.0 - 100.0 fL    MCH 27.4 26.0 - 34.0 pg    MCHC 33.1 31.0 - 36.0 g/dL    RDW 16.1 (H) 12.4

## 2020-02-23 NOTE — H&P
Hospital Medicine History & Physical      PCP: Shahana Perdomo MD    Date of Admission: 2/22/2020    Date of Service: Pt seen/examined on 2/22/2020 and Placed in Observation. Chief Complaint:  Chest pain      History Of Present Illness:      45 y.o. female with PMHx of DM and obesity presented to [facility] with left-sided chest pain. Pain radiates to the left shoulder and jaw. Patient does have associated shortness of breath and nausea but no diaphoresis. Pain began at rest.  She denies previous history of this pain. Patient took an entire bottle of nitroglycerin that she had at home for GI spasms. She did have relief of her pain. The ED provider discussed this with poison control. She was monitored and had no adverse effects. ED work-up revealed normal troponin and no EKG changes. Patient will be admitted for chest pain rule out. 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  Authorizing Provider   Cannabidiol 100 MG/ML SOLN PRN anxiety 2/11/20   Vicente Monahan MD   Dulaglutide (TRULICITY) 1.5 VT/9.1YC SOPN INJECT 1.5MG UNDER THE SKIN ONCE A WEEK 12/18/19   Vicente Monahan MD   losartan (COZAAR) 25 MG tablet TAKE 1 TABLET BY MOUTH DAILY 12/17/19   Vicente Monahan MD   fluticasone propionate (FLOVENT DISKUS) 100 MCG/BLIST AEPB inhaler Inhale 2 puffs into the lungs daily 11/23/19   Kristin Parr MD   ISIBLOOM 0.15-30 MG-MCG per tablet TAKE 1 TABLET BY MOUTH DAILY( ALTERNATE FOR ENSKYCE) 6/27/19   Kristin Parr MD   Biotin 5000 MCG CAPS Take 5,000 mcg by mouth    Historical MD Stew   pantoprazole (PROTONIX) 40 MG tablet Take 1 tablet by mouth daily 6/18/19   Kristin Parr MD   metFORMIN (GLUCOPHAGE-XR) 500 MG extended release tablet TAKE 4 TABLETS BY MOUTH DAILY WITH BREAKFAST 6/18/19   Kristin Parr MD   montelukast (SINGULAIR) 10 MG tablet TAKE 1 TABLET BY MOUTH EVERY EVENING( GENERIC EQUIVALENT FOR SINGULAIR) 6/18/19   Kristin Parr MD   ammonium lactate (LAC-HYDRIN) 12 % cream Apply topically as needed. 6/18/19 6/17/20  Kristin Parr MD   triamcinolone (ARISTOCORT) 0.5 % ointment Apply topically 2 times daily. 6/18/19   Kristin Parr MD   aspirin EC 81 MG EC tablet Take 1 tablet by mouth daily 6/18/19   Kristin Parr MD   blood glucose test strips (ASCENSIA AUTODISC VI;ONE TOUCH ULTRA TEST VI) strip twice a day 2/11/19   Kristin Parr MD   PRODIGY LANCETS 28G MISC 1 each by Does not apply route 2 times daily 2/11/19 1/17/20  Kristin Parr MD   Lancet Devices (PRODIGY LANCING DEVICE) MISC as needed 2/11/19   Kristin Parr MD   Blood Glucose Monitoring Suppl (PRODIGY POCKET BLOOD GLUCOSE) w/Device KIT as needed 2/11/19   Kristin Parr MD   pimecrolimus (ELIDEL) 1 % cream Apply topically 2 times daily. 2/8/19   Kristin Parr MD   dicyclomine (BENTYL) 20 MG tablet TAKE 1 TABLET BY MOUTH THREE TIMES DAILY AS NEEDED FOR CRAMPING 8/30/18   Kristin Parr MD   clotrimazole-betamethasone (LOTRISONE) 1-0.05 % cream Apply topically 2 times daily.  1/18/18   Kristin Parr MD   albuterol sulfate  (90 BASE) MCG/ACT inhaler Inhale 2 puffs into the lungs every 4 hours as needed for Wheezing May substitute for insurance preferred (Ventolin, Proventil, ProAir) 12/16/16 1/18/27  Kristin Parr MD   fluticasone (FLONASE) 50 MCG/ACT nasal spray 1 spray by Nasal route daily 11/5/15 2/9/26  Lor Handley MD   loratadine (CLARITIN) 10 MG tablet Take 10 mg by mouth daily. Historical Provider, MD       Allergies:  Fluoxetine; Lisinopril; Lexapro [escitalopram oxalate]; Methylisothiazolinone; and Sulfa antibiotics    Social History:      The patient currently lives at home    TOBACCO:   reports that she has never smoked. She has never used smokeless tobacco.  ETOH:   reports current alcohol use of about 1.0 standard drinks of alcohol per week. Family History:      Reviewed in detail positive as follows:        Problem Relation Age of Onset   [facility] Cancer Sister         peritoneal    Diabetes Maternal Grandmother     Other Brother         SARA       REVIEW OF SYSTEMS:   Pertinent positives as noted in the HPI. All other systems reviewed and negative. PHYSICAL EXAM PERFORMED:    /89   Pulse 120   Temp 98.5 °F (36.9 °C) (Oral)   Resp 18   Ht 5' 7\" (1.702 m)   Wt 293 lb 3.4 oz (133 kg)   LMP 02/13/2020   SpO2 96%   BMI 45.92 kg/m²     General appearance: Well-developed, well-nourished,  female in no apparent distress, appears stated age and cooperative. HEENT:  Normal cephalic, atraumatic without obvious deformity. Pupils equal, round, and reactive to light. Extra ocular muscles intact. Conjunctivae/corneas clear. Neck: Supple, with full range of motion. No jugular venous distention. Trachea midline. Respiratory:  Normal respiratory effort. Clear to auscultation, bilaterally without Rales/Wheezes/Rhonchi. Cardiovascular:  Regular rate and rhythm without murmurs, rubs or gallops. Abdomen: Soft, obese abdomen, non-tender, non-distended, without rebound or guarding. Normal bowel sounds. Musculoskeletal:  No clubbing, cyanosis or edema bilaterally. Full range of motion without deformity. Skin: Skin color, texture, turgor normal.  No rashes or lesions. Neurologic:  Neurovascularly intact without any focal sensory/motor deficits.  Cranial nerves: II-XII intact, grossly non-focal.  Psychiatric:  Alert and oriented, thought content appropriate, normal insight  Capillary Refill: Brisk,< 3 seconds   Peripheral Pulses: +2 palpable, equal bilaterally       Labs:     Recent Labs     02/22/20  1526   WBC 12.7*   HGB 12.5   HCT 38.1        Recent Labs     02/22/20  1526      K 3.9      CO2 20*   BUN 9   CREATININE 0.6   CALCIUM 9.4     No results for input(s): AST, ALT, BILIDIR, BILITOT, ALKPHOS in the last 72 hours. No results for input(s): INR in the last 72 hours. Recent Labs     02/22/20  1526 02/22/20  2144 02/23/20  0009   TROPONINI <0.01 <0.01 <0.01       Urinalysis:    No results found for: [place] Ore, BACTERIA, RBCUA, BLOODU, SPECGRAV, GLUCOSEU    Radiology:     EKG:  I have reviewed the EKG with the following interpretation: Sinus tachycardia, ventricular rate 125. No acute ST elevation    CT CHEST PULMONARY EMBOLISM W CONTRAST   Final Result   1. No acute central pulmonary thromboembolus. 1. .         XR CHEST PORTABLE   Final Result   1. No acute abnormality. ASSESSMENT:    Active Hospital Problems    Diagnosis Date Noted    Chest pain [R07.9] 02/22/2020    Type 2 diabetes mellitus with diabetic nephropathy ([facility] [address] [E11.21] 03/02/2017         PLAN:    44-year-old  female with diabetes and obesity presents with left-sided chest pain. Low risk for anginal pain. Troponin was negative in the emergency department. Patient will be a candidate for outpatient stress test if troponins are negative.     Chest pain  - ECG shows no ST/T abnormalities  - troponins neg, will trend x 2 more draws  - ASA, statin  - check lipid panel  - explained that if the evaluation does not show that the symptoms are secondary to ischemic changes, she will be discharged and instructed to follow up with her PCP for continued work up     DM  - hold home po meds, sliding scale, CCD      DVT Prophylaxis: Lovenox  Diet: DIET CARB CONTROL;

## 2020-02-23 NOTE — PLAN OF CARE
Discharge order put in. VSS on room air, A+Ox4. 2 IV's removed without complications. Discharge instructions reviewed with pt and family members. All needs are meet at this time. Will continue to monitor until the pt leaves the floor.  Electronically signed by Basil Marquis RN on 2/23/2020 at 12:06 PM

## 2020-02-23 NOTE — PLAN OF CARE
Problem: Pain Control  Goal: Maintain pain level at or below patient's acceptable level (or 5 if patient is unable to determine acceptable level)  Outcome: Ongoing     Problem: Respiratory  Goal: No pulmonary complications  Outcome: Ongoing     Problem: GI  Goal: No bowel complications  Outcome: Ongoing

## 2020-02-23 NOTE — CARE COORDINATION
Discharge order noted. Chart has been reviewed. No discharge needs identified at this time.      Electronically signed by Homer Alvarez RN on 2/23/2020 at 11:46 AM

## 2020-03-03 ENCOUNTER — OFFICE VISIT (OUTPATIENT)
Dept: FAMILY MEDICINE CLINIC | Age: 45
End: 2020-03-03
Payer: COMMERCIAL

## 2020-03-03 VITALS
OXYGEN SATURATION: 98 % | RESPIRATION RATE: 10 BRPM | WEIGHT: 291 LBS | SYSTOLIC BLOOD PRESSURE: 140 MMHG | DIASTOLIC BLOOD PRESSURE: 70 MMHG | HEART RATE: 104 BPM | BODY MASS INDEX: 45.58 KG/M2

## 2020-03-03 PROBLEM — R73.03 PREDIABETES: Status: ACTIVE | Noted: 2020-03-03

## 2020-03-03 PROCEDURE — 99214 OFFICE O/P EST MOD 30 MIN: CPT | Performed by: NURSE PRACTITIONER

## 2020-03-03 RX ORDER — AZELASTINE 1 MG/ML
1 SPRAY, METERED NASAL 2 TIMES DAILY
COMMUNITY
End: 2021-09-17 | Stop reason: SDUPTHER

## 2020-03-03 ASSESSMENT — ENCOUNTER SYMPTOMS
SHORTNESS OF BREATH: 1
WHEEZING: 0
COUGH: 0

## 2020-03-03 NOTE — PROGRESS NOTES
MCG/ACT inhaler Inhale 2 puffs into the lungs every 4 hours as needed for Wheezing May substitute for insurance preferred (Ventolin, Proventil, ProAir) 1 Inhaler 3    fluticasone (FLONASE) 50 MCG/ACT nasal spray 1 spray by Nasal route daily 1 Bottle 5    loratadine (CLARITIN) 10 MG tablet Take 10 mg by mouth daily.  ammonium lactate (LAC-HYDRIN) 12 % cream Apply topically as needed. (Patient not taking: Reported on 3/3/2020) 1 Bottle 4    blood glucose test strips (ASCENSIA AUTODISC VI;ONE TOUCH ULTRA TEST VI) strip twice a day (Patient not taking: Reported on 3/3/2020) 200 strip 3    PRODIGY LANCETS 28G MISC 1 each by Does not apply route 2 times daily 200 each 3    Lancet Devices (PRODIGY LANCING DEVICE) MISC as needed (Patient not taking: Reported on 3/3/2020) 1 each 0    Blood Glucose Monitoring Suppl (PRODIGY POCKET BLOOD GLUCOSE) w/Device KIT as needed (Patient not taking: Reported on 3/3/2020) 1 kit 0     No current facility-administered medications for this visit. Allergies   Allergen Reactions    Fluoxetine Other (See Comments)     Overtim, head ache    Lisinopril Other (See Comments)     cough    Lexapro [Escitalopram Oxalate] Other (See Comments)     sleepy    Methylisothiazolinone Itching and Rash    Sulfa Antibiotics Rash       Review of Systems   Constitutional: Positive for fatigue. Negative for activity change and fever. Respiratory: Positive for shortness of breath. Negative for cough and wheezing. Cardiovascular: Negative for chest pain and leg swelling. Neurological: Negative for dizziness, syncope and headaches. Vitals:    03/03/20 1418 03/03/20 1448   BP: (!) 126/92 (!) 140/70   Site: Right Upper Arm Left Upper Arm   Position: Sitting Sitting   Cuff Size: Large Adult Large Adult   Pulse: 104    Resp: 10    SpO2: 98%    Weight: 291 lb (132 kg)        Body mass index is 45.58 kg/m².      Wt Readings from Last 3 Encounters:   03/03/20 291 lb (132 kg)   02/23/20

## 2020-03-09 RX ORDER — DULAGLUTIDE 1.5 MG/.5ML
INJECTION, SOLUTION SUBCUTANEOUS
Qty: 2 ML | Refills: 2 | Status: SHIPPED | OUTPATIENT
Start: 2020-03-09 | End: 2020-06-01

## 2020-03-09 RX ORDER — LOSARTAN POTASSIUM 25 MG/1
TABLET ORAL
Qty: 90 TABLET | Refills: 1 | Status: SHIPPED | OUTPATIENT
Start: 2020-03-09 | End: 2020-09-01

## 2020-03-09 NOTE — TELEPHONE ENCOUNTER
I called pharmacy to confirm this was needed due to it was sent in 12/19 for 90 days with 1 refill but pharmacy states they never received order

## 2020-03-10 ENCOUNTER — TELEPHONE (OUTPATIENT)
Dept: FAMILY MEDICINE CLINIC | Age: 45
End: 2020-03-10

## 2020-03-10 NOTE — TELEPHONE ENCOUNTER
Called and left patient a message to return my call to discuss [employer] results. My recommendation would be to try zoloft or viibryd for anxiety. Asked that she return my call when I am back in the office on 3/12/20.

## 2020-03-11 ENCOUNTER — TELEPHONE (OUTPATIENT)
Dept: FAMILY MEDICINE CLINIC | Age: 45
End: 2020-03-11

## 2020-03-11 NOTE — TELEPHONE ENCOUNTER
Reyna Villaseñor, is it okay if I reply to your voice message this way? I'm open to trying either Zoloft or Viibryd. My only concern is that they both show interactions with cough medicines. With Covid 19 coming through, is it wise to start one of these now? Working at [employer], which is unlikely to go all online like the [employer] are doing, I'm likely to be exposed soon, although I'm following the recommended precautions. And anything that gets in the lungs tends to hit me hard. But I know not everyone has lung or any symptoms even with exposure. Even so, might it be smart to wait another week or two and see how things stand? I'll do whatever you recommend. Please respond to this thru my chart. The pt accidentally put this in her husbands chart so I copied it from his and put it in hers so she did not have to re do it.     Please advise    thanks

## 2020-03-16 ENCOUNTER — E-VISIT (OUTPATIENT)
Dept: FAMILY MEDICINE CLINIC | Facility: CLINIC | Age: 45
End: 2020-03-16
Payer: COMMERCIAL

## 2020-03-16 PROCEDURE — 98970 NQHP OL DIG ASSMT&MGMT 5-10: CPT | Performed by: NURSE PRACTITIONER

## 2020-03-17 RX ORDER — CLOBETASOL PROPIONATE 0.05 G/ML
1 SPRAY TOPICAL DAILY
Qty: 125 ML | Refills: 1 | Status: SHIPPED | OUTPATIENT
Start: 2020-03-17 | End: 2022-06-21 | Stop reason: ALTCHOICE

## 2020-03-20 ENCOUNTER — HOSPITAL ENCOUNTER (OUTPATIENT)
Dept: NON INVASIVE DIAGNOSTICS | Age: 45
Discharge: HOME | End: 2020-03-20
Payer: COMMERCIAL

## 2020-03-20 LAB
LV EF: 70 %
LVEF MODALITY: NORMAL

## 2020-03-20 PROCEDURE — 93017 CV STRESS TEST TRACING ONLY: CPT

## 2020-03-20 PROCEDURE — 78452 HT MUSCLE IMAGE SPECT MULT: CPT

## 2020-03-20 PROCEDURE — 3430000000 HC RX DIAGNOSTIC RADIOPHARMACEUTICAL: Performed by: NURSE PRACTITIONER

## 2020-03-20 PROCEDURE — A9502 TC99M TETROFOSMIN: HCPCS | Performed by: NURSE PRACTITIONER

## 2020-03-20 RX ADMIN — TETROFOSMIN 30 MILLICURIE: 1.38 INJECTION, POWDER, LYOPHILIZED, FOR SOLUTION INTRAVENOUS at 10:32

## 2020-03-20 RX ADMIN — TETROFOSMIN 10 MILLICURIE: 1.38 INJECTION, POWDER, LYOPHILIZED, FOR SOLUTION INTRAVENOUS at 09:16

## 2020-04-06 ENCOUNTER — TELEMEDICINE (OUTPATIENT)
Dept: FAMILY MEDICINE CLINIC | Age: 45
End: 2020-04-06

## 2020-04-06 PROCEDURE — 99214 OFFICE O/P EST MOD 30 MIN: CPT | Performed by: FAMILY MEDICINE

## 2020-04-06 RX ORDER — FLUCONAZOLE 150 MG/1
150 TABLET ORAL ONCE
Qty: 2 TABLET | Refills: 0 | Status: SHIPPED | OUTPATIENT
Start: 2020-04-06 | End: 2020-04-06

## 2020-04-06 NOTE — PROGRESS NOTES
UNDER THE SKIN 1 TIME A WEEK  Jose Cherry MD   losartan (COZAAR) 25 MG tablet TAKE 1 TABLET BY MOUTH DAILY  Karla Angel MD   azelastine (ASTELIN) 0.1 % nasal spray 1 spray by Nasal route 2 times daily Use in each nostril as directed  Historical Provider, MD   Cannabidiol 100 MG/ML SOLN PRN anxiety  Jose Cherry MD   fluticasone propionate (FLOVENT DISKUS) 100 MCG/BLIST AEPB inhaler Inhale 2 puffs into the lungs daily  Jose Cherry MD   ISIBLOOM 0.15-30 MG-MCG per tablet TAKE 1 TABLET BY MOUTH DAILY( ALTERNATE FOR ENSKYCE)  Jose Cherry MD   Biotin 5000 MCG CAPS Take 5,000 mcg by mouth  Historical Provider, MD   pantoprazole (PROTONIX) 40 MG tablet Take 1 tablet by mouth daily  Patient taking differently: Take 40 mg by mouth 2 times daily   Jose Cherry MD   metFORMIN (GLUCOPHAGE-XR) 500 MG extended release tablet TAKE 4 TABLETS BY MOUTH DAILY WITH BREAKFAST  Jose Cherry MD   montelukast (SINGULAIR) 10 MG tablet TAKE 1 TABLET BY MOUTH EVERY EVENING( Marcos Francisco)  Jose Cherry MD   ammonium lactate (LAC-HYDRIN) 12 % cream Apply topically as needed. Patient not taking: Reported on 3/3/2020  Jose Cherry MD   triamcinolone (ARISTOCORT) 0.5 % ointment Apply topically 2 times daily. Jose Cherry MD   aspirin EC 81 MG EC tablet Take 1 tablet by mouth daily  Jose Cherry MD   blood glucose test strips (ASCENSIA AUTODISC VI;ONE TOUCH ULTRA TEST VI) strip twice a day  Patient not taking: Reported on 3/3/2020  MD DAMIAN RangelY LANCETS 28G MISC 1 each by Does not apply route 2 times daily  Jose Cherry MD   Lancet Devices ([employer] LANCING DEVICE) [facility] as needed  Patient not taking: Reported on 3/3/2020  Jose Cherry MD   Blood Glucose Monitoring Suppl (PRODIGY POCKET BLOOD GLUCOSE) w/Device KIT as needed  Patient not taking: Reported on 3/3/2020  Jose Cherry MD   pimecrolimus (ELIDEL) 1 % cream Apply topically 2 times daily.   Jose Cherry MD   dicyclomine (BENTYL) 20 02/23/2020        Objective:   PHYSICAL EXAM:  Vitals (if available)      GENERAL:   · well-developed, well-nourished, alert, no distress. EYES:   · External findings: lids and lashes normal and conjunctivae and sclerae normal  · Eyes: no periorbital cellulitis. HENT:   · Normocephalic, atraumatic  · External nose and ears appear normal  · Mucous membranes are moist  · Hearing grossly normal.     NECK: No visible masses  LUNGS:    · Respiratory effort normal.  · No visualized signs of difficulty breathing or respiratory distress  SKIN: warm and dry  · No significant exanthematous lesions or discoloration noted on facial skin  PSYCH:    · Alert and oriented, able to follow commands  · Normal reasoning, insight good  · Normal affect  · No memory disturbance noted  NEURO:   No Facial Asymmetry (Cranial nerve 7 motor function) (limited exam to video visit)      No gaze palsy      Assessment and Plan:      Diagnosis Orders   1. Type 2 diabetes mellitus with diabetic nephropathy, unspecified whether long term insulin use ([facility]     2. Yeast vaginitis  fluconazole (DIFLUCAN) 150 MG tablet   3. Gastroesophageal reflux disease, esophagitis presence not specified     4. Esophageal spasm     Plan below. INSTRUCTIONS  · NEXT APPOINTMENT: Please schedule check-up in 3 months. · Take diflucan. · Continue aspirin 81 mg.    Virtual Visit (video visit) encounter employed to address concerns as mentioned above. A caregiver was present when appropriate. Due to this being a TeleHealth encounter (During VJXQY-75 public health emergency), evaluation of the following organ systems was limited.  Pursuant to the emergency declaration under the [facility] [address] and the [employer] Act, this Virtual Visit was conducted with patient's (and/or legal guardian's) consent, to reduce the patient's risk of exposure to COVID-19 and

## 2020-04-08 RX ORDER — MONTELUKAST SODIUM 10 MG/1
TABLET ORAL
Qty: 90 TABLET | Refills: 3 | Status: SHIPPED | OUTPATIENT
Start: 2020-04-08 | End: 2021-03-22

## 2020-04-23 RX ORDER — FLUCONAZOLE 100 MG/1
100 TABLET ORAL DAILY
Qty: 7 TABLET | Refills: 0 | Status: SHIPPED | OUTPATIENT
Start: 2020-04-23 | End: 2020-04-30

## 2020-04-24 ENCOUNTER — PATIENT MESSAGE (OUTPATIENT)
Dept: FAMILY MEDICINE CLINIC | Age: 45
End: 2020-04-24

## 2020-05-04 ENCOUNTER — TELEPHONE (OUTPATIENT)
Dept: FAMILY MEDICINE CLINIC | Age: 45
End: 2020-05-04

## 2020-05-06 ENCOUNTER — OFFICE VISIT (OUTPATIENT)
Dept: FAMILY MEDICINE CLINIC | Age: 45
End: 2020-05-06
Payer: COMMERCIAL

## 2020-05-06 VITALS
DIASTOLIC BLOOD PRESSURE: 86 MMHG | WEIGHT: 285.6 LBS | BODY MASS INDEX: 44.83 KG/M2 | SYSTOLIC BLOOD PRESSURE: 132 MMHG | HEIGHT: 67 IN

## 2020-05-06 LAB
BACTERIA: ABNORMAL /HPF
BILIRUBIN URINE: NEGATIVE
BILIRUBIN, POC: ABNORMAL
BLOOD URINE, POC: ABNORMAL
BLOOD, URINE: ABNORMAL
CLARITY, POC: ABNORMAL
CLARITY: ABNORMAL
COLOR, POC: ABNORMAL
COLOR: ABNORMAL
EPITHELIAL CELLS, UA: 1 /HPF (ref 0–5)
GLUCOSE URINE, POC: ABNORMAL
GLUCOSE URINE: NEGATIVE MG/DL
KETONES, POC: ABNORMAL
KETONES, URINE: ABNORMAL MG/DL
LEUKOCYTE EST, POC: ABNORMAL
LEUKOCYTE ESTERASE, URINE: ABNORMAL
MICROSCOPIC EXAMINATION: YES
MUCUS: ABNORMAL /LPF
NITRITE, POC: ABNORMAL
NITRITE, URINE: NEGATIVE
PH UA: 5.5 (ref 5–8)
PH, POC: 5.5
PROTEIN UA: 30 MG/DL
PROTEIN, POC: 30
RBC UA: 6 /HPF (ref 0–4)
SPECIFIC GRAVITY UA: 1.02 (ref 1–1.03)
SPECIFIC GRAVITY, POC: 1.02
URINE REFLEX TO CULTURE: YES
URINE TYPE: ABNORMAL
UROBILINOGEN, POC: 0.2
UROBILINOGEN, URINE: 0.2 E.U./DL
WBC UA: 80 /HPF (ref 0–5)

## 2020-05-06 PROCEDURE — 81002 URINALYSIS NONAUTO W/O SCOPE: CPT | Performed by: FAMILY MEDICINE

## 2020-05-06 PROCEDURE — 99213 OFFICE O/P EST LOW 20 MIN: CPT | Performed by: FAMILY MEDICINE

## 2020-05-06 PROCEDURE — 81003 URINALYSIS AUTO W/O SCOPE: CPT | Performed by: FAMILY MEDICINE

## 2020-05-06 NOTE — PROGRESS NOTES
Values used to calculate the score:      Age: 45 years      Sex: Female      Is Non- : No      Diabetic: Yes      Tobacco smoker: No      Systolic Blood Pressure: 831 mmHg      Is BP treated: No      HDL Cholesterol: 63 mg/dL      Total Cholesterol: 148 mg/dL  Prior to Visit Medications    Medication Sig Taking? Authorizing Provider   ISIBLOOM 0.15-30 MG-MCG per tablet TAKE 1 TABLET BY MOUTH DAILY( ALTERNATE FOR ENSKYCE) Yes Akua Fernando MD   montelukast (SINGULAIR) 10 MG tablet TAKE 1 TABLET BY MOUTH EVERY EVENING Yes Akua Fernando MD   Clobetasol Propionate 0.05 % LIQD Apply 1 applicator topically daily Yes Chato Robb APRN - CNP   Dulaglutide (TRULICITY) 1.5 AL/8.9RG SOPN ADMINISTER 0.5 ML UNDER THE SKIN 1 TIME A WEEK Yes Akua Fernando MD   losartan (COZAAR) 25 MG tablet TAKE 1 TABLET BY MOUTH DAILY Yes Michael Knott MD   azelastine (ASTELIN) 0.1 % nasal spray 1 spray by Nasal route 2 times daily Use in each nostril as directed Yes Historical Provider, MD   Cannabidiol 100 MG/ML SOLN PRN anxiety Yes Akua Fernando MD   fluticasone propionate (FLOVENT DISKUS) 100 MCG/BLIST AEPB inhaler Inhale 2 puffs into the lungs daily Yes Akua Fernando MD   Biotin 5000 MCG CAPS Take 5,000 mcg by mouth Yes Historical Provider, MD   pantoprazole (PROTONIX) 40 MG tablet Take 1 tablet by mouth daily  Patient taking differently: Take 40 mg by mouth 2 times daily  Yes Akua Fernando MD   metFORMIN (GLUCOPHAGE-XR) 500 MG extended release tablet TAKE 4 TABLETS BY MOUTH DAILY WITH BREAKFAST Yes Akua Fernando MD   ammonium lactate (LAC-HYDRIN) 12 % cream Apply topically as needed. Yes Akua Fernando MD   triamcinolone (ARISTOCORT) 0.5 % ointment Apply topically 2 times daily.  Yes Akua Fernando MD   aspirin EC 81 MG EC tablet Take 1 tablet by mouth daily Yes Akua Fernando MD   blood glucose test strips (ASCENSIA AUTODISC VI;ONE TOUCH ULTRA TEST VI) strip twice a day Yes Akua Fernando MD   Lancet Devices Component Value Date    WBC 10.4 02/23/2020    HGB 11.8 (L) 02/23/2020    HCT 35.7 (L) 02/23/2020    MCV 82.6 02/23/2020     02/23/2020     Lab Results   Component Value Date    ALT 8 (L) 06/18/2019    AST 10 (L) 06/18/2019    ALKPHOS 95 06/18/2019    BILITOT 0.3 06/18/2019     TSH (uIU/mL)   Date Value   09/12/2018 2.16     Lab Results   Component Value Date    LABA1C 5.7 02/23/2020     Objective:   PHYSICAL EXAM  /86   Ht 5' 7\" (1.702 m)   Wt 285 lb 9.6 oz (129.5 kg)   LMP 04/22/2020   BMI 44.73 kg/m²   Wt Readings from Last 3 Encounters:   05/06/20 285 lb 9.6 oz (129.5 kg)   03/03/20 291 lb (132 kg)   02/23/20 286 lb 13.1 oz (130.1 kg)     BP Readings from Last 3 Encounters:   05/06/20 132/86   03/03/20 (!) 140/70   02/23/20 122/60     GENERAL: well-developed, well-nourished, alert, no distress   Female:    · Vulva with no masses or lesions  · Urethra normal  · Vaginal and cervix with mucoid discharge. Inflammed endocervical polyp again noted. · Exam limited by body habitus     Assessment and Plan:      Diagnosis Orders   1. Chronic vaginitis  Vaginal Pathogens Probes *A    URINE RT REFLEX TO CULTURE   2. Endocervical polyp  Ambulatory referral to Gynecology    Plan as above and below. · Will call with results. · See GYN to remove polyp.

## 2020-05-06 NOTE — PATIENT INSTRUCTIONS
· Will call with results. · See GYN to remove polyp. Patient Education       BACTERIAL VAGINOSIS    Overview   What is bacterial vaginosis? Bacterial vaginosis is a mild infection in the vagina caused by a type of bacteria (germ). The vagina normally contains a lot of \"good\" bacteria, called lactobacilli (say: \"lack-toe yates-kavin-li\"), and a few other types of bacteria, called anaerobes (say: \"mario-air-robes\"). Too many anaerobes can cause bacterial vaginosis. It is not known why the anaerobe bacteria overgrow and cause this infection. Symptoms   How do I know I have bacterial vaginosis? You may notice a discharge from your vagina. The discharge may be clear or colored. It may be very light or heavy. It may have a fishy smell, especially after you have sexual intercourse. Some women have bacterial vaginosis without any symptoms. Causes & Risk Factors   How did I get bacterial vaginosis? Bacterial vaginosis is an overgrowth of bacteria that are normally in the vagina. Researchers do not completely understand why it occurs. However, activities such as douching or having a new sexual partner or multiple sexual partners can put you at greater risk for bacterial vaginosis. While it's more common in women who are sexually active, it also occurs in women who are not sexually active. It's not usually necessary for your sex partner to be treated. Diagnosis & Tests   How can my doctor tell if I have bacterial vaginosis? Your doctor will examine your vagina and use a cotton swab to get a sample of the discharge. This sample will be tested. Treatment   Does it have to be treated? Yes. If the infection isn't treated, the bacteria may get up into the uterus or the fallopian tubes and cause more serious infections. Treating bacterial vaginosis lowers this risk. Treatment is especially important in pregnant women. How is bacterial vaginosis treated? It can be treated in one of several ways.  Your doctor may prescribe pills for you to take by mouth, or a cream or gel to put in your vagina. It's important to use your medicine exactly as your doctor tells you. If your doctor prescribes metronidazole or other medicines, don't drink any alcohol while taking the medicine or for 24 hours afterward. Combining alcohol with these medicines can cause nausea and vomiting. Even the small amount of alcohol in many cough syrups can cause nausea and vomiting if you're taking metronidazole. Also, be sure to tell your doctor about any other medicines you are currently taking. VAGINAL YEAST INFECTION    Vaginal yeast infection is an infection of the vagina, most commonly due to the fungus Wendy albicans. Causes  Most women will have a vaginal yeast infection at some time. Candida albicans is a common type of fungus. It is often present in small amounts in the vagina, mouth, digestive tract, and on the skin. Usually it does not cause disease or symptoms. Candida and the many other germs or microorganisms that normally live in the vagina keep each other in balance. However, when the vagina has certain favorable conditions, the number of Candida albicans increases, leading to a yeast infection. Some of these favorable conditions include: Antibiotics used to treat other types of infections change the normal balance between organisms in the vagina by decreasing the number of protective bacteria. Being pregnant, having diabetes, or being obese all create conditions that help yeast grow more easily. Vaginal candidiasis is not a sexually transmitted disease. However, a small number of men will develop symptoms such as itching and a rash on the penis after having sexual contact with an infected partner. Having many vaginal yeast infections may be a sign of other health problems. Other vaginal infections and discharges can be mistaken for a vaginal yeast infection.   Repeat infections that occur immediately after treatment, or a yeast infection that does not respond to any treatment, may be an early sign that a person is infected with HIV. Symptoms  Abnormal vaginal discharge   Ranges from a slightly watery, white discharge to a thick, white, chunky discharge (like cottage cheese)  Pain with intercourse   Painful urination   Redness and swelling of the vulva   Vaginal and labial itching, burning    Exams and Tests  A pelvic examination will be done. It may show swelling (inflammation) of the skin of the vulva, in the vagina, and on the cervix. The health care provider may find dry, white plaques on the vaginal wall. A small amount of the vaginal discharge is examined using a microscope (called a wet mount and KOH test). It will show Candida. Sometimes, a culture is taken when the infection does not improve with treatment or comes back many times. Your doctor may also choose to do tests to find other causes of your symptoms. Treatment  Medications for vaginal yeast infections are available in either vaginal creams or suppositories. Most can be bought without a prescription. Treating yourself at home is probably okay if:  Your symptoms are mild and you do not have pelvic pain or a fever   This is not your first yeast infection and you have not had many yeast infections in the past   You are not pregnant   You are not worried about other sexually transmitted diseases from recent sexual contact  If your symptoms do not get better after one course of these medicines, or they become worse, see your health care provider. If your symptoms go away, but then return over the next several weeks or months, also see your health care provider. Medications you can buy yourself to treat a vaginal yeast infection are miconazole, clotrimazole, tioconazole, and butoconazole. Read the packages carefully and use them as directed. Do not stop using these medications early because your symptoms are better.  You will need a 3 - 7-day

## 2020-05-07 LAB
CANDIDA SPECIES, DNA PROBE: ABNORMAL
GARDNERELLA VAGINALIS, DNA PROBE: ABNORMAL
TRICHOMONAS VAGINALIS DNA: ABNORMAL

## 2020-05-08 LAB
ORGANISM: ABNORMAL
URINE CULTURE, ROUTINE: ABNORMAL

## 2020-05-08 RX ORDER — METRONIDAZOLE 500 MG/1
500 TABLET ORAL 2 TIMES DAILY
Qty: 14 TABLET | Refills: 0 | Status: SHIPPED | OUTPATIENT
Start: 2020-05-08 | End: 2020-05-15

## 2020-05-08 RX ORDER — CIPROFLOXACIN 250 MG/1
250 TABLET, FILM COATED ORAL 2 TIMES DAILY
Qty: 14 TABLET | Refills: 0 | Status: SHIPPED | OUTPATIENT
Start: 2020-05-08 | End: 2020-05-15

## 2020-05-08 RX ORDER — PANTOPRAZOLE SODIUM 40 MG/1
40 TABLET, DELAYED RELEASE ORAL 2 TIMES DAILY
Qty: 180 TABLET | Refills: 0 | Status: SHIPPED | OUTPATIENT
Start: 2020-05-08 | End: 2020-08-05

## 2020-05-19 ENCOUNTER — PATIENT MESSAGE (OUTPATIENT)
Dept: FAMILY MEDICINE CLINIC | Age: 45
End: 2020-05-19

## 2020-05-27 ENCOUNTER — OFFICE VISIT (OUTPATIENT)
Dept: GYNECOLOGY | Age: 45
End: 2020-05-27
Payer: COMMERCIAL

## 2020-05-27 VITALS
BODY MASS INDEX: 44.17 KG/M2 | HEART RATE: 106 BPM | HEIGHT: 67 IN | TEMPERATURE: 99.1 F | SYSTOLIC BLOOD PRESSURE: 119 MMHG | WEIGHT: 281.4 LBS | DIASTOLIC BLOOD PRESSURE: 79 MMHG | RESPIRATION RATE: 16 BRPM

## 2020-05-27 PROCEDURE — 57500 BIOPSY OF CERVIX: CPT | Performed by: OBSTETRICS & GYNECOLOGY

## 2020-05-27 PROCEDURE — 99204 OFFICE O/P NEW MOD 45 MIN: CPT | Performed by: OBSTETRICS & GYNECOLOGY

## 2020-05-28 ENCOUNTER — TELEPHONE (OUTPATIENT)
Dept: GYNECOLOGY | Age: 45
End: 2020-05-28

## 2020-05-28 RX ORDER — METRONIDAZOLE 500 MG/1
500 TABLET ORAL 2 TIMES DAILY
Qty: 14 TABLET | Refills: 0 | Status: SHIPPED | OUTPATIENT
Start: 2020-05-28 | End: 2020-06-04

## 2020-05-28 RX ORDER — FLUCONAZOLE 150 MG/1
150 TABLET ORAL ONCE
Qty: 1 TABLET | Refills: 0 | Status: SHIPPED | OUTPATIENT
Start: 2020-05-28 | End: 2020-05-28

## 2020-05-28 NOTE — TELEPHONE ENCOUNTER
I called pt and relayed message that she has bacterial vaginosis and yeast, Flagyl is being pended to her pharmacy 500 mg po bid X 7 days dispense 14, also one tablet diflucan, her choice 1 tablet for yeast

## 2020-06-01 RX ORDER — DULAGLUTIDE 1.5 MG/.5ML
INJECTION, SOLUTION SUBCUTANEOUS
Qty: 2 ML | Refills: 2 | Status: SHIPPED | OUTPATIENT
Start: 2020-06-01 | End: 2020-08-26

## 2020-07-03 ENCOUNTER — OFFICE VISIT (OUTPATIENT)
Dept: PRIMARY CARE CLINIC | Age: 45
End: 2020-07-03
Payer: COMMERCIAL

## 2020-07-03 PROCEDURE — 99211 OFF/OP EST MAY X REQ PHY/QHP: CPT | Performed by: NURSE PRACTITIONER

## 2020-07-03 NOTE — PROGRESS NOTES
Sunshine Copeland received a viral test for COVID-19. They were educated on isolation and quarantine as appropriate. For any symptoms, they were directed to seek care from their PCP, given contact information to establish with a doctor, directed to an urgent care or the emergency room.

## 2020-07-06 LAB
SARS-COV-2: NOT DETECTED
SOURCE: NORMAL

## 2020-07-06 RX ORDER — AMOXICILLIN AND CLAVULANATE POTASSIUM 875; 125 MG/1; MG/1
1 TABLET, FILM COATED ORAL 2 TIMES DAILY
Qty: 20 TABLET | Refills: 0 | Status: SHIPPED | OUTPATIENT
Start: 2020-07-06 | End: 2020-07-15 | Stop reason: ALTCHOICE

## 2020-07-15 ENCOUNTER — OFFICE VISIT (OUTPATIENT)
Dept: GYNECOLOGY | Age: 45
End: 2020-07-15
Payer: COMMERCIAL

## 2020-07-15 VITALS
RESPIRATION RATE: 16 BRPM | TEMPERATURE: 97.3 F | BODY MASS INDEX: 44.61 KG/M2 | HEART RATE: 106 BPM | SYSTOLIC BLOOD PRESSURE: 141 MMHG | WEIGHT: 284.2 LBS | DIASTOLIC BLOOD PRESSURE: 75 MMHG | HEIGHT: 67 IN | OXYGEN SATURATION: 100 %

## 2020-07-15 PROCEDURE — 99213 OFFICE O/P EST LOW 20 MIN: CPT | Performed by: OBSTETRICS & GYNECOLOGY

## 2020-07-15 NOTE — PROGRESS NOTES
pts here with a week h/o vag d/c like a yeast infection with irritation. Af, vss  Ext- no lesions  Meatus- no lesions  Bladder- good support  Vag- scant d/c  cx- no lesions  Affirm, mycoplasma  Assess:  Vag d/c  Plan:  Call with results. F/u annual gyn exam.  Recommend womens probiotic.

## 2020-07-17 RX ORDER — METRONIDAZOLE 500 MG/1
500 TABLET ORAL 2 TIMES DAILY
Qty: 14 TABLET | Refills: 0 | Status: SHIPPED | OUTPATIENT
Start: 2020-07-17 | End: 2020-07-24

## 2020-07-20 LAB
FINAL REPORT: NORMAL
PRELIMINARY: NORMAL

## 2020-07-31 ENCOUNTER — TELEPHONE (OUTPATIENT)
Dept: GYNECOLOGY | Age: 45
End: 2020-07-31

## 2020-07-31 RX ORDER — FLUCONAZOLE 150 MG/1
150 TABLET ORAL ONCE
Qty: 1 TABLET | Refills: 0 | Status: SHIPPED | OUTPATIENT
Start: 2020-07-31 | End: 2020-07-31

## 2020-07-31 NOTE — TELEPHONE ENCOUNTER
Thinks has yeast infection again, itching, white discharge; can dr morrison call in script for her-send to [facility]

## 2020-08-04 ENCOUNTER — OFFICE VISIT (OUTPATIENT)
Dept: GYNECOLOGY | Age: 45
End: 2020-08-04
Payer: COMMERCIAL

## 2020-08-04 VITALS
BODY MASS INDEX: 44.2 KG/M2 | HEIGHT: 67 IN | RESPIRATION RATE: 16 BRPM | OXYGEN SATURATION: 99 % | DIASTOLIC BLOOD PRESSURE: 83 MMHG | HEART RATE: 94 BPM | WEIGHT: 281.6 LBS | TEMPERATURE: 97.1 F | SYSTOLIC BLOOD PRESSURE: 130 MMHG

## 2020-08-04 PROCEDURE — 99213 OFFICE O/P EST LOW 20 MIN: CPT | Performed by: OBSTETRICS & GYNECOLOGY

## 2020-08-04 NOTE — PROGRESS NOTES
Pts here for recurrent vag d/c with itching and min odor. Prev h/o BV not yeast.  Af, vss  Ext- no lesions  Bladder-good support  Meatus- no lesions  Vag- min d/c  cx- no lesions  Affirm, mycoplasma  Assess:  Vag d/c  Plan:  Call with results.   F/u annual gyn exam.

## 2020-08-05 RX ORDER — PANTOPRAZOLE SODIUM 40 MG/1
TABLET, DELAYED RELEASE ORAL
Qty: 180 TABLET | Refills: 0 | Status: SHIPPED | OUTPATIENT
Start: 2020-08-05 | End: 2020-09-01

## 2020-08-05 RX ORDER — METRONIDAZOLE 500 MG/1
500 TABLET ORAL 2 TIMES DAILY
Qty: 14 TABLET | Refills: 0 | Status: SHIPPED | OUTPATIENT
Start: 2020-08-05 | End: 2020-08-12

## 2020-08-05 RX ORDER — FLUCONAZOLE 150 MG/1
TABLET ORAL
Qty: 2 TABLET | Refills: 0 | Status: SHIPPED | OUTPATIENT
Start: 2020-08-05 | End: 2021-03-17

## 2020-08-05 NOTE — TELEPHONE ENCOUNTER
Gave result pt stated would like diflucan also she get yeast after antibotic and would like that forwarded as well so pended flagyl 500 mg po bid x 7 days dispense 14 with no refills , diflucan x 2 please sign if ok

## 2020-08-09 LAB
FINAL REPORT: NORMAL
PRELIMINARY: NORMAL

## 2020-08-26 RX ORDER — DULAGLUTIDE 1.5 MG/.5ML
INJECTION, SOLUTION SUBCUTANEOUS
Qty: 2 ML | Refills: 0 | Status: SHIPPED | OUTPATIENT
Start: 2020-08-26 | End: 2020-09-28

## 2020-09-01 ENCOUNTER — TELEPHONE (OUTPATIENT)
Dept: GYNECOLOGY | Age: 45
End: 2020-09-01

## 2020-09-01 RX ORDER — LOSARTAN POTASSIUM 25 MG/1
TABLET ORAL
Qty: 90 TABLET | Refills: 1 | Status: SHIPPED | OUTPATIENT
Start: 2020-09-01 | End: 2020-12-21

## 2020-09-01 RX ORDER — CLINDAMYCIN HYDROCHLORIDE 300 MG/1
300 CAPSULE ORAL 3 TIMES DAILY
Qty: 30 CAPSULE | Refills: 0 | Status: SHIPPED | OUTPATIENT
Start: 2020-09-01 | End: 2020-09-11

## 2020-09-01 RX ORDER — METRONIDAZOLE 500 MG/1
500 TABLET ORAL 2 TIMES DAILY
Qty: 14 TABLET | Refills: 0 | Status: CANCELLED | OUTPATIENT
Start: 2020-09-01 | End: 2020-09-08

## 2020-09-01 RX ORDER — PANTOPRAZOLE SODIUM 40 MG/1
TABLET, DELAYED RELEASE ORAL
Qty: 180 TABLET | Refills: 0 | Status: SHIPPED | OUTPATIENT
Start: 2020-09-01 | End: 2022-07-22 | Stop reason: SDUPTHER

## 2020-09-03 ENCOUNTER — PATIENT MESSAGE (OUTPATIENT)
Dept: GYNECOLOGY | Age: 45
End: 2020-09-03

## 2020-09-03 NOTE — TELEPHONE ENCOUNTER
From: Cynthia Aragon  To: Diomedes Damian MD  Sent: 2/9/9471 1:06 PM EDT  Subject: Prescription Question    I started the antibiotic Tuesday evening and woke up today with some dizziness. My blood pressure and sugar are normal. I am prone to vertigo but have managed it successfully with the Epley maneuver. It's not severe right now; I can still move around and am still working from home with little need to go anywhere. If it's temporary from the antibiotic and doesn't get worse, I can stick this out. But the rx paperwork says to tell you if this happens. I may not have ever taken clindamycin before, but I know being allergic to sulfa limits my options.

## 2020-09-21 ENCOUNTER — OFFICE VISIT (OUTPATIENT)
Dept: GYNECOLOGY | Age: 45
End: 2020-09-21
Payer: COMMERCIAL

## 2020-09-21 VITALS
RESPIRATION RATE: 16 BRPM | HEART RATE: 102 BPM | WEIGHT: 284.4 LBS | BODY MASS INDEX: 44.64 KG/M2 | TEMPERATURE: 97.3 F | DIASTOLIC BLOOD PRESSURE: 69 MMHG | SYSTOLIC BLOOD PRESSURE: 154 MMHG | OXYGEN SATURATION: 100 % | HEIGHT: 67 IN

## 2020-09-21 PROCEDURE — 99213 OFFICE O/P EST LOW 20 MIN: CPT | Performed by: OBSTETRICS & GYNECOLOGY

## 2020-09-22 ENCOUNTER — PROCEDURE VISIT (OUTPATIENT)
Dept: GYNECOLOGY | Age: 45
End: 2020-09-22

## 2020-09-22 RX ORDER — TINIDAZOLE 500 MG/1
2 TABLET ORAL ONCE
Qty: 4 TABLET | Refills: 0 | Status: SHIPPED | OUTPATIENT
Start: 2020-09-22 | End: 2020-09-22

## 2020-09-28 RX ORDER — DULAGLUTIDE 1.5 MG/.5ML
INJECTION, SOLUTION SUBCUTANEOUS
Qty: 2 ML | Refills: 0 | Status: SHIPPED | OUTPATIENT
Start: 2020-09-28 | End: 2020-10-23

## 2020-09-28 RX ORDER — PANTOPRAZOLE SODIUM 40 MG/1
TABLET, DELAYED RELEASE ORAL
Qty: 180 TABLET | Refills: 0 | OUTPATIENT
Start: 2020-09-28

## 2020-09-30 RX ORDER — TINIDAZOLE 500 MG/1
2 TABLET ORAL ONCE
Qty: 4 TABLET | Refills: 0 | Status: SHIPPED | OUTPATIENT
Start: 2020-09-30 | End: 2020-09-30

## 2020-10-12 ENCOUNTER — PATIENT MESSAGE (OUTPATIENT)
Dept: GYNECOLOGY | Age: 45
End: 2020-10-12

## 2020-10-13 RX ORDER — CLINDAMYCIN PHOSPHATE 20 MG/G
1 CREAM VAGINAL NIGHTLY
Qty: 1 TUBE | Refills: 0 | Status: SHIPPED | OUTPATIENT
Start: 2020-10-13 | End: 2020-11-16

## 2020-10-13 NOTE — TELEPHONE ENCOUNTER
Spoke with pt and again she wanted me to send to both.  If you could help answer her questions and dr morrison could look at it when he gets back so he is aware thanks
your message. I hope that you are feeling better now that you are on the medicine. If you are still feeling dizzy please call the office and I can try you on a vaginal form of clindamycin. Unfortunately, I'm not sure if your insurance covers it so you may need to pay out of pocket. Thanks! Dr Lesa Brady      ----- Message -----   Lenore Capps   Sent:9/3/2020 1:06 PM EDT   To:BRIAN Merritt MD   Subject:Prescription Question    I started the antibiotic Tuesday evening and woke up today with some dizziness. My blood pressure and sugar are normal. I am prone to vertigo but have managed it successfully with the Epley maneuver. It's not severe right now; I can still move around and am still working from home with little need to go anywhere. If it's temporary from the antibiotic and doesn't get worse, I can stick this out. But the rx paperwork says to tell you if this happens. I may not have ever taken clindamycin before, but I know being allergic to sulfa limits my options.

## 2020-10-15 ENCOUNTER — OFFICE VISIT (OUTPATIENT)
Dept: FAMILY MEDICINE CLINIC | Age: 45
End: 2020-10-15
Payer: COMMERCIAL

## 2020-10-15 VITALS
BODY MASS INDEX: 44.39 KG/M2 | SYSTOLIC BLOOD PRESSURE: 116 MMHG | DIASTOLIC BLOOD PRESSURE: 74 MMHG | HEART RATE: 109 BPM | OXYGEN SATURATION: 98 % | WEIGHT: 282.8 LBS | TEMPERATURE: 96.9 F | HEIGHT: 67 IN | RESPIRATION RATE: 14 BRPM

## 2020-10-15 PROBLEM — R07.9 CHEST PAIN: Status: RESOLVED | Noted: 2020-02-22 | Resolved: 2020-10-15

## 2020-10-15 PROBLEM — Z78.9 FALSE POSITIVE SEROLOGY FOR HIV: Status: RESOLVED | Noted: 2017-07-30 | Resolved: 2020-10-15

## 2020-10-15 PROBLEM — N84.1 ENDOCERVICAL POLYP: Status: RESOLVED | Noted: 2017-07-26 | Resolved: 2020-10-15

## 2020-10-15 PROCEDURE — 90686 IIV4 VACC NO PRSV 0.5 ML IM: CPT | Performed by: FAMILY MEDICINE

## 2020-10-15 PROCEDURE — 90471 IMMUNIZATION ADMIN: CPT | Performed by: FAMILY MEDICINE

## 2020-10-15 PROCEDURE — 99214 OFFICE O/P EST MOD 30 MIN: CPT | Performed by: FAMILY MEDICINE

## 2020-10-15 PROCEDURE — 83036 HEMOGLOBIN GLYCOSYLATED A1C: CPT | Performed by: FAMILY MEDICINE

## 2020-10-15 NOTE — PROGRESS NOTES
Vaccine Information Sheet, \"Influenza - Inactivated\"  given to John Chery, or parent/legal guardian of  John Chery and verbalized understanding. Patient responses:    Have you ever had a reaction to a flu vaccine? No  Are you able to eat eggs without adverse effects? Yes  Do you have any current illness? No  Have you ever had Guillian Rugby Syndrome? No    Flu vaccine given per order. Please see immunization tab.

## 2020-10-15 NOTE — PATIENT INSTRUCTIONS
INSTRUCTIONS  NEXT APPOINTMENT: Please schedule annual complete physical (30 minutes) in 3 months. · PLEASE TAKE THIS FORM TO CHECK-OUT WINDOW TO SCHEDULE NEXT VISIT.    Patient Education

## 2020-10-15 NOTE — PROGRESS NOTES
DIABETES MELLITUS FOLOW-UP  Subjective:      Chief Complaint   Patient presents with    Diabetes     Jose Burris is an 45 y.o. female who presents for follow up of following chronic problems:  1. Type 2 diabetes mellitus with diabetic nephropathy, unspecified whether long term insulin use ([facility] [address]    2. Mild intermittent asthma without complication    3. Obesity, Class III, BMI 40-49.9 (morbid obesity) ([address]    4. Hyperlipidemia LDL goal <100    5. Dysfunctional uterine bleeding    6. Needs flu shot      Complaints: none  Seeing GI for GERD    · Patent follows diabetic diet? Yes  · Exercise: yoga three times a week and housework  · Taking medicines daily as directed? Yes  · Is the patient reporting any side effects of medications? No  · Patient checks bottom of feet daily? Yes  · Tobacco history updated:  reports that she has never smoked. She has never used smokeless tobacco.      Review of Systems   General ROS: fever? No,   Night sweats? No  Ophthalmic ROS: change in vision? No  Endocrine ROS: fatigue? No  Unexpected weight changes? No  Respiratory ROS: Shortness of breath? No  Cardiovascular ROS: chest pain? No  Gastrointestinal ROS: abdominal pain? No  Change in stools? No  Genito-Urinary ROS: painful urination? No  Trouble urinating? No  Neurological ROS: TIA or stroke symptoms? No  Numbness/tingling? No  Dermatological ROS: rash or sores on feet? No  Changes in skin spots?     No     * Chief complaint, HPI and History provided by the medical assistant has been reviewed and verified  by provider Rogue Holstein MD    LAST LABS  Lab Results   Component Value Date    LABA1C 5.7 02/23/2020    LABA1C 6.0 01/08/2020    LABA1C 6.0 09/19/2019     LDL Calculated   Date Value Ref Range Status   02/23/2020 61 <100 mg/dL Final     Lab Results   Component Value Date    HDL 63 (H) 02/23/2020     Lab Results   Component Value Date    TRIG 121 02/23/2020     Lab Results   Component Value Date GLUCOSE 122 (H) 02/23/2020     Lab Results   Component Value Date     02/23/2020    K 3.8 02/23/2020    CREATININE 0.6 02/23/2020     Lab Results   Component Value Date    WBC 10.4 02/23/2020    HGB 11.8 (L) 02/23/2020     02/23/2020     Lab Results   Component Value Date    ALT 8 (L) 06/18/2019    AST 10 (L) 06/18/2019    ALKPHOS 95 06/18/2019    BILITOT 0.3 06/18/2019     TSH (uIU/mL)   Date Value   09/12/2018 2.16     HISTORY:  Patient's medications, allergies, past medical, and social histories were reviewed and updated as appropriate. CHART REVIEW  Health Maintenance Due   Topic Date Due    Flu vaccine (1) 09/01/2020    Diabetic retinal exam  10/08/2020     Social History     Tobacco Use    Smoking status: Never Smoker    Smokeless tobacco: Never Used    Tobacco comment: advised not to start   Substance Use Topics    Alcohol use: Yes     Alcohol/week: 1.0 standard drinks     Types: 1 Cans of beer per week    Drug use: No      The 10-year ASCVD risk score (Db Awad, et al., 2013) is: 0.5%    Values used to calculate the score:      Age: 45 years      Sex: Female      Is Non- : No      Diabetic: Yes      Tobacco smoker: No      Systolic Blood Pressure: 872 mmHg      Is BP treated: No      HDL Cholesterol: 63 mg/dL      Total Cholesterol: 148 mg/dL  Prior to Visit Medications    Medication Sig Taking?  Authorizing Provider   clindamycin (CLEOCIN) 2 % vaginal cream Place 1 applicator vaginally nightly for 7 days Yes Colette Tamayo MD   Dulaglutide (TRULICITY) 1.5 ST/0.4FO SOPN ADMINISTER 0.5 ML UNDER THE SKIN 1 TIME A WEEK Yes Sd Freedman MD   sertraline (ZOLOFT) 50 MG tablet TAKE 1 TABLET BY MOUTH DAILY Yes Sd Freedman MD   losartan (COZAAR) 25 MG tablet TAKE 1 TABLET BY MOUTH DAILY Yes Lauren Gerard MD   pantoprazole (PROTONIX) 40 MG tablet TAKE 1 TABLET BY MOUTH TWICE DAILY Yes OC Davies - CNP   fluconazole (DIFLUCAN) 150 MG tablet Take one tablet 1st day,  Repeat after 24-48 hours if symptoms persist Yes Kana Soler MD   ISIBLOOM 3.86-40 MG-MCG per tablet TAKE 1 TABLET BY MOUTH DAILY( ALTERNATE FOR ENSKYCE) Yes Nydia Ricks MD   montelukast (SINGULAIR) 10 MG tablet TAKE 1 TABLET BY MOUTH EVERY EVENING Yes Nydia Ricks MD   Clobetasol Propionate 0.05 % LIQD Apply 1 applicator topically daily Yes Chase Wolf, APRN - CNP   azelastine (ASTELIN) 0.1 % nasal spray 1 spray by Nasal route 2 times daily Use in each nostril as directed Yes Historical Provider, MD   Cannabidiol 100 MG/ML SOLN PRN anxiety Yes Nydia Ricks MD   fluticasone propionate (FLOVENT DISKUS) 100 MCG/BLIST AEPB inhaler Inhale 2 puffs into the lungs daily Yes Nydia Ricks MD   Biotin 5000 MCG CAPS Take 5,000 mcg by mouth Yes Historical Provider, MD   metFORMIN (GLUCOPHAGE-XR) 500 MG extended release tablet TAKE 4 TABLETS BY MOUTH DAILY WITH BREAKFAST Yes Nydia Ricks MD   triamcinolone (ARISTOCORT) 0.5 % ointment Apply topically 2 times daily. Yes Nydia Ricks MD   aspirin EC 81 MG EC tablet Take 1 tablet by mouth daily Yes Nydia Ricks MD   blood glucose test strips (ASCENSIA AUTODISC VI;ONE TOUCH ULTRA TEST VI) strip twice a day Yes Nydia Ricks MD   Lancet Devices (PRODIGY LANCING DEVICE) MISC as needed Yes Nydia iRcks MD   Blood Glucose Monitoring Suppl (PRODIGY POCKET BLOOD GLUCOSE) w/Device KIT as needed Yes Nydia Ricks MD   pimecrolimus (ELIDEL) 1 % cream Apply topically 2 times daily. Yes Nydia Ricks MD   dicyclomine (BENTYL) 20 MG tablet TAKE 1 TABLET BY MOUTH THREE TIMES DAILY AS NEEDED FOR CRAMPING Yes Nydia Ricks MD   clotrimazole-betamethasone (LOTRISONE) 1-0.05 % cream Apply topically 2 times daily.  Yes Nydia Ricks MD   albuterol sulfate  (90 BASE) MCG/ACT inhaler Inhale 2 puffs into the lungs every 4 hours as needed for Wheezing May substitute for insurance preferred (Ventolin, Proventil, ProAir) Yes Nydia Ricks MD   fluticasone QUADV, 3 YRS AND OLDER, IM PF, PREFILL SYR OR SDV, 0.5ML (AFLURIA QUADV, PF)   Stable. Continue current Tx plan. Any changes marked below. INSTRUCTIONS  NEXT APPOINTMENT: Please schedule annual complete physical (30 minutes) in 3 months. · PLEASE TAKE THIS FORM TO CHECK-OUT WINDOW TO SCHEDULE NEXT VISIT.

## 2020-10-23 RX ORDER — DULAGLUTIDE 1.5 MG/.5ML
INJECTION, SOLUTION SUBCUTANEOUS
Qty: 6 ML | Refills: 0 | Status: SHIPPED | OUTPATIENT
Start: 2020-10-23 | End: 2021-01-25 | Stop reason: SDUPTHER

## 2020-11-03 ENCOUNTER — PROCEDURE VISIT (OUTPATIENT)
Dept: GYNECOLOGY | Age: 45
End: 2020-11-03

## 2020-11-03 RX ORDER — CLINDAMYCIN HYDROCHLORIDE 300 MG/1
300 CAPSULE ORAL 2 TIMES DAILY
Qty: 14 CAPSULE | Refills: 0 | Status: SHIPPED | OUTPATIENT
Start: 2020-11-03 | End: 2020-11-10

## 2020-11-10 ENCOUNTER — VIRTUAL VISIT (OUTPATIENT)
Dept: PULMONOLOGY | Age: 45
End: 2020-11-10
Payer: COMMERCIAL

## 2020-11-10 PROCEDURE — 99214 OFFICE O/P EST MOD 30 MIN: CPT | Performed by: NURSE PRACTITIONER

## 2020-11-10 ASSESSMENT — SLEEP AND FATIGUE QUESTIONNAIRES
HOW LIKELY ARE YOU TO NOD OFF OR FALL ASLEEP WHEN YOU ARE A PASSENGER IN A CAR FOR AN HOUR WITHOUT A BREAK: 0
HOW LIKELY ARE YOU TO NOD OFF OR FALL ASLEEP WHILE WATCHING TV: 0
HOW LIKELY ARE YOU TO NOD OFF OR FALL ASLEEP WHILE LYING DOWN TO REST IN THE AFTERNOON WHEN CIRCUMSTANCES PERMIT: 1
ESS TOTAL SCORE: 2
HOW LIKELY ARE YOU TO NOD OFF OR FALL ASLEEP WHILE SITTING QUIETLY AFTER LUNCH WITHOUT ALCOHOL: 0
HOW LIKELY ARE YOU TO NOD OFF OR FALL ASLEEP WHILE SITTING AND TALKING TO SOMEONE: 0
HOW LIKELY ARE YOU TO NOD OFF OR FALL ASLEEP WHILE SITTING INACTIVE IN A PUBLIC PLACE: 0
HOW LIKELY ARE YOU TO NOD OFF OR FALL ASLEEP WHILE SITTING AND READING: 1
HOW LIKELY ARE YOU TO NOD OFF OR FALL ASLEEP IN A CAR, WHILE STOPPED FOR A FEW MINUTES IN TRAFFIC: 0

## 2020-11-10 NOTE — PROGRESS NOTES
Stacie Figueroa MD, FAASM, [facility]  Karla Robb, MSN, RN, CNP     [address]  SLEEP MEDICINE  [address] [address]  Dept: [phone]  Dept Fax: : [phone]      [address] [facility] SLEEP MEDICINE  [address] [place] [id number] [phone]    Subjective:     Patient ID: Ry Arthur is a 39 y.o. female. Chief Complaint   Patient presents with    Sleep Apnea       HPI:      Sleep Medicine Video Visit    Pursuant to the emergency declaration under the [facility] waiver authority and the [employer] Act this Telephone Visit was insisted, with patient's consent, to reduce the patient's risk of exposure to COVID-19 and provide continuity of care for an established patient. Services were provided through a synchronous discussion over a telephone and/or Video chat to substitute for in-person clinic visit, and coded as such. While patient is at home. Machine Modem/Download Info:  Compliance (hours/night): 7.25 hrs/night  Download AHI (/hour): 1 /HR  Average CPAP Pressure : 11.4 cmH2O           APAP - Settings  Pressure Min: 9 cmH2O  Pressure Max: 17 cmH2O                   PAP Mask  Mask Type: Full Face mask     [place] - Total score: 2    Follow-up :     Last Visit : October 2018      Patient reports the listed chronic Co-morbidities: DM, GERD, Allergies, Asthma, Obesity    are well controlled and stable at this time.      Subjective Health Changes: None      Over Night Oximetry: [] Yes  [] No  [x] NA [] WNL   Using O2: [] Yes  [] No  [x] NA   Patient is compliant with the machine  [x] Yes  [] No   Feeling rested when using the machine   [x] Yes  [] No     Pressure is comfortable with inspiration and expiration  [x] Yes  [] No     Noticed changes in pressure   [] Yes  [] No  [x] NA Mask is fitting well  [x] Yes  [] No   Noting Mask Air Leak  [] Yes  [x] No   Having painful Aerophagia  [] Yes  [x] No   Nocturia   0  per night. Having  HA upon waking  [] Yes  [x] No   Dry mouth upon waking   Dry Nose  Dry Eyes  [] Yes  [x] No   Congestion upon waking   [] Yes  [x] No    Nose Bleeds  [] Yes  [x] No   Using Sleep Aides    [x] NA   Understands how to change humidification and/or tubing temperature for comfort while at home  [x] Yes  [] No     Difficulties falling asleep  [] Yes  [x] No   Difficulties staying asleep  [] Yes  [x] No   Approximate time to bed  11pm-1am   Approximate wake time  7:30am   Taking Naps  no   If taking naps usual length    [x] NA   If taking naps using the machine  [] Yes  [] No  [x] NA [] With and With out    Drowsy when driving  [] Yes  [x] No     Does patient carry a DOT/CDL  [] Yes  [x] No     Does patient carry FAA/[employer] License   [] Yes  [x] No      Any concerns noted with the machine at this time  [] Yes  [x] No        Diagnosis Orders   1. Obstructive apnea- CPAP     2. Type 2 diabetes mellitus with diabetic nephropathy, unspecified whether long term insulin use (HCC)     3. Obesity, Class III, BMI 40-49.9 (morbid obesity) ([facility] [address]     4. Gastroesophageal reflux disease without esophagitis     5. Allergic rhinitis, unspecified seasonality, unspecified trigger     6. Mild intermittent asthma without complication         The chronic medical conditions listed are directly related to the primary diagnosis listed above. The management of the primary diagnosis affects the secondary diagnosis and vice versa. Assessment/Plan:     Type 2 diabetes mellitus with diabetic nephropathy (HCC)  Chronic- Stable. Cont meds per PCP and other physicians. Obesity, Class III, BMI 40-49.9 (morbid obesity) ([facility])  Chronic-Stable. Encouraged her to work on weight loss through diet and exercise. GERD (gastroesophageal reflux disease)  Chronic- Stable.   Cont meds per PCP 3pm.    -Encouraged her to work on weight loss through diet and exercise. -  Patient able to access video feed. Visit completed via video chat communications. 25 min spent with patient. -F/U: 12 month. No orders of the defined types were placed in this encounter. No orders of the defined types were placed in this encounter. No orders of the defined types were placed in this encounter.       Lois Mccarthy, MAINOR, RN, CNP

## 2020-11-10 NOTE — ASSESSMENT & PLAN NOTE
Reviewed compliance download with pt. Supplies and parts as needed for her machine. These are medically necessary. Continue medications per her PCP and other physicians. Limit caffeine use after 3pm.  Encouraged her to work on weight loss through diet and exercise. Diagnoses of Type 2 diabetes mellitus with diabetic nephropathy, unspecified whether long term insulin use ([facility] [address] Obesity, Class III, BMI 40-49.9 (morbid obesity) [facility] Gastroesophageal reflux disease without esophagitis, Allergic rhinitis, unspecified seasonality, unspecified trigger, and Mild intermittent asthma without complication were pertinent to this visit. The chronic medical conditions listed are directly related to the primary diagnosis listed above. The management of the primary diagnosis affects the secondary diagnosis and vice versa.

## 2020-11-16 ENCOUNTER — OFFICE VISIT (OUTPATIENT)
Dept: GYNECOLOGY | Age: 45
End: 2020-11-16
Payer: COMMERCIAL

## 2020-11-16 VITALS
WEIGHT: 283 LBS | BODY MASS INDEX: 44.42 KG/M2 | DIASTOLIC BLOOD PRESSURE: 83 MMHG | TEMPERATURE: 97.2 F | SYSTOLIC BLOOD PRESSURE: 117 MMHG | HEIGHT: 67 IN | HEART RATE: 108 BPM

## 2020-11-16 PROCEDURE — 99213 OFFICE O/P EST LOW 20 MIN: CPT | Performed by: OBSTETRICS & GYNECOLOGY

## 2020-11-16 RX ORDER — METFORMIN HYDROCHLORIDE 500 MG/1
TABLET, EXTENDED RELEASE ORAL
Qty: 360 TABLET | Refills: 0 | Status: SHIPPED | OUTPATIENT
Start: 2020-11-16 | End: 2020-12-14

## 2020-11-16 RX ORDER — CLINDAMYCIN PHOSPHATE 20 MG/G
1 CREAM VAGINAL NIGHTLY
Qty: 2 TUBE | Refills: 2 | Status: SHIPPED | OUTPATIENT
Start: 2020-11-16 | End: 2020-11-23

## 2020-11-20 LAB
C TRACH DNA GENITAL QL NAA+PROBE: NEGATIVE
FINAL REPORT: NORMAL
N. GONORRHOEAE DNA: NEGATIVE
PRELIMINARY: NORMAL

## 2020-12-14 RX ORDER — METFORMIN HYDROCHLORIDE 500 MG/1
TABLET, EXTENDED RELEASE ORAL
Qty: 360 TABLET | Refills: 0 | Status: SHIPPED | OUTPATIENT
Start: 2020-12-14 | End: 2021-02-15

## 2021-01-18 ENCOUNTER — PATIENT MESSAGE (OUTPATIENT)
Dept: FAMILY MEDICINE CLINIC | Age: 46
End: 2021-01-18

## 2021-01-18 DIAGNOSIS — L30.4 INTERTRIGO: ICD-10-CM

## 2021-01-18 NOTE — TELEPHONE ENCOUNTER
From: Leslie Kirk  To: Eusebio Griggs MD  Sent: 1/18/2021 4:55 PM EST  Subject: Prescription Question    Hi Dr. Ryley Eastman, 3 yrs ago (exactly!) you prescribed 60 gm of clotrimazole-bethamethasone cream for my occasional rashes in skin folds. I finally used it up. May I have another rx for that or something comparable?

## 2021-01-19 RX ORDER — CLOTRIMAZOLE AND BETAMETHASONE DIPROPIONATE 10; .64 MG/G; MG/G
CREAM TOPICAL
Qty: 60 G | Refills: 0 | Status: SHIPPED | OUTPATIENT
Start: 2021-01-19

## 2021-01-25 ENCOUNTER — PATIENT MESSAGE (OUTPATIENT)
Dept: FAMILY MEDICINE CLINIC | Age: 46
End: 2021-01-25

## 2021-01-25 DIAGNOSIS — E11.21 TYPE 2 DIABETES MELLITUS WITH DIABETIC NEPHROPATHY, UNSPECIFIED WHETHER LONG TERM INSULIN USE (HCC): ICD-10-CM

## 2021-01-25 RX ORDER — DULAGLUTIDE 1.5 MG/.5ML
INJECTION, SOLUTION SUBCUTANEOUS
Qty: 6 ML | Refills: 0 | Status: SHIPPED | OUTPATIENT
Start: 2021-01-25 | End: 2021-04-15

## 2021-01-25 NOTE — TELEPHONE ENCOUNTER
From: Jacobo Tomlin  To: Jazmyne Portillo MD  Sent: 1/25/2021 11:09 AM EST  Subject: Prescription Question    Hi Dr. Jayce Moy, thank you for the rx cream I requested. I now have a refill request for the Trulicity that Jason has said is pending your approval for more than a few days. I'll need one of those by Sunday. May I have that refilled?

## 2021-02-13 DIAGNOSIS — E11.21 TYPE 2 DIABETES MELLITUS WITH DIABETIC NEPHROPATHY, WITHOUT LONG-TERM CURRENT USE OF INSULIN (HCC): ICD-10-CM

## 2021-02-13 DIAGNOSIS — K21.9 GASTROESOPHAGEAL REFLUX DISEASE WITHOUT ESOPHAGITIS: Chronic | ICD-10-CM

## 2021-02-15 RX ORDER — METFORMIN HYDROCHLORIDE 500 MG/1
TABLET, EXTENDED RELEASE ORAL
Qty: 360 TABLET | Refills: 0 | Status: SHIPPED | OUTPATIENT
Start: 2021-02-15 | End: 2021-03-22 | Stop reason: SDUPTHER

## 2021-03-16 ENCOUNTER — IMMUNIZATION (OUTPATIENT)
Dept: FAMILY MEDICINE CLINIC | Age: 46
End: 2021-03-16
Payer: COMMERCIAL

## 2021-03-17 ENCOUNTER — OFFICE VISIT (OUTPATIENT)
Dept: FAMILY MEDICINE CLINIC | Age: 46
End: 2021-03-17
Payer: COMMERCIAL

## 2021-03-17 VITALS
HEIGHT: 67 IN | DIASTOLIC BLOOD PRESSURE: 84 MMHG | OXYGEN SATURATION: 98 % | BODY MASS INDEX: 45.99 KG/M2 | TEMPERATURE: 97.5 F | RESPIRATION RATE: 14 BRPM | SYSTOLIC BLOOD PRESSURE: 128 MMHG | HEART RATE: 100 BPM | WEIGHT: 293 LBS

## 2021-03-17 DIAGNOSIS — E66.01 OBESITY, CLASS III, BMI 40-49.9 (MORBID OBESITY) (HCC): Chronic | ICD-10-CM

## 2021-03-17 DIAGNOSIS — F43.22 ADJUSTMENT DISORDER WITH ANXIOUS MOOD: ICD-10-CM

## 2021-03-17 DIAGNOSIS — E11.21 TYPE 2 DIABETES MELLITUS WITH DIABETIC NEPHROPATHY, WITHOUT LONG-TERM CURRENT USE OF INSULIN (HCC): Chronic | ICD-10-CM

## 2021-03-17 DIAGNOSIS — D64.9 ANEMIA, UNSPECIFIED TYPE: ICD-10-CM

## 2021-03-17 DIAGNOSIS — G47.33 OBSTRUCTIVE APNEA: Chronic | ICD-10-CM

## 2021-03-17 DIAGNOSIS — J45.20 MILD INTERMITTENT ASTHMA WITHOUT COMPLICATION: Chronic | ICD-10-CM

## 2021-03-17 DIAGNOSIS — Z00.00 ANNUAL PHYSICAL EXAM: Primary | ICD-10-CM

## 2021-03-17 DIAGNOSIS — E78.5 HYPERLIPIDEMIA LDL GOAL <100: ICD-10-CM

## 2021-03-17 PROCEDURE — 99396 PREV VISIT EST AGE 40-64: CPT | Performed by: FAMILY MEDICINE

## 2021-03-17 PROCEDURE — 0001A COVID-19, PFIZER VACCINE 30MCG/0.3ML DOSE: CPT | Performed by: FAMILY MEDICINE

## 2021-03-17 PROCEDURE — 91300 COVID-19, PFIZER VACCINE 30MCG/0.3ML DOSE: CPT | Performed by: FAMILY MEDICINE

## 2021-03-17 ASSESSMENT — PATIENT HEALTH QUESTIONNAIRE - PHQ9: SUM OF ALL RESPONSES TO PHQ QUESTIONS 1-9: 0

## 2021-03-17 NOTE — PATIENT INSTRUCTIONS
INSTRUCTIONS  · NEXT APPOINTMENT: Please schedule check-up in 6 months. · PLEASE TAKE THIS FORM TO CHECK-OUT WINDOW TO SCHEDULE NEXT VISIT. PLEASE GET FASTING BLOODWORK DRAWN SOON. Lab is on first floor in [address]. Hours Monday to Friday 7 AM to 5 PM.   · Watch tremor. If becomes more persistent, may need studies or Neurology. · Try taking zoloft at bedtime. Patient Education       TREMOR    What is a tremor? A tremor is twitching or shaking of a body part that you can't control. Most tremors affect the hand, but they can also happen in the arm, head, leg, and even voice. They are more common in middle-aged and older adults, but they can happen at any age. Some tremors are barely noticeable, and some are more severe and may make it hard to write or hold things. There are different types of tremors. A rest tremor happens when the body is relaxed. An action tremor happens while you are moving a part of your body. A postural tremor happens when your arm or leg is held against gravity (for example, holding your arms outstretched). A kinetic or intention tremor happens when you are trying to do a task (for example, drawing or pouring a drink). It is important for you to find out what type of tremor you have. What causes it? Anyone can have a tremor in certain situations. For example, you might have a tremor if you are very tired, nervous, drink caffeine, or are doing certain movements (for example, threading a needle). Most tremors happen in people who are otherwise healthy, but a tremor can sometimes be a sign of a health problem. Certain medicines, including corticosteroids, amphetamines, and psychiatric drugs, can cause tremors. Anxiety and other psychological problems, an overactive thyroid, alcohol abuse or withdrawal, stroke or head injuries, and Claudio disease (a rare liver disease) can also cause tremors. Parkinson disease causes a resting tremor, and is most common in older adults.  Some tremors run in families, and some have no known cause. How do I find out what is causing it? Your doctor will ask you about your tremor, your use of alcohol and medicines, and your family health history. He or she may examine you to assess your strength, sensory function (for example, sight, smell, touch), reflexes, and balance. Doctors can usually figure out what is causing your tremor by asking questions and examining you. Sometimes, a test for blood sugar level, kidney function, or liver function is needed. Rarely, a scan of your head may also be needed. How is it treated? Most tremors can't be cured, but they can be treated so they are less bothersome. The type of treatment depends on the cause of your tremor. Medicine may help with essential tremor, which is a tremor that sometimes runs in families and is worse when you move. Medicine may also help with tremors from Parkinson disease, thyroid problems, and Claudio disease. Your doctor may also suggest that you stop taking certain medicines or using other things that can trigger tremors (for example, alcohol, caffeine). Surgery is an option for severe tremors that don't respond to medicine. Some general treatments for all types of tremor include physical or occupational therapy, assistive devices (for example, utensils with large handles, button hooks, straws), and holding your arms close to the body.

## 2021-03-17 NOTE — PROGRESS NOTES
PHYSICAL-VISIT NOTE   Subjective:     Chief Complaint   Patient presents with    Annual Exam       Dorian Allen is a 39 y.o. female who presents for annual testing/preventive review and check-up of medical problems listed below:  1. Annual physical exam    2. Type 2 diabetes mellitus with diabetic nephropathy, without long-term current use of insulin ([facility] [address]    3. Hyperlipidemia LDL goal <100    4. Mild intermittent asthma without complication    5. Anemia, unspecified type    6. Obesity, Class III, BMI 40-49.9 (morbid obesity) ([facility] [address]    7. Obstructive apnea- CPAP    8. Adjustment disorder with anxious mood        Complaints: fatigue and sore arm after 1st COVID vaccine yesterday. · Using incline for GERD at night. Back and arm a little sore. · Tremor in left thumb and index, sometimes entire hand. Fast twitching at rest. May last hours. Not daily. Not even every week. Denies pain, tingle. R handed. X 4 months  · Zoloft working well but some restless legs with it. · Not needed rescue inhaler in long time. Uses preventive before exertion    ROS  See scanned \"Annual Adult Health Checklist\". Pertinent positives addressed above. HISTORY:  Patient's medications, allergies, past medical, and social histories were reviewed and updated as appropriate.      CHART REVIEW  Health Maintenance   Topic Date Due    Diabetic foot exam  01/08/2021    A1C test (Diabetic or Prediabetic)  02/23/2021    Potassium monitoring  02/23/2021    Creatinine monitoring  02/23/2021    Lipid screen  02/23/2021    COVID-19 Vaccine (2 - Pfizer 2-dose series) 04/07/2021    Diabetic retinal exam  12/18/2021    DTaP/Tdap/Td vaccine (7 - Td) 12/19/2021    Colon cancer screen colonoscopy  04/16/2023    Cervical cancer screen  05/27/2025    Hepatitis A vaccine  Completed    Hepatitis B vaccine  Completed    Flu vaccine  Completed    Pneumococcal 0-64 years Vaccine  Completed    Hepatitis C screen  Completed    HIV screen  Completed  Hib vaccine  Aged Out    Meningococcal (ACWY) vaccine  Aged Out     The 10-year ASCVD risk score (Emili Brown, et al., 2013) is: 0.6%    Values used to calculate the score:      Age: 39 years      Sex: Female      Is Non- : No      Diabetic: Yes      Tobacco smoker: No      Systolic Blood Pressure: 049 mmHg      Is BP treated: No      HDL Cholesterol: 63 mg/dL      Total Cholesterol: 148 mg/dL  Prior to Visit Medications    Medication Sig Taking? Authorizing Provider   metFORMIN (GLUCOPHAGE-XR) 500 MG extended release tablet TAKE 4 TABLETS BY MOUTH EVERY DAY WITH BREAKFAST Yes Macie Mandel MD   Dulaglutide (TRULICITY) 1.5 BU/0.6LU SOPN ADMINISTER 0.5 ML UNDER THE SKIN 1 TIME A WEEK Yes Macie Mandel MD   clotrimazole-betamethasone (LOTRISONE) 1-0.05 % cream Apply topically 2 times daily.  Yes Macie Mandel MD   losartan (COZAAR) 25 MG tablet TAKE 1 TABLET BY MOUTH DAILY Yes Bennie Joseph MD   sertraline (ZOLOFT) 50 MG tablet TAKE 1 TABLET BY MOUTH DAILY Yes Macie Mandel MD   pantoprazole (PROTONIX) 40 MG tablet TAKE 1 TABLET BY MOUTH TWICE DAILY Yes OC Wahl - ROSA ELENA   ISIBLOOM 0.15-30 MG-MCG per tablet TAKE 1 TABLET BY MOUTH DAILY( ALTERNATE FOR ENSKYCE) Yes Macie Mandel MD   montelukast (SINGULAIR) 10 MG tablet TAKE 1 TABLET BY MOUTH EVERY EVENING Yes Macie Mandel MD   Clobetasol Propionate 0.05 % LIQD Apply 1 applicator topically daily Yes Joni Brittle, APRN - CNP   azelastine (ASTELIN) 0.1 % nasal spray 1 spray by Nasal route 2 times daily Use in each nostril as directed Yes Historical Provider, MD   Cannabidiol 100 MG/ML SOLN PRN anxiety Yes Macie Mandel MD   fluticasone propionate (FLOVENT DISKUS) 100 MCG/BLIST AEPB inhaler Inhale 2 puffs into the lungs daily Yes Macie Mandel MD   aspirin EC 81 MG EC tablet Take 1 tablet by mouth daily Yes Macie Mandel MD   blood glucose test strips (ASCENSIA AUTODISC VI;ONE TOUCH ULTRA TEST VI) strip twice a day Yes Aura Hallman MD   Lancet Devices (PRODIGY LANCING DEVICE) MISC as needed Yes Aura Hallman MD   Blood Glucose Monitoring Suppl (PRODIGY POCKET BLOOD GLUCOSE) w/Device KIT as needed Yes Aura Hallman MD   pimecrolimus (ELIDEL) 1 % cream Apply topically 2 times daily. Yes Aura Hallman MD   dicyclomine (BENTYL) 20 MG tablet TAKE 1 TABLET BY MOUTH THREE TIMES DAILY AS NEEDED FOR CRAMPING Yes Aura Hallman MD   albuterol sulfate  (90 BASE) MCG/ACT inhaler Inhale 2 puffs into the lungs every 4 hours as needed for Wheezing May substitute for insurance preferred (Ventolin, Proventil, ProAir) Yes Aura Hallman MD   fluticasone (FLONASE) 50 MCG/ACT nasal spray 1 spray by Nasal route daily Yes Aura Hallman MD   loratadine (CLARITIN) 10 MG tablet Take 10 mg by mouth daily. Yes Historical Provider, MD   PRODIGY LANCETS 28G MISC 1 each by Does not apply route 2 times daily  Aura Hallman MD      Family History   Problem Relation Age of Onset    Cancer Sister         peritoneal    Diabetes Maternal Grandmother     Other Brother         SARA     Social History     Tobacco Use    Smoking status: Never Smoker    Smokeless tobacco: Never Used    Tobacco comment: advised not to start   Substance Use Topics    Alcohol use:  Yes     Alcohol/week: 1.0 standard drinks     Types: 1 Cans of beer per week    Drug use: No      LAST LABS  Cholesterol, Total   Date Value Ref Range Status   02/23/2020 148 0 - 199 mg/dL Final     LDL Calculated   Date Value Ref Range Status   02/23/2020 61 <100 mg/dL Final     HDL   Date Value Ref Range Status   02/23/2020 63 (H) 40 - 60 mg/dL Final     Triglycerides   Date Value Ref Range Status   02/23/2020 121 0 - 150 mg/dL Final     Lab Results   Component Value Date    GLUCOSE 122 (H) 02/23/2020     Lab Results   Component Value Date     02/23/2020    K 3.8 02/23/2020    CREATININE 0.6 02/23/2020     Lab Results   Component Value Date    WBC 10.4 02/23/2020    HGB 11.8 (L) 02/23/2020 HCT 35.7 (L) 02/23/2020    MCV 82.6 02/23/2020     02/23/2020     Lab Results   Component Value Date    ALT 8 (L) 06/18/2019    AST 10 (L) 06/18/2019    ALKPHOS 95 06/18/2019    BILITOT 0.3 06/18/2019     TSH (uIU/mL)   Date Value   09/12/2018 2.16     Lab Results   Component Value Date    LABA1C 5.7 02/23/2020     Objective:   PHYSICAL EXAM   /84   Pulse 100   Temp 97.5 °F (36.4 °C)   Resp 14   Ht 5' 7\" (1.702 m)   Wt 295 lb (133.8 kg)   SpO2 98%   BMI 46.20 kg/m²   BP Readings from Last 5 Encounters:   03/17/21 128/84   11/16/20 117/83   10/15/20 116/74   09/21/20 (!) 154/69   08/04/20 130/83     Wt Readings from Last 5 Encounters:   03/17/21 295 lb (133.8 kg)   11/16/20 283 lb (128.4 kg)   10/15/20 282 lb 12.8 oz (128.3 kg)   09/21/20 284 lb 6.4 oz (129 kg)   08/04/20 281 lb 9.6 oz (127.7 kg)      GENERAL:   · well-developed, well-nourished, alert, no distress. EYES:   · External findings: lids and lashes normal and conjunctivae and sclerae normal  · Eyes: no periorbital cellulitis. ENT:   · External nose and ears appear normal  · normal TM's and external ear canals both ears  · Pharynx: normal. Exudates: None  · Lips, mucosa, and tongue normal  · Hearing grossly normal.     NECK:   · Supple, symmetrical, trachea midline  · Thyroid not enlarged, symmetric, no tenderness/mass/nodules  LYMPH:  · no cervical nodes, no supraclavicular nodes  LUNGS:    · Breathing unlabored  · clear to auscultation bilaterally and good air movement  CARDIOVASC:   · regular rate and rhythm, S1, S2 normal. No murmur, click, rub or gallop  · Apical impulse normal  · LEGS:  Lower extremity edema: none    ABDOMEN:   · Soft, non-tender, no masses  · No hepatosplenomegaly  · No hernias noted.   Exam limited by N/A  SKIN: warm and dry  · No rashes or suspicious lesions  · No nodules or induration  PSYCH:    · Alert and oriented  · Normal reasoning, insight good  · Facial expressions full, mood appropriate  · No memory disturbance noted  MUSCULOSKEL:    · Gait normal, assistive device: none  · No significant finger or nail findings  · Spine symmetric, no deformities, no kyphosis      Assessment and Plan:      Diagnosis Orders   1. Annual physical exam     2. Type 2 diabetes mellitus with diabetic nephropathy, without long-term current use of insulin (HCC)  HEMOGLOBIN A1C   3. Hyperlipidemia LDL goal <100  Comprehensive Metabolic Panel    Lipid Panel   4. Mild intermittent asthma without complication     5. Anemia, unspecified type  CBC Auto Differential    Iron and TIBC    Ferritin   6. Obesity, Class III, BMI 40-49.9 (morbid obesity) ([facility] [address]     7. Obstructive apnea- CPAP     8. Adjustment disorder with anxious mood     Stable. Plan as above and below. INSTRUCTIONS  · NEXT APPOINTMENT: Please schedule check-up in 6 months. · PLEASE TAKE THIS FORM TO CHECK-OUT WINDOW TO SCHEDULE NEXT VISIT. PLEASE GET FASTING BLOODWORK DRAWN SOON. Lab is on first floor in [address]. Hours Monday to Friday 7 AM to 5 PM.   · Watch tremor. If becomes more persistent, may need studies or Neurology. · Try taking zoloft at bedtime. · Getting back to gym soon.

## 2021-03-20 DIAGNOSIS — K21.9 GASTROESOPHAGEAL REFLUX DISEASE WITHOUT ESOPHAGITIS: Chronic | ICD-10-CM

## 2021-03-20 DIAGNOSIS — E11.21 TYPE 2 DIABETES MELLITUS WITH DIABETIC NEPHROPATHY, WITHOUT LONG-TERM CURRENT USE OF INSULIN (HCC): ICD-10-CM

## 2021-03-20 DIAGNOSIS — J45.21 MILD INTERMITTENT ASTHMA WITH ACUTE EXACERBATION: Chronic | ICD-10-CM

## 2021-03-22 RX ORDER — METFORMIN HYDROCHLORIDE 500 MG/1
TABLET, EXTENDED RELEASE ORAL
Qty: 360 TABLET | Refills: 0 | OUTPATIENT
Start: 2021-03-22

## 2021-03-22 RX ORDER — MONTELUKAST SODIUM 10 MG/1
TABLET ORAL
Qty: 90 TABLET | Refills: 3 | Status: SHIPPED | OUTPATIENT
Start: 2021-03-22 | End: 2021-09-17 | Stop reason: SDUPTHER

## 2021-03-22 RX ORDER — METFORMIN HYDROCHLORIDE 500 MG/1
TABLET, EXTENDED RELEASE ORAL
Qty: 360 TABLET | Refills: 0 | Status: SHIPPED | OUTPATIENT
Start: 2021-03-22 | End: 2021-06-22

## 2021-03-30 DIAGNOSIS — D64.9 ANEMIA, UNSPECIFIED TYPE: ICD-10-CM

## 2021-03-30 DIAGNOSIS — E11.21 TYPE 2 DIABETES MELLITUS WITH DIABETIC NEPHROPATHY, WITHOUT LONG-TERM CURRENT USE OF INSULIN (HCC): Chronic | ICD-10-CM

## 2021-03-30 DIAGNOSIS — E78.5 HYPERLIPIDEMIA LDL GOAL <100: ICD-10-CM

## 2021-03-30 LAB
A/G RATIO: 1.1 (ref 1.1–2.2)
ALBUMIN SERPL-MCNC: 3.9 G/DL (ref 3.4–5)
ALP BLD-CCNC: 115 U/L (ref 40–129)
ALT SERPL-CCNC: 7 U/L (ref 10–40)
ANION GAP SERPL CALCULATED.3IONS-SCNC: 13 MMOL/L (ref 3–16)
AST SERPL-CCNC: 10 U/L (ref 15–37)
BASOPHILS ABSOLUTE: 0.1 K/UL (ref 0–0.2)
BASOPHILS RELATIVE PERCENT: 0.6 %
BILIRUB SERPL-MCNC: 0.4 MG/DL (ref 0–1)
BUN BLDV-MCNC: 14 MG/DL (ref 7–20)
CALCIUM SERPL-MCNC: 9.4 MG/DL (ref 8.3–10.6)
CHLORIDE BLD-SCNC: 101 MMOL/L (ref 99–110)
CHOLESTEROL, TOTAL: 213 MG/DL (ref 0–199)
CO2: 24 MMOL/L (ref 21–32)
CREAT SERPL-MCNC: 0.7 MG/DL (ref 0.6–1.1)
EOSINOPHILS ABSOLUTE: 0.2 K/UL (ref 0–0.6)
EOSINOPHILS RELATIVE PERCENT: 2 %
FERRITIN: 18 NG/ML (ref 15–150)
GFR AFRICAN AMERICAN: >60
GFR NON-AFRICAN AMERICAN: >60
GLOBULIN: 3.4 G/DL
GLUCOSE BLD-MCNC: 114 MG/DL (ref 70–99)
HCT VFR BLD CALC: 38.4 % (ref 36–48)
HDLC SERPL-MCNC: 84 MG/DL (ref 40–60)
HEMOGLOBIN: 12.4 G/DL (ref 12–16)
IRON SATURATION: 13 % (ref 15–50)
IRON: 62 UG/DL (ref 37–145)
LDL CHOLESTEROL CALCULATED: 97 MG/DL
LYMPHOCYTES ABSOLUTE: 3.2 K/UL (ref 1–5.1)
LYMPHOCYTES RELATIVE PERCENT: 29.1 %
MCH RBC QN AUTO: 26.4 PG (ref 26–34)
MCHC RBC AUTO-ENTMCNC: 32.2 G/DL (ref 31–36)
MCV RBC AUTO: 82 FL (ref 80–100)
MONOCYTES ABSOLUTE: 0.6 K/UL (ref 0–1.3)
MONOCYTES RELATIVE PERCENT: 5.7 %
NEUTROPHILS ABSOLUTE: 7 K/UL (ref 1.7–7.7)
NEUTROPHILS RELATIVE PERCENT: 62.6 %
PDW BLD-RTO: 17.2 % (ref 12.4–15.4)
PLATELET # BLD: 293 K/UL (ref 135–450)
PMV BLD AUTO: 9 FL (ref 5–10.5)
POTASSIUM SERPL-SCNC: 4.5 MMOL/L (ref 3.5–5.1)
RBC # BLD: 4.68 M/UL (ref 4–5.2)
SODIUM BLD-SCNC: 138 MMOL/L (ref 136–145)
TOTAL IRON BINDING CAPACITY: 495 UG/DL (ref 260–445)
TOTAL PROTEIN: 7.3 G/DL (ref 6.4–8.2)
TRIGL SERPL-MCNC: 161 MG/DL (ref 0–150)
VLDLC SERPL CALC-MCNC: 32 MG/DL
WBC # BLD: 11.1 K/UL (ref 4–11)

## 2021-03-31 LAB
ESTIMATED AVERAGE GLUCOSE: 128.4 MG/DL
HBA1C MFR BLD: 6.1 %

## 2021-04-01 ENCOUNTER — PATIENT MESSAGE (OUTPATIENT)
Dept: FAMILY MEDICINE CLINIC | Age: 46
End: 2021-04-01

## 2021-04-05 NOTE — TELEPHONE ENCOUNTER
From: Viry Constantino  To: Arlene Harrison MD  Sent: 4/1/2021 6:19 PM EDT  Subject: Test Results Question    Hi Dr. Rachael Arthur, I finally got my bloodwork done. I see my a1c slipped a little; I'll work on that. [employer] is failing to clarify the iron results for me. I get a fair bit of nutritional iron: legumes, edamame, red meat, and bananas. According to my allergist I have a sensitivity to highly acidic foods though (they make my mouth numb) so I can't just add citrus to help absorb the iron in plants. Should I add a supplement? I take a women's supplement with probiotics and vitamin d but no other vitamins. Anything I should know about my other results?

## 2021-04-06 ENCOUNTER — IMMUNIZATION (OUTPATIENT)
Dept: FAMILY MEDICINE CLINIC | Age: 46
End: 2021-04-06
Payer: COMMERCIAL

## 2021-04-09 PROCEDURE — 91300 COVID-19, PFIZER VACCINE 30MCG/0.3ML DOSE: CPT | Performed by: FAMILY MEDICINE

## 2021-04-09 PROCEDURE — 0002A COVID-19, PFIZER VACCINE 30MCG/0.3ML DOSE: CPT | Performed by: FAMILY MEDICINE

## 2021-04-13 DIAGNOSIS — J45.21 MILD INTERMITTENT ASTHMA WITH ACUTE EXACERBATION: Chronic | ICD-10-CM

## 2021-04-13 RX ORDER — FLUTICASONE PROPIONATE 100 MCG
BLISTER, WITH INHALATION DEVICE INHALATION
Qty: 60 EACH | Refills: 2 | Status: SHIPPED | OUTPATIENT
Start: 2021-04-13 | End: 2022-04-22

## 2021-04-15 DIAGNOSIS — E11.21 TYPE 2 DIABETES MELLITUS WITH DIABETIC NEPHROPATHY, UNSPECIFIED WHETHER LONG TERM INSULIN USE (HCC): ICD-10-CM

## 2021-04-15 RX ORDER — DESOGESTREL AND ETHINYL ESTRADIOL 0.15-0.03
KIT ORAL
Qty: 84 TABLET | Refills: 3 | Status: SHIPPED | OUTPATIENT
Start: 2021-04-15 | End: 2022-03-23

## 2021-04-15 RX ORDER — DULAGLUTIDE 1.5 MG/.5ML
INJECTION, SOLUTION SUBCUTANEOUS
Qty: 6 ML | Refills: 0 | Status: SHIPPED | OUTPATIENT
Start: 2021-04-15 | End: 2021-06-22

## 2021-04-17 DIAGNOSIS — E11.21 TYPE 2 DIABETES MELLITUS WITH DIABETIC NEPHROPATHY, UNSPECIFIED WHETHER LONG TERM INSULIN USE (HCC): ICD-10-CM

## 2021-04-19 RX ORDER — LOSARTAN POTASSIUM 25 MG/1
TABLET ORAL
Qty: 90 TABLET | Refills: 1 | Status: SHIPPED | OUTPATIENT
Start: 2021-04-19 | End: 2021-09-17 | Stop reason: SDUPTHER

## 2021-05-11 DIAGNOSIS — K21.9 GASTROESOPHAGEAL REFLUX DISEASE WITHOUT ESOPHAGITIS: Chronic | ICD-10-CM

## 2021-05-11 DIAGNOSIS — E11.21 TYPE 2 DIABETES MELLITUS WITH DIABETIC NEPHROPATHY, WITHOUT LONG-TERM CURRENT USE OF INSULIN (HCC): ICD-10-CM

## 2021-05-11 RX ORDER — METFORMIN HYDROCHLORIDE 500 MG/1
TABLET, EXTENDED RELEASE ORAL
Qty: 360 TABLET | Refills: 0 | OUTPATIENT
Start: 2021-05-11

## 2021-06-22 DIAGNOSIS — K21.9 GASTROESOPHAGEAL REFLUX DISEASE WITHOUT ESOPHAGITIS: Chronic | ICD-10-CM

## 2021-06-22 DIAGNOSIS — E11.21 TYPE 2 DIABETES MELLITUS WITH DIABETIC NEPHROPATHY, WITHOUT LONG-TERM CURRENT USE OF INSULIN (HCC): ICD-10-CM

## 2021-06-22 DIAGNOSIS — E11.21 TYPE 2 DIABETES MELLITUS WITH DIABETIC NEPHROPATHY, UNSPECIFIED WHETHER LONG TERM INSULIN USE (HCC): ICD-10-CM

## 2021-06-22 RX ORDER — DULAGLUTIDE 1.5 MG/.5ML
INJECTION, SOLUTION SUBCUTANEOUS
Qty: 6 ML | Refills: 0 | Status: SHIPPED | OUTPATIENT
Start: 2021-06-22 | End: 2021-09-17 | Stop reason: SDUPTHER

## 2021-06-22 RX ORDER — METFORMIN HYDROCHLORIDE 500 MG/1
TABLET, EXTENDED RELEASE ORAL
Qty: 360 TABLET | Refills: 0 | Status: SHIPPED | OUTPATIENT
Start: 2021-06-22 | End: 2021-08-16

## 2021-07-31 ENCOUNTER — HOSPITAL ENCOUNTER (EMERGENCY)
Age: 46
Discharge: HOME | End: 2021-07-31
Payer: COMMERCIAL

## 2021-07-31 VITALS
TEMPERATURE: 98.1 F | HEART RATE: 72 BPM | OXYGEN SATURATION: 98 % | DIASTOLIC BLOOD PRESSURE: 76 MMHG | HEIGHT: 67 IN | WEIGHT: 293 LBS | SYSTOLIC BLOOD PRESSURE: 116 MMHG | BODY MASS INDEX: 45.99 KG/M2 | RESPIRATION RATE: 14 BRPM

## 2021-07-31 DIAGNOSIS — M79.661 RIGHT CALF PAIN: Primary | ICD-10-CM

## 2021-07-31 PROCEDURE — 99285 EMERGENCY DEPT VISIT HI MDM: CPT

## 2021-07-31 PROCEDURE — 93971 EXTREMITY STUDY: CPT

## 2021-07-31 ASSESSMENT — PAIN SCALES - GENERAL
PAINLEVEL_OUTOF10: 0
PAINLEVEL_OUTOF10: 5

## 2021-07-31 ASSESSMENT — PAIN DESCRIPTION - LOCATION: LOCATION: LEG

## 2021-07-31 ASSESSMENT — PAIN DESCRIPTION - PAIN TYPE: TYPE: ACUTE PAIN

## 2021-07-31 ASSESSMENT — PAIN DESCRIPTION - ORIENTATION: ORIENTATION: RIGHT

## 2021-07-31 NOTE — ED PROVIDER NOTES
[address]        Pt Name: Darnell Julian  MRN: [id number]  [place] 1975  Date of evaluation: 7/31/2021  Provider: HEATHER Mccarty    This patient was not seen and evaluated by the attending physician No att. providers found. CHIEF COMPLAINT     Right calf pain      HISTORY OF PRESENT ILLNESS  (Location/Symptom, Timing/Onset, Context/Setting, Quality, Duration,Modifying Factors, Severity.)   Darnell Julian is a 39 y.o. female who presents to the emergencydepartment for a right calf pain. Reports started about a week and a half ago. Thought it was related to increased activity at her water aerobics class. The pain has persisted so she began worried for DVT. Reports today calf was also swollen today. No redness. She is been taking ibuprofen and Flexeril with no relief. Denies prior episodes. Denies nausea vomiting abdominal pain chest pain shortness of breath. Denies fever. Denies prior history of VTE. She is on oral contraceptives. Does not smoke. No recent long distance travel. No family history of VTE       Nursing Notes were reviewed and I agree. OF SYSTEMS    (2-9 systems for level 4, 10 or more for level 5)     Review of Systems   Constitutional: Negative for fever. Respiratory: Negative for shortness of breath. Cardiovascular: Negative for chest pain. Gastrointestinal: Negative for abdominal pain, nausea and vomiting. Musculoskeletal: Positive for myalgias. Except as noted above the remainder of the review of systems was reviewed and negative.        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    Chronic folliculitis 4/17/9480    Colon polyp 5/2/2018    Dysfunctional uterine bleeding     False positive serology for HIV 7/30/2017    H/O colonoscopy- done 4/12/2018 polyp, repeat 5 years 4/13/2018    Obstructive apnea     Seborrhea     Urticaria, idiopathic        SURGICAL HISTORY         Procedure Laterality Date    ANKLE SURGERY Left     ENDOSCOPY, COLON, DIAGNOSTIC      MOUTH SURGERY  2016    gingival auto graft       CURRENT MEDICATIONS       Discharge Medication List as of 7/31/2021  3:28 PM      CONTINUE these medications which have NOT CHANGED    Details   TRULICITY 1.5 XT/0.4QF SOPN ADMINISTER 1.5 MG UNDER THE SKIN 1 TIME A WEEK, Disp-6 mL, R-0Normal      metFORMIN (GLUCOPHAGE-XR) 500 MG extended release tablet TAKE 4 TABLETS BY MOUTH EVERY DAY WITH BREAKFAST, Disp-360 tablet, R-0Normal      losartan (COZAAR) 25 MG tablet TAKE 1 TABLET BY MOUTH DAILY, Disp-90 tablet, R-1**Patient requests 90 days supply**Normal      ISIBLOOM 0.15-30 MG-MCG per tablet TAKE 1 TABLET BY MOUTH DAILY, Disp-84 tablet, R-3Normal      FLOVENT DISKUS 100 MCG/BLIST AEPB inhaler INHALE 2 PUFFS INTO THE LUNGS DAILY, Disp-60 each, R-2Normal      montelukast (SINGULAIR) 10 MG tablet TAKE 1 TABLET BY MOUTH EVERY EVENING, Disp-90 tablet, R-3Normal      sertraline (ZOLOFT) 50 MG tablet TAKE 1 TABLET BY MOUTH EVERY DAY, Disp-90 tablet, R-1Normal      clotrimazole-betamethasone (LOTRISONE) 1-0.05 % cream Apply topically 2 times daily. , Disp-60 g, R-0, Normal      pantoprazole (PROTONIX) 40 MG tablet TAKE 1 TABLET BY MOUTH TWICE DAILY, Disp-180 tablet,R-0**Patient requests 90 days supply**Normal      Clobetasol Propionate 0.05 % LIQD Apply 1 applicator topically daily, Disp-125 mL, R-1Normal      azelastine (ASTELIN) 0.1 % nasal spray 1 spray by Nasal route 2 times daily Use in each nostril as directedHistorical Med      Cannabidiol 100 MG/ML SOLN PRN anxietyOTC      blood glucose test strips (ASCENSIA AUTODISC VI;ONE TOUCH ULTRA TEST VI) strip Disp-200 strip, R-3, Normaltwice a day      Lancet Devices (PRODIGY LANCING DEVICE) MISC Disp-1 each, R-0, Normalas needed      Blood Glucose Monitoring Suppl (PRODIGY POCKET BLOOD Head: Normocephalic and atraumatic. Cardiovascular:      Rate and Rhythm: Normal rate and regular rhythm. Heart sounds: Normal heart sounds. Pulmonary:      Effort: Pulmonary effort is normal. No respiratory distress. Breath sounds: Normal breath sounds. Musculoskeletal:         General: Normal range of motion. Cervical back: Normal range of motion and neck supple. Comments: Mild TTP of the mid right calf with no erythema edema or ecchymosis. Anterior right lower leg nontender. Knee and ankle are nontender. DP pulse 2+. Compartments of the lower leg are soft. Skin:     General: Skin is warm. Neurological:      Mental Status: She is alert. Psychiatric:         Mood and Affect: Mood normal.         Behavior: Behavior normal.         Thought Content: Thought content normal.         Judgment: Judgment normal.         DIFFERENTIAL DIAGNOSIS   Musculoskeletal pain, DVT, venous stasis, cellulitis, other    DIAGNOSTICRESULTS         RADIOLOGY:   Non-plain film images such as CT, Ultrasound and MRI are read by the radiologist. Plain radiographic images are visualized and preliminarily interpreted by HEATHER Ravi with the below findings:      Interpretation per the Radiologist below, if available at the time of this note:    VL Extremity Venous Right   Final Result            LABS:  Labs Reviewed - No data to display    All other labs were within normal range or not returned as of this dictation. EMERGENCY DEPARTMENT COURSE and DIFFERENTIALDIAGNOSIS/MDM:   Vitals:    Vitals:    07/31/21 1244 07/31/21 1531   BP: 118/82 116/76   Pulse: 96 72   Resp: 20 14   Temp: 98.1 °F (36.7 °C) 98.1 °F (36.7 °C)   TempSrc: Oral Oral   SpO2: 95% 98%   Weight: 293 lb 3.4 oz (133 kg)    Height: 5' 7\" (1.702 m)        Patient wasnontoxic, well appearing, afebrile with normal vital signs. Saturating well on room air. No chest pain shortness of breath.   Doppler ultrasound was negative for

## 2021-08-02 ASSESSMENT — ENCOUNTER SYMPTOMS
NAUSEA: 0
VOMITING: 0
ABDOMINAL PAIN: 0
SHORTNESS OF BREATH: 0

## 2021-08-06 ENCOUNTER — OFFICE VISIT (OUTPATIENT)
Dept: FAMILY MEDICINE CLINIC | Age: 46
End: 2021-08-06
Payer: COMMERCIAL

## 2021-08-06 VITALS
HEIGHT: 67 IN | OXYGEN SATURATION: 98 % | DIASTOLIC BLOOD PRESSURE: 80 MMHG | RESPIRATION RATE: 16 BRPM | HEART RATE: 100 BPM | WEIGHT: 293 LBS | SYSTOLIC BLOOD PRESSURE: 126 MMHG | BODY MASS INDEX: 45.99 KG/M2

## 2021-08-06 DIAGNOSIS — M79.661 RIGHT CALF PAIN: Primary | ICD-10-CM

## 2021-08-06 PROCEDURE — 99213 OFFICE O/P EST LOW 20 MIN: CPT | Performed by: FAMILY MEDICINE

## 2021-08-06 NOTE — PROGRESS NOTES
EMERGENCY ROOM FOLLOW-UP  Chief Complaint   Patient presents with    ED Follow-up      Subjective:    Patient here for follow-up from ER on 07/31/2021  Symptoms: pain in right calf x 2 weeks. Hurts when she walks. If she is on it and then sits down it will start throbbing. Keeps her up at night a little. Diagnosis: muscle pull during water aerobics, but not a blood clot   Treatment: ice and heat massage   At present: still hurts the same not going away. Using heat elevate and ibprofen 800 helps    Review of Systems   General ROS: fever? No,    Night sweats? No  Respiratory ROS: cough? No   Wheezing? No  Cardiovascular ROS: chest pain? No   Shortness of breath? No  Gastrointestinal ROS: abdominal pain? No   Change in stools? No  Genito-Urinary ROS: painful urination? No   Trouble urinating? No  Neurological ROS: TIA or stroke symptoms? No   Numbness/tingling? No    HISTORY:  Patient's medications, allergies, past medical, and social histories were reviewed and updated as appropriate.         *Chief complaint, HPI and History provided by the medical assistant has been reviewed and verified by provider Maru Mariscal MD    CHART REVIEW  Health Maintenance   Topic Date Due    Pneumococcal 0-64 years Vaccine (2 of 4 - PCV13) 11/05/2016    Diabetic foot exam  01/08/2021    Flu vaccine (1) 09/01/2021    Diabetic retinal exam  12/18/2021    DTaP/Tdap/Td vaccine (7 - Td or Tdap) 12/19/2021    A1C test (Diabetic or Prediabetic)  03/30/2022    Lipid screen  03/30/2022    Potassium monitoring  03/30/2022    Creatinine monitoring  03/30/2022    Cervical cancer screen  05/27/2025    Colon cancer screen colonoscopy  07/08/2026    Hepatitis A vaccine  Completed    Hepatitis B vaccine  Completed    COVID-19 Vaccine  Completed    Hepatitis C screen  Completed    HIV screen  Completed    Hib vaccine  Aged Out    Meningococcal (ACWY) vaccine  Aged Out     The 10-year ASCVD risk score ([address] et al., 2013) is: 0.7%    Values used to calculate the score:      Age: 39 years      Sex: Female      Is Non- : No      Diabetic: Yes      Tobacco smoker: No      Systolic Blood Pressure: 827 mmHg      Is BP treated: No      HDL Cholesterol: 84 mg/dL      Total Cholesterol: 213 mg/dL  Prior to Visit Medications    Medication Sig Taking? Authorizing Provider   TRULICITY 1.5 RK/9.8GR SOPN ADMINISTER 1.5 MG UNDER THE SKIN 1 TIME A WEEK Yes Isadora Ford MD   metFORMIN (GLUCOPHAGE-XR) 500 MG extended release tablet TAKE 4 TABLETS BY MOUTH EVERY DAY WITH BREAKFAST Yes Isadora Ford MD   losartan (COZAAR) 25 MG tablet TAKE 1 TABLET BY MOUTH DAILY Yes Isadora Ford MD   ISIBLOOM 0.15-30 MG-MCG per tablet TAKE 1 TABLET BY MOUTH DAILY Yes Isadora Ford MD   FLOVENT DISKUS 100 MCG/BLIST AEPB inhaler INHALE 2 PUFFS INTO THE LUNGS DAILY Yes Isadora Ford MD   montelukast (SINGULAIR) 10 MG tablet TAKE 1 TABLET BY MOUTH EVERY EVENING Yes Isadora Ford MD   sertraline (ZOLOFT) 50 MG tablet TAKE 1 TABLET BY MOUTH EVERY DAY Yes Isadora Ford MD   clotrimazole-betamethasone (LOTRISONE) 1-0.05 % cream Apply topically 2 times daily.  Yes Isadora Ford MD   pantoprazole (PROTONIX) 40 MG tablet TAKE 1 TABLET BY MOUTH TWICE DAILY Yes OC Walters CNP   Clobetasol Propionate 0.05 % LIQD Apply 1 applicator topically daily Yes OC Forbes CNP   azelastine (ASTELIN) 0.1 % nasal spray 1 spray by Nasal route 2 times daily Use in each nostril as directed Yes Historical Provider, MD   Cannabidiol 100 MG/ML SOLN PRN anxiety Yes Isadora Ford MD   aspirin EC 81 MG EC tablet Take 1 tablet by mouth daily Yes Isadora Ford MD   blood glucose test strips (ASCENSIA AUTODISC VI;ONE TOUCH ULTRA TEST VI) strip twice a day Yes Isadora Ford MD   Lancet Devices (PRODIGY LANCING DEVICE) MISC as needed Yes Isadora Ford MD   Blood Glucose Monitoring Suppl (PRODIGY POCKET BLOOD GLUCOSE) w/Device KIT as needed Yes Isadora Ford, MD   pimecrolimus (ELIDEL) 1 % cream Apply topically 2 times daily. Yes Virginia Floyd MD   dicyclomine (BENTYL) 20 MG tablet TAKE 1 TABLET BY MOUTH THREE TIMES DAILY AS NEEDED FOR CRAMPING Yes Virginia Floyd MD   albuterol sulfate  (90 BASE) MCG/ACT inhaler Inhale 2 puffs into the lungs every 4 hours as needed for Wheezing May substitute for insurance preferred (Ventolin, Proventil, ProAir) Yes Virginia Floyd MD   fluticasone (FLONASE) 50 MCG/ACT nasal spray 1 spray by Nasal route daily Yes Virginia Floyd MD   loratadine (CLARITIN) 10 MG tablet Take 10 mg by mouth daily. Yes Historical Provider, MD   PRODIGY LANCETS 28G MISC 1 each by Does not apply route 2 times daily  Virginia Floyd MD      Family History   Problem Relation Age of Onset    Cancer Sister         peritoneal    Diabetes Maternal Grandmother     Other Brother         SARA     Social History     Tobacco Use    Smoking status: Never Smoker    Smokeless tobacco: Never Used    Tobacco comment: advised not to start   Vaping Use    Vaping Use: Never used   Substance Use Topics    Alcohol use:  Yes     Alcohol/week: 1.0 standard drinks     Types: 1 Cans of beer per week    Drug use: No      LAST LABS  Cholesterol, Total   Date Value Ref Range Status   03/30/2021 213 (H) 0 - 199 mg/dL Final     LDL Calculated   Date Value Ref Range Status   03/30/2021 97 <100 mg/dL Final     HDL   Date Value Ref Range Status   03/30/2021 84 (H) 40 - 60 mg/dL Final     Triglycerides   Date Value Ref Range Status   03/30/2021 161 (H) 0 - 150 mg/dL Final     Lab Results   Component Value Date    GLUCOSE 114 (H) 03/30/2021     Lab Results   Component Value Date     03/30/2021    K 4.5 03/30/2021    CREATININE 0.7 03/30/2021     Lab Results   Component Value Date    WBC 11.1 (H) 03/30/2021    HGB 12.4 03/30/2021    HCT 38.4 03/30/2021    MCV 82.0 03/30/2021     03/30/2021     Lab Results   Component Value Date    ALT 7 (L) 03/30/2021    AST 10 (L) 03/30/2021    ALKPHOS 115 03/30/2021    BILITOT 0.4 03/30/2021     TSH (uIU/mL)   Date Value   09/12/2018 2.16     Lab Results   Component Value Date    LABA1C 6.1 03/30/2021     Objective:    /80 (Site: Left Upper Arm, Position: Sitting, Cuff Size: Large Adult)   Pulse 100   Resp 16   Ht 5' 7\" (1.702 m)   Wt 293 lb (132.9 kg)   LMP 07/17/2021   SpO2 98%   BMI 45.89 kg/m²   General appearance: alert, appears stated age and cooperative  Mild tender at localized sized R post lat mid calf    Assessment/Plan:      Diagnosis Orders   1. Right calf pain  Ambulatory referral to Physical Therapy     · OK to take ibuprofen  · Go to PT  · Use heat 20 minutes on painful joint/muscle. Then do stretches. May ice any sore spots or for swelling afterwards.

## 2021-08-06 NOTE — PATIENT INSTRUCTIONS
· OK to take ibuprofen  · Go to PT  · Use heat 20 minutes on painful joint/muscle. Then do stretches. May ice any sore spots or for swelling afterwards.

## 2021-08-13 DIAGNOSIS — K21.9 GASTROESOPHAGEAL REFLUX DISEASE WITHOUT ESOPHAGITIS: Chronic | ICD-10-CM

## 2021-08-13 DIAGNOSIS — E11.21 TYPE 2 DIABETES MELLITUS WITH DIABETIC NEPHROPATHY, WITHOUT LONG-TERM CURRENT USE OF INSULIN (HCC): ICD-10-CM

## 2021-08-16 RX ORDER — METFORMIN HYDROCHLORIDE 500 MG/1
TABLET, EXTENDED RELEASE ORAL
Qty: 360 TABLET | Refills: 0 | Status: SHIPPED | OUTPATIENT
Start: 2021-08-16 | End: 2021-09-17 | Stop reason: SDUPTHER

## 2021-09-07 ENCOUNTER — PATIENT MESSAGE (OUTPATIENT)
Dept: FAMILY MEDICINE CLINIC | Age: 46
End: 2021-09-07

## 2021-09-07 NOTE — TELEPHONE ENCOUNTER
From: Becka Joseph  To: Chato Means MD  Sent: 9/7/2021 4:37 PM EDT  Subject: Prescription Question    Hi Dr. Katya Barron, my request through [facility] for a refill on the Sertraline was denied -- I only have a few left. Should I taper off? I have an appointment with your next week but don't have enough to stretch that far taking one per day, and it's my understanding that it's not a drug to go cold turkey on.

## 2021-09-08 NOTE — TELEPHONE ENCOUNTER
Been on for a year and a half  She only has 4 more pills left  Has an appt next week but does not have enough to last till the appt.    Pt is thinking that if she is not supposed to be taking it then she needs to be tapered off  Pt wants to know if she can get a refill so she has enough to take before her appt  Pt is aware that dr. Tonny Mcmanus is not in the office today  Can another provider look at this and let her know if she can get a refill or if she needs to taper off    Her appt is on Wednesday sept 15th with dr. Tonny Mcmanus     Thank you    [place] [facility] [facility]

## 2021-09-17 ENCOUNTER — OFFICE VISIT (OUTPATIENT)
Dept: FAMILY MEDICINE CLINIC | Age: 46
End: 2021-09-17
Payer: COMMERCIAL

## 2021-09-17 VITALS
BODY MASS INDEX: 45.99 KG/M2 | DIASTOLIC BLOOD PRESSURE: 76 MMHG | HEIGHT: 67 IN | OXYGEN SATURATION: 98 % | WEIGHT: 293 LBS | HEART RATE: 98 BPM | RESPIRATION RATE: 12 BRPM | SYSTOLIC BLOOD PRESSURE: 126 MMHG

## 2021-09-17 DIAGNOSIS — J45.20 MILD INTERMITTENT ASTHMA WITHOUT COMPLICATION: Chronic | ICD-10-CM

## 2021-09-17 DIAGNOSIS — G89.29 CHRONIC BILATERAL LOW BACK PAIN WITHOUT SCIATICA: ICD-10-CM

## 2021-09-17 DIAGNOSIS — F43.22 ADJUSTMENT DISORDER WITH ANXIOUS MOOD: ICD-10-CM

## 2021-09-17 DIAGNOSIS — M25.562 CHRONIC PAIN OF BOTH KNEES: ICD-10-CM

## 2021-09-17 DIAGNOSIS — G89.29 CHRONIC PAIN OF BOTH KNEES: ICD-10-CM

## 2021-09-17 DIAGNOSIS — G89.29 CHRONIC PAIN OF BOTH SHOULDERS: ICD-10-CM

## 2021-09-17 DIAGNOSIS — M25.561 CHRONIC PAIN OF BOTH KNEES: ICD-10-CM

## 2021-09-17 DIAGNOSIS — L50.1 URTICARIA, IDIOPATHIC: ICD-10-CM

## 2021-09-17 DIAGNOSIS — E11.21 TYPE 2 DIABETES MELLITUS WITH DIABETIC NEPHROPATHY, WITHOUT LONG-TERM CURRENT USE OF INSULIN (HCC): Primary | ICD-10-CM

## 2021-09-17 DIAGNOSIS — M25.512 CHRONIC PAIN OF BOTH SHOULDERS: ICD-10-CM

## 2021-09-17 DIAGNOSIS — L50.5 CHOLINERGIC URTICARIA: ICD-10-CM

## 2021-09-17 DIAGNOSIS — J30.9 ALLERGIC RHINITIS, UNSPECIFIED SEASONALITY, UNSPECIFIED TRIGGER: ICD-10-CM

## 2021-09-17 DIAGNOSIS — K21.9 GASTROESOPHAGEAL REFLUX DISEASE WITHOUT ESOPHAGITIS: Chronic | ICD-10-CM

## 2021-09-17 DIAGNOSIS — Z23 NEEDS FLU SHOT: ICD-10-CM

## 2021-09-17 DIAGNOSIS — E11.21 DIABETIC NEPHROPATHY WITH PROTEINURIA (HCC): ICD-10-CM

## 2021-09-17 DIAGNOSIS — M25.511 CHRONIC PAIN OF BOTH SHOULDERS: ICD-10-CM

## 2021-09-17 DIAGNOSIS — M54.50 CHRONIC BILATERAL LOW BACK PAIN WITHOUT SCIATICA: ICD-10-CM

## 2021-09-17 LAB — HBA1C MFR BLD: 6 %

## 2021-09-17 PROCEDURE — 83036 HEMOGLOBIN GLYCOSYLATED A1C: CPT | Performed by: FAMILY MEDICINE

## 2021-09-17 PROCEDURE — 99214 OFFICE O/P EST MOD 30 MIN: CPT | Performed by: FAMILY MEDICINE

## 2021-09-17 PROCEDURE — 90688 IIV4 VACCINE SPLT 0.5 ML IM: CPT | Performed by: FAMILY MEDICINE

## 2021-09-17 PROCEDURE — 90471 IMMUNIZATION ADMIN: CPT | Performed by: FAMILY MEDICINE

## 2021-09-17 RX ORDER — METFORMIN HYDROCHLORIDE 500 MG/1
TABLET, EXTENDED RELEASE ORAL
Qty: 120 TABLET | Refills: 5 | Status: SHIPPED | OUTPATIENT
Start: 2021-09-17 | End: 2021-11-30

## 2021-09-17 RX ORDER — DULAGLUTIDE 1.5 MG/.5ML
INJECTION, SOLUTION SUBCUTANEOUS
Qty: 4 PEN | Refills: 5 | Status: SHIPPED | OUTPATIENT
Start: 2021-09-17 | End: 2022-03-23

## 2021-09-17 RX ORDER — AZELASTINE 1 MG/ML
1 SPRAY, METERED NASAL 2 TIMES DAILY
Qty: 30 ML | Refills: 5 | Status: SHIPPED | OUTPATIENT
Start: 2021-09-17

## 2021-09-17 RX ORDER — MONTELUKAST SODIUM 10 MG/1
TABLET ORAL
Qty: 30 TABLET | Refills: 5 | Status: SHIPPED | OUTPATIENT
Start: 2021-09-17 | End: 2022-03-25

## 2021-09-17 RX ORDER — CLOBETASOL PROPIONATE 0.46 MG/ML
SOLUTION TOPICAL
Qty: 50 ML | Refills: 5 | Status: SHIPPED | OUTPATIENT
Start: 2021-09-17

## 2021-09-17 RX ORDER — LOSARTAN POTASSIUM 25 MG/1
TABLET ORAL
Qty: 30 TABLET | Refills: 5 | Status: SHIPPED | OUTPATIENT
Start: 2021-09-17 | End: 2022-03-25

## 2021-09-17 RX ORDER — DESONIDE 0.5 MG/G
CREAM TOPICAL
Qty: 15 G | Refills: 5 | Status: SHIPPED | OUTPATIENT
Start: 2021-09-17

## 2021-09-17 NOTE — PROGRESS NOTES
CHRONIC CONDITION NOTE   Subjective:   HPI CHRONIC:   Chief Complaint   Patient presents with    Diabetes      Patient here for follow-up of multiple chronic conditions includin. Type 2 diabetes mellitus with diabetic nephropathy, without long-term current use of insulin ([address]    2. Gastroesophageal reflux disease without esophagitis    3. Mild intermittent asthma without complication    4. Cholinergic urticaria    5. Adjustment disorder with anxious mood    6. Diabetic nephropathy with proteinuria ([address]    7. Urticaria, idiopathic    8. Allergic rhinitis, unspecified seasonality, unspecified trigger    9. Chronic bilateral low back pain without sciatica    10. Chronic pain of both shoulders    11. Chronic pain of both knees    12. Needs flu shot      Complaints:   Zion Gaunt better with PPI and wedge pillow BUT having pain in bilat shoulder, knees, low back pain x 1 year, mild; stiff ache in AM then loosens. Health Maintenance Due   Topic Date Due    Diabetic foot exam  2021    COVID-19 Vaccine (3 - Pfizer risk 3-dose series) 2021    Flu vaccine (1) 2021     CHART REVIEW   reports that she has never smoked.  She has never used smokeless tobacco.  Health Maintenance Due   Topic Date Due    Diabetic foot exam  2021    COVID-19 Vaccine (3 - Pfizer risk 3-dose series) 2021    Flu vaccine (1) 2021     Current Outpatient Medications   Medication Instructions    albuterol sulfate  (90 BASE) MCG/ACT inhaler 2 puffs, Inhalation, EVERY 4 HOURS PRN, May substitute for insurance preferred (Ventolin, Proventil, ProAir)    aspirin EC 81 mg, Oral, DAILY    azelastine (ASTELIN) 0.1 % nasal spray 1 spray, Nasal, 2 TIMES DAILY, Use in each nostril as directed     Blood Glucose Monitoring Suppl (PRODIGY POCKET BLOOD GLUCOSE) w/Device KIT as needed    blood glucose test strips (ASCENSIA AUTODISC VI;ONE TOUCH ULTRA TEST VI) strip twice a day    Cannabidiol 100 MG/ML SOLN PRN anxiety    clobetasol (TEMOVATE) 0.05 % external solution Apply topically 2 times daily.  Clobetasol Propionate 8.46 % LIQD 1 applicator, Apply externally, DAILY    clotrimazole-betamethasone (LOTRISONE) 1-0.05 % cream Apply topically 2 times daily.  desonide (DESOWEN) 0.05 % cream Apply topically 2 times daily.  dicyclomine (BENTYL) 20 MG tablet TAKE 1 TABLET BY MOUTH THREE TIMES DAILY AS NEEDED FOR CRAMPING    Dulaglutide (TRULICITY) 1.5 GD/9.4RS SOPN ADMINISTER 1.5 MG UNDER THE SKIN 1 TIME A WEEK    FLOVENT DISKUS 100 MCG/BLIST AEPB inhaler INHALE 2 PUFFS INTO THE LUNGS DAILY    fluticasone (FLONASE) 50 MCG/ACT nasal spray 1 spray, Nasal, DAILY    ISIBLOOM 0.15-30 MG-MCG per tablet TAKE 1 TABLET BY MOUTH DAILY    Lancet Devices (PRODIGY LANCING DEVICE) MISC as needed    loratadine (CLARITIN) 10 mg, DAILY    losartan (COZAAR) 25 MG tablet TAKE 1 TABLET BY MOUTH DAILY    metFORMIN (GLUCOPHAGE-XR) 500 MG extended release tablet TAKE 4 TABLETS BY MOUTH EVERY DAY WITH BREAKFAST    montelukast (SINGULAIR) 10 MG tablet TAKE 1 TABLET BY MOUTH EVERY EVENING    pantoprazole (PROTONIX) 40 MG tablet TAKE 1 TABLET BY MOUTH TWICE DAILY    pimecrolimus (ELIDEL) 1 % cream Apply topically 2 times daily.     PRODIGY LANCETS 28G MISC 1 each, Does not apply, 2 TIMES DAILY    sertraline (ZOLOFT) 50 MG tablet TAKE 1 TABLET BY MOUTH EVERY DAY     LAST LABS  LDL Calculated   Date Value Ref Range Status   03/30/2021 97 <100 mg/dL Final     Lab Results   Component Value Date    HDL 84 (H) 03/30/2021     Lab Results   Component Value Date    TRIG 161 (H) 03/30/2021     Lab Results   Component Value Date     03/30/2021    K 4.5 03/30/2021    CREATININE 0.7 03/30/2021     Lab Results   Component Value Date    WBC 11.1 (H) 03/30/2021    HGB 12.4 03/30/2021     03/30/2021     Lab Results   Component Value Date    ALT 7 (L) 03/30/2021    AST 10 (L) 03/30/2021    ALKPHOS 115 03/30/2021    BILITOT 0.4 03/30/2021     TSH (uIU/mL)   Date Value   09/12/2018 2.16     No components found for: GLU  Lab Results   Component Value Date    LABA1C 6.1 03/30/2021    LABA1C 5.7 02/23/2020    LABA1C 6.0 01/08/2020     Objective:   PHYSICAL EXAM   /76 (Site: Left Upper Arm, Position: Sitting, Cuff Size: Large Adult)   Pulse 98   Resp 12   Ht 5' 7\" (1.702 m)   Wt (!) 302 lb (137 kg)   SpO2 98%   BMI 47.30 kg/m²   BP Readings from Last 5 Encounters:   09/17/21 126/76   08/06/21 126/80   07/31/21 116/76   03/17/21 128/84   11/16/20 117/83     Wt Readings from Last 5 Encounters:   09/17/21 (!) 302 lb (137 kg)   08/06/21 293 lb (132.9 kg)   07/31/21 293 lb 3.4 oz (133 kg)   03/17/21 295 lb (133.8 kg)   11/16/20 283 lb (128.4 kg)      GENERAL:   · well-developed, well-nourished, alert, no distress. LUNGS:    · Breathing unlabored  · clear to auscultation bilaterally and good air movement  CARDIOVASC:   · regular rate and rhythm  · LEGS:  Lower extremity edema: none    SKIN: warm and dry     Assessment and Plan:      Diagnosis Orders   1. Type 2 diabetes mellitus with diabetic nephropathy, without long-term current use of insulin (HCC)  metFORMIN (GLUCOPHAGE-XR) 500 MG extended release tablet    Dulaglutide (TRULICITY) 1.5 YA/5.9SW SOPN   2. Gastroesophageal reflux disease without esophagitis  metFORMIN (GLUCOPHAGE-XR) 500 MG extended release tablet   3. Mild intermittent asthma without complication     4. Cholinergic urticaria  montelukast (SINGULAIR) 10 MG tablet   5. Adjustment disorder with anxious mood  sertraline (ZOLOFT) 50 MG tablet   6. Diabetic nephropathy with proteinuria (HCC)  losartan (COZAAR) 25 MG tablet   7. Urticaria, idiopathic  desonide (DESOWEN) 0.05 % cream    clobetasol (TEMOVATE) 0.05 % external solution   8. Allergic rhinitis, unspecified seasonality, unspecified trigger  azelastine (ASTELIN) 0.1 % nasal spray   9.  Chronic bilateral low back pain without sciatica  External Referral To Physical Therapy   10. Chronic pain of both shoulders  External Referral To Physical Therapy   11. Chronic pain of both knees  External Referral To Physical Therapy   12. Needs flu shot  INFLUENZA, QUADV, 3 YRS AND OLDER, IM, MDV, 0.5ML (AFLURIA QUADV)   Stable     Continue current Tx plan. Any changes marked below. INSTRUCTIONS  · NEXT APPOINTMENT: Dr. Khari Cordova and Madison Gaston (Nurse Practitioner) are using a team approach or managing diabetics in our practice. Office visits will alternate between these providers while we continue to maintain high quality of care. · PLEASE TAKE THIS FORM TO CHECK-OUT WINDOW TO SCHEDULE NEXT VISIT.    · Go to PT to work on joints

## 2021-09-17 NOTE — PATIENT INSTRUCTIONS
INSTRUCTIONS  NEXT APPOINTMENT: Please schedule check-up in 3 months. · PLEASE TAKE THIS FORM TO CHECK-OUT WINDOW TO SCHEDULE NEXT VISIT.    · Go to PT to work on joints

## 2021-11-16 ENCOUNTER — VIRTUAL VISIT (OUTPATIENT)
Dept: PULMONOLOGY | Age: 46
End: 2021-11-16
Payer: COMMERCIAL

## 2021-11-16 DIAGNOSIS — G47.33 OBSTRUCTIVE APNEA: Primary | Chronic | ICD-10-CM

## 2021-11-16 DIAGNOSIS — K21.00 GASTROESOPHAGEAL REFLUX DISEASE WITH ESOPHAGITIS WITHOUT HEMORRHAGE: Chronic | ICD-10-CM

## 2021-11-16 DIAGNOSIS — J30.1 ALLERGIC RHINITIS DUE TO POLLEN, UNSPECIFIED SEASONALITY: Chronic | ICD-10-CM

## 2021-11-16 DIAGNOSIS — E66.01 OBESITY, CLASS III, BMI 40-49.9 (MORBID OBESITY) (HCC): Chronic | ICD-10-CM

## 2021-11-16 DIAGNOSIS — J45.20 MILD INTERMITTENT ASTHMA WITHOUT COMPLICATION: Chronic | ICD-10-CM

## 2021-11-16 DIAGNOSIS — E11.21 TYPE 2 DIABETES MELLITUS WITH DIABETIC NEPHROPATHY, WITHOUT LONG-TERM CURRENT USE OF INSULIN (HCC): Chronic | ICD-10-CM

## 2021-11-16 PROCEDURE — 99442 PR PHYS/QHP TELEPHONE EVALUATION 11-20 MIN: CPT | Performed by: NURSE PRACTITIONER

## 2021-11-16 ASSESSMENT — SLEEP AND FATIGUE QUESTIONNAIRES
HOW LIKELY ARE YOU TO NOD OFF OR FALL ASLEEP WHILE SITTING QUIETLY AFTER LUNCH WITHOUT ALCOHOL: 0
ESS TOTAL SCORE: 0
HOW LIKELY ARE YOU TO NOD OFF OR FALL ASLEEP IN A CAR, WHILE STOPPED FOR A FEW MINUTES IN TRAFFIC: 0
HOW LIKELY ARE YOU TO NOD OFF OR FALL ASLEEP WHILE SITTING INACTIVE IN A PUBLIC PLACE: 0
HOW LIKELY ARE YOU TO NOD OFF OR FALL ASLEEP WHILE LYING DOWN TO REST IN THE AFTERNOON WHEN CIRCUMSTANCES PERMIT: 0
HOW LIKELY ARE YOU TO NOD OFF OR FALL ASLEEP WHILE SITTING AND READING: 0
HOW LIKELY ARE YOU TO NOD OFF OR FALL ASLEEP WHILE WATCHING TV: 0
HOW LIKELY ARE YOU TO NOD OFF OR FALL ASLEEP WHILE SITTING AND TALKING TO SOMEONE: 0
HOW LIKELY ARE YOU TO NOD OFF OR FALL ASLEEP WHEN YOU ARE A PASSENGER IN A CAR FOR AN HOUR WITHOUT A BREAK: 0

## 2021-11-16 NOTE — PROGRESS NOTES
Amanuel Hardy         : 1975    Diagnosis: [x] SARA (G47.33) [] CSA (G47.31) [] Apnea (G47.30)   Length of Need: [x] 12 Months [] 99 Months [] Other:    Machine (MARISSA!): [] Respironics Dream Station   2   Auto [] ResMed AirSense     Auto [] Other:     []  CPAP () [] Bilevel ()   Mode: [] Auto [] Spontaneous    Mode: [] Auto [] Spontaneous            Comfort Settings:   - Ramp Pressure:  cmH2O                                        - Ramp time: 15 min                                     -  Flex/EPR - 3 full time                                    - For ResMed Bilevel (TiMax-4 sec   TiMin- 0.2 sec)     Humidifier: [x] Heated ()        [x] Water chamber replacement ()/ 1 per 6 months        Mask:   [] Nasal () /1 per 3 months [x] Full Face () /1 per 3 months   [] Patient choice -Size and fit mask [x] Patient Choice - Size and fit mask   [] Dispense:  [] Dispense:    [] Headgear () / 1 per 3 months [x] Headgear () / 1 per 3 months   [] Replacement Nasal Cushion ()/2 per month [x] Interface Replacement ()/1 per month   [] Replacement Nasal Pillows ()/2 per month         Tubing: [x] Heated ()/1 per 3 months    [] Standard ()/1 per 3 months [] Other:           Filters: [x] Non-disposable ()/1 per 6 months     [x] Ultra-Fine, Disposable ()/2 per month        Miscellaneous: [] Chin Strap ()/ 1 per 6 months [] O2 bleed-in:       LPM   [] Oximetry on CPAP/Bilevel []  Other:    [x] Modem: ()         Start Order Date: 21    MEDICAL JUSTIFICATION:  I, the undersigned, certify that the above prescribed supplies are medically necessary for this patients wellbeing. In my opinion, the supplies are both reasonable and necessary in reference to accepted standards of medicalpractice in treatment of this patients condition.     OC Hanna - ROSA ELENA      NPI: [id number]       Order Signed Date: 21    Electronically signed by Christiano Brown, OC - CNP on 2021 at 4:12 PM    Gisele Caldwell  1975  [address]   [phone] (home)   [phone] (mobile)      Insurance Info (confirm with patient if correct):  Payor/Plan Subscr  Sex Relation Sub.  Ins. ID Effective Group Num

## 2021-11-16 NOTE — PROGRESS NOTES
Nihsi Messina MD, FAASM, [facility]  Letty Menon, MSN, RN, [address] [address]  Dept: [phone]  Dept Fax: [phone]  Loc: [phone]    Subjective:     Patient ID: Gisele Caldwell is a 42 y.o. female. Chief Complaint   Patient presents with    Sleep Apnea       HPI:      Sleep Medicine Telephone Visit    Pursuant to the emergency declaration under the [address] [place] [address] waiver authority and the [employer] Act this Telephone Visit was insisted, with patient's consent, to reduce the patient's risk of exposure to COVID-19 and provide continuity of care for an established patient. Services were provided through a synchronous discussion over a telephone chat to substitute for in-person clinic visit, and coded as such. While patient is at home. Machine Modem/Download Info:                                        PAP Mask  Mask Type: Full Face mask     [place] - Total score: 0    Follow-up :     Last Visit : November 2020      Subjective Health Changes: none     Over Night Oximetry: [] Yes  [] No  [x] NA [] WNL   Using O2: [] Yes  [] No  [x] NA   Patient is compliant with the machine  [] Yes  [] No [x] Per patient   Feeling rested when using the machine   [x] Yes  [] No     Pressure is comfortable with inspiration and expiration  [x] Yes  [] No   ([x] NA   [] Feeling of suffocation  [] Feeling like not enough air    [] To much pressure)     Noticed changes in pressure  [x] NA  [] Yes    [] No     Mask is fitting well  [x] Yes  [] No   Noting Mask Air Leak  [] Yes  [x] No   Having painful Aerophagia  [] Yes  [x] No   Nocturia   0-1  per night.    Having  HA upon waking  [] Yes  [x] No   Dry mouth upon waking   Dry Nose  Dry Eyes  [x] Yes  [] No   Congestion upon waking   [] Yes  [x] No    Nose Bleeds  [] Yes  [x] No   Using Sleep Aides  [x] NA  [] OTC  [] Per our office   [] Per another provider   Understands how to change humidification and/or tubing temperature for comfort while at home  [x] Yes  [] No     Difficulties falling asleep  [] Yes  [x] No   Difficulties staying asleep  [] Yes  [x] No   Approximate time to bed  10pm-12am   Approximate wake time  6:30-10am   Taking Naps  no   If taking naps usual length  [x] NA   If taking naps using the machine [x] NA  [] Yes    [] No    [] With and With out    Drowsy when driving  [] Yes  [x] No     Does patient carry a DOT/CDL  [] Yes  [x] No     Does patient carry FAA/[employer] License   [] Yes  [x] No      Any concerns noted with the machine at this time  [] Yes  [x] No       Assessment/Plan:     1. Obstructive apnea- CPAP  Assessment & Plan:  Unable to Review compliance download with pt. Supplies and parts as needed for his machine. These are medically necessary. Continue medications per his PCP and other physicians. Limit caffeine use after 3pm.    The chronic medical conditions listed are directly related to the primary diagnosis listed above. The management of the primary diagnosis affects the secondary diagnosis and vice versa    Per patient patient IS compliant. At this time I am unable to assess this, and this could increase the risk of heart attacks, strokes, car accidents and/or death if not using the machine nightly, and with each sleep. 2. Type 2 diabetes mellitus with diabetic nephropathy, without long-term current use of insulin (HCC)  Assessment & Plan:  Chronic- stable. After speaking with patient:    Agree with current plan, and would agree to continue this plan per prescribing and managing physician. 3. Obesity, Class III, BMI 40-49.9 (morbid obesity) ([facility]  Assessment & Plan:  Patient encouraged to work on maintaining a healthy weight per height.   Achievable with diet restriction/modifications and exercise (may consult primary care if unsure of any restrictions or concerns). Weight management directly correlates to risk of control and maintenance of Obstructive Sleep Apnea. 4. Gastroesophageal reflux disease with esophagitis without hemorrhage  Assessment & Plan:  Chronic- stable. After speaking with patient:    Agree with current plan, and would agree to continue this plan per prescribing and managing physician. 5. Allergic rhinitis due to pollen, unspecified seasonality  Assessment & Plan:  Chronic- stable. After speaking with patient:    Agree with current plan, and would agree to continue this plan per prescribing and managing physician. 6. Mild intermittent asthma without complication  Assessment & Plan:  Chronic- stable. After speaking with patient:    Agree with current plan, and would agree to continue this plan per prescribing and managing physician. The chronic medical conditions listed are directly related to the primary diagnosis listed above. The management of the primary diagnosis affects the secondary diagnosis and vice versa. - After pulling data and reviewing it   - unable to Educate patient and reviewed down load from modem with the patient   - Continue medications per her PCP and other physicians.   - she instructed not to drive unless had 4 hrs of effective therapy for her SARA the night before. - Did review the risks of under or untreated SARA including, but not limited to, higher risks of motor vehicle accidents, stroke, heart attacks, and death. - she understands and accepts all these risks.     - The patient is advised to use the machine every night.   - Patient using  [employer] for supplies  - Patient educated on   The potential need to take the machine to the DME to trouble shoot, to allow the office to get a download  Sleep hygiene (primary goal to set a specific wake up time, and then work with bed time around 7-8 hours prior, but the body will work itself back)  Office locations  Setting an alarm to encourage moving to the room with the machine for quality sleep  Quality of sleep versus quantity of sleep  Short sleepers, average sleepers, and long sleepers  Compliance  Follow up  The risk of undercontrolling Obstructive sleep apnea  After speaking to the patient, patient is currently stable.  We will continue with the current machine settings  Recall Information and DME contact   The effect of anxiety, stress, and pain on sleep    - Time spent with patient 12 to include time preparing same day as appointment   -Will follow up in office in 12 months      Electronically signed by OC Brown CNP on 11/16/2021 at 4:16 PM

## 2021-12-27 ENCOUNTER — OFFICE VISIT (OUTPATIENT)
Dept: FAMILY MEDICINE CLINIC | Age: 46
End: 2021-12-27
Payer: COMMERCIAL

## 2021-12-27 VITALS
OXYGEN SATURATION: 98 % | DIASTOLIC BLOOD PRESSURE: 86 MMHG | SYSTOLIC BLOOD PRESSURE: 122 MMHG | WEIGHT: 293 LBS | RESPIRATION RATE: 12 BRPM | BODY MASS INDEX: 47.46 KG/M2 | HEART RATE: 101 BPM

## 2021-12-27 DIAGNOSIS — Z23 NEED FOR TDAP VACCINATION: ICD-10-CM

## 2021-12-27 DIAGNOSIS — G89.29 CHRONIC RIGHT-SIDED LOW BACK PAIN WITHOUT SCIATICA: ICD-10-CM

## 2021-12-27 DIAGNOSIS — M54.50 CHRONIC RIGHT-SIDED LOW BACK PAIN WITHOUT SCIATICA: ICD-10-CM

## 2021-12-27 DIAGNOSIS — E66.01 OBESITY, CLASS III, BMI 40-49.9 (MORBID OBESITY) (HCC): Chronic | ICD-10-CM

## 2021-12-27 DIAGNOSIS — E11.21 TYPE 2 DIABETES MELLITUS WITH DIABETIC NEPHROPATHY, WITHOUT LONG-TERM CURRENT USE OF INSULIN (HCC): Primary | ICD-10-CM

## 2021-12-27 DIAGNOSIS — E78.5 HYPERLIPIDEMIA LDL GOAL <100: ICD-10-CM

## 2021-12-27 DIAGNOSIS — E11.21 TYPE 2 DIABETES MELLITUS WITH DIABETIC NEPHROPATHY, WITHOUT LONG-TERM CURRENT USE OF INSULIN (HCC): ICD-10-CM

## 2021-12-27 LAB
A/G RATIO: 1.1 (ref 1.1–2.2)
ALBUMIN SERPL-MCNC: 3.8 G/DL (ref 3.4–5)
ALP BLD-CCNC: 125 U/L (ref 40–129)
ALT SERPL-CCNC: 15 U/L (ref 10–40)
ANION GAP SERPL CALCULATED.3IONS-SCNC: 15 MMOL/L (ref 3–16)
AST SERPL-CCNC: 17 U/L (ref 15–37)
BILIRUB SERPL-MCNC: 0.6 MG/DL (ref 0–1)
BUN BLDV-MCNC: 9 MG/DL (ref 7–20)
CALCIUM SERPL-MCNC: 9.8 MG/DL (ref 8.3–10.6)
CHLORIDE BLD-SCNC: 99 MMOL/L (ref 99–110)
CO2: 23 MMOL/L (ref 21–32)
CREAT SERPL-MCNC: 0.7 MG/DL (ref 0.6–1.1)
CREATININE URINE: 183 MG/DL (ref 28–259)
GFR AFRICAN AMERICAN: >60
GFR NON-AFRICAN AMERICAN: >60
GLUCOSE BLD-MCNC: 123 MG/DL (ref 70–99)
HBA1C MFR BLD: 6.5 %
MICROALBUMIN UR-MCNC: 1.9 MG/DL
MICROALBUMIN/CREAT UR-RTO: 10.4 MG/G (ref 0–30)
POTASSIUM SERPL-SCNC: 4.4 MMOL/L (ref 3.5–5.1)
SODIUM BLD-SCNC: 137 MMOL/L (ref 136–145)
TOTAL PROTEIN: 7.4 G/DL (ref 6.4–8.2)

## 2021-12-27 PROCEDURE — 83036 HEMOGLOBIN GLYCOSYLATED A1C: CPT | Performed by: NURSE PRACTITIONER

## 2021-12-27 PROCEDURE — 90715 TDAP VACCINE 7 YRS/> IM: CPT | Performed by: NURSE PRACTITIONER

## 2021-12-27 PROCEDURE — 99214 OFFICE O/P EST MOD 30 MIN: CPT | Performed by: NURSE PRACTITIONER

## 2021-12-27 PROCEDURE — 90471 IMMUNIZATION ADMIN: CPT | Performed by: NURSE PRACTITIONER

## 2021-12-27 ASSESSMENT — ENCOUNTER SYMPTOMS
COUGH: 0
ABDOMINAL PAIN: 0
SHORTNESS OF BREATH: 0
BACK PAIN: 1

## 2021-12-27 NOTE — PROGRESS NOTES
12/27/2021    This is a 42 y.o. female   Chief Complaint   Patient presents with    Diabetes   . HPI    Diabetes Mellitus Type 2: Current symptoms/problems include none. Home blood sugar records: patient does not test. Reports that when she has tested in the past it has been in the normal range. Any episodes of hypoglycemia? no  Eye exam current (within one year): yes- 12/2021-- [facility]  Tobacco history: She  reports that she has never smoked. She has never used smokeless tobacco.     Exercise- water aerobic twice weekly  Yoga at home for flexability. Diet- nothing specific     Hyperlipidemia:   Not on statin due to LDL less than 100. Lab Results   Component Value Date    LABA1C 6.0 09/17/2021    LABA1C 6.1 03/30/2021    LABA1C 5.7 02/23/2020     Lab Results   Component Value Date    LABMICR YES 05/06/2020    CREATININE 0.7 03/30/2021     Lab Results   Component Value Date    ALT 7 (L) 03/30/2021    AST 10 (L) 03/30/2021     Lab Results   Component Value Date    CHOL 213 (H) 03/30/2021    TRIG 161 (H) 03/30/2021    HDL 84 (H) 03/30/2021    LDLCALC 97 03/30/2021        Right low back pain for the past 3 months. Thinks back pain is from the way that she sleeps at night on a wedge pillow. Denies radiating pain or numbness or weakness. Taking tylenol 1000 mg daily PRN (normally every 3 days).       Patient Active Problem List   Diagnosis    Seborrhea    Dysfunctional uterine bleeding    Abnormal Pap smear- LGSIL 2001    Candidal intertrigo    Urticaria, idiopathic    Low back pain    Allergic rhinitis    Asthma- mild persistent    GERD (gastroesophageal reflux disease)    Cholinergic urticaria    Hiatal hernia    Obesity, Class III, BMI 40-49.9 (morbid obesity) (HCC)    Obstructive apnea- CPAP    Hyperlipidemia LDL goal <100    Type 2 diabetes mellitus with diabetic nephropathy (HCC)    Irritable bowel syndrome with diarrhea    History of colon polyps    Adjustment disorder with anxious mood          Current Outpatient Medications   Medication Sig Dispense Refill    metFORMIN (GLUCOPHAGE-XR) 500 MG extended release tablet TAKE 4 TABLETS BY MOUTH EVERY DAY WITH BREAKFAST 360 tablet 0    losartan (COZAAR) 25 MG tablet TAKE 1 TABLET BY MOUTH DAILY 30 tablet 5    montelukast (SINGULAIR) 10 MG tablet TAKE 1 TABLET BY MOUTH EVERY EVENING 30 tablet 5    sertraline (ZOLOFT) 50 MG tablet TAKE 1 TABLET BY MOUTH EVERY DAY 30 tablet 5    Dulaglutide (TRULICITY) 1.5 LO/6.2MP SOPN ADMINISTER 1.5 MG UNDER THE SKIN 1 TIME A WEEK 4 pen 5    desonide (DESOWEN) 0.05 % cream Apply topically 2 times daily. 15 g 5    azelastine (ASTELIN) 0.1 % nasal spray 1 spray by Nasal route 2 times daily Use in each nostril as directed 30 mL 5    clobetasol (TEMOVATE) 0.05 % external solution Apply topically 2 times daily. 50 mL 5    ISIBLOOM 0.15-30 MG-MCG per tablet TAKE 1 TABLET BY MOUTH DAILY 84 tablet 3    FLOVENT DISKUS 100 MCG/BLIST AEPB inhaler INHALE 2 PUFFS INTO THE LUNGS DAILY 60 each 2    clotrimazole-betamethasone (LOTRISONE) 1-0.05 % cream Apply topically 2 times daily. 60 g 0    pantoprazole (PROTONIX) 40 MG tablet TAKE 1 TABLET BY MOUTH TWICE DAILY 180 tablet 0    Clobetasol Propionate 0.05 % LIQD Apply 1 applicator topically daily 125 mL 1    Cannabidiol 100 MG/ML SOLN PRN anxiety      aspirin EC 81 MG EC tablet Take 1 tablet by mouth daily 30 tablet 11    blood glucose test strips (ASCENSIA AUTODISC VI;ONE TOUCH ULTRA TEST VI) strip twice a day 200 strip 3    Lancet Devices (PRODIGY LANCING DEVICE) MISC as needed 1 each 0    Blood Glucose Monitoring Suppl (PRODIGY POCKET BLOOD GLUCOSE) w/Device KIT as needed 1 kit 0    pimecrolimus (ELIDEL) 1 % cream Apply topically 2 times daily.       dicyclomine (BENTYL) 20 MG tablet TAKE 1 TABLET BY MOUTH THREE TIMES DAILY AS NEEDED FOR CRAMPING 30 tablet 5    albuterol sulfate  (90 BASE) MCG/ACT inhaler Inhale 2 puffs into the lungs every 4 hours as needed for Wheezing May substitute for insurance preferred (Ventolin, Proventil, ProAir) 1 Inhaler 3    fluticasone (FLONASE) 50 MCG/ACT nasal spray 1 spray by Nasal route daily 1 Bottle 5    loratadine (CLARITIN) 10 MG tablet Take 10 mg by mouth daily.  PRODIGY LANCETS 28G MISC 1 each by Does not apply route 2 times daily 200 each 3     No current facility-administered medications for this visit. Allergies   Allergen Reactions    Fluoxetine Other (See Comments)     Overtim, head ache    Lisinopril Other (See Comments)     cough    Lexapro [Escitalopram Oxalate] Other (See Comments)     sleepy    Methylisothiazolinone Itching and Rash    Sulfa Antibiotics Rash       Review of Systems   Constitutional: Negative for activity change and fever. Respiratory: Negative for cough and shortness of breath. Cardiovascular: Negative for chest pain. Gastrointestinal: Negative for abdominal pain. Musculoskeletal: Positive for arthralgias, back pain and myalgias. Vitals:    12/27/21 1021   BP: 122/86   Site: Right Upper Arm   Position: Sitting   Cuff Size: Large Adult   Pulse: 101   Resp: 12   SpO2: 98%   Weight: (!) 303 lb (137.4 kg)       Body mass index is 47.46 kg/m². Wt Readings from Last 3 Encounters:   12/27/21 (!) 303 lb (137.4 kg)   09/17/21 (!) 302 lb (137 kg)   08/06/21 293 lb (132.9 kg)       BP Readings from Last 3 Encounters:   12/27/21 122/86   09/17/21 126/76   08/06/21 126/80       Physical Exam  Vitals and nursing note reviewed. Constitutional:       General: She is not in acute distress. Appearance: She is well-developed. She is obese. HENT:      Head: Normocephalic and atraumatic. Cardiovascular:      Rate and Rhythm: Normal rate and regular rhythm. Pulses:           Dorsalis pedis pulses are 2+ on the right side and 2+ on the left side. Posterior tibial pulses are 2+ on the right side and 2+ on the left side.       Heart sounds: Normal heart sounds. No murmur heard. No friction rub. No gallop. Pulmonary:      Effort: Pulmonary effort is normal. No respiratory distress. Breath sounds: Normal breath sounds. Musculoskeletal:      Cervical back: Neck supple. Feet:      Right foot:      Protective Sensation: 10 sites tested. 10 sites sensed. Skin integrity: Skin integrity normal.      Toenail Condition: Right toenails are normal.      Left foot:      Protective Sensation: 10 sites tested. 10 sites sensed. Skin integrity: Skin integrity normal.      Toenail Condition: Left toenails are normal.   Skin:     General: Skin is warm and dry. Neurological:      Mental Status: She is alert and oriented to person, place, and time. Psychiatric:         Behavior: Behavior normal.         Thought Content: Thought content normal.         Judgment: Judgment normal.         Assessmentand Plan  Isabella Diamond was seen today for diabetes. Diagnoses and all orders for this visit:    Type 2 diabetes mellitus with diabetic nephropathy, without long-term current use of insulin (HCC)  -     POCT glycosylated hemoglobin (Hb A1C)- 6.5%  -     Comprehensive Metabolic Panel; Future  -     Microalbumin / Creatinine Urine Ratio; Future  -      DIABETES FOOT EXAM  Controlled   Continue current medications  Continue to work on low carb diet, aerobic exercise, and weight loss. Hyperlipidemia LDL goal <100  Lipids stable 3/2021    Obesity, Class III, BMI 40-49.9 (morbid obesity) ([facility] [address]  Continue to work on diet, aerobic exercise, and weight loss. Chronic right-sided low back pain without sciatica  -     [facility] [address]  Refer to physical therapy   To call with worsening symptoms. Need for Tdap vaccination  -     Tdap (age 10y-63y) IM (Adacel)    Return in about 3 months (around 3/27/2022), or if symptoms worsen or fail to improve, for chronic conditions, with Dr. Claudette Miner.

## 2022-01-18 ENCOUNTER — HOSPITAL ENCOUNTER (OUTPATIENT)
Dept: PHYSICAL THERAPY | Age: 47
Setting detail: THERAPIES SERIES
Discharge: HOME | End: 2022-01-18
Payer: COMMERCIAL

## 2022-01-18 PROCEDURE — 97530 THERAPEUTIC ACTIVITIES: CPT

## 2022-01-18 PROCEDURE — 97140 MANUAL THERAPY 1/> REGIONS: CPT

## 2022-01-18 PROCEDURE — 97161 PT EVAL LOW COMPLEX 20 MIN: CPT

## 2022-01-18 NOTE — FLOWSHEET NOTE
[address] [facility] [place]  Phone: [phone]   Fax:     [phone]    Physical Therapy Treatment Note/ Progress Report:     Date:  2022    Patient Name:  Starla Cheney    :  1975  MRN: [id number]    Pertinent Medical History:      Medical/Treatment Diagnosis Information:  Diagnosis: M54.50, G89.29 (ICD-10-CM) - Chronic right-sided low back pain without sciatica  Treatment Diagnosis: subacute recurrent back pain, limited mobility and function. Insurance/Certification information:     Physician Information:  Referring Practitioner: OC Beard CNP  Plan of care signed (Y/N):     Date of Patient follow up with Physician:      Progress Report: []  Yes  [x]  No     Date Range for reporting period:  Beginnin2022  Ending:    Progress report due (10 Rx/or 30 days whichever is less): 06     Recertification due (POC duration/ or 90 days whichever is less):     Visit # POC/ Insurance Allowable Auth Needed   1 UMR - $50 copay  (EOB says DN not billable) []Yes    [x]No     Functional Scale:       Date Assessed: at eval  Test: [place]  Score:     Pain level:  See eval /10     History of Injury:Pt here for c/o R sided LBP that rarely spreads to left side and also down right leg stopping at knee. She recently started using a foam wedge to elevate her head while sleeping, and she feels this has contributed to come of her back pain. The pain is mostly local to lower back area and spreading pain only occurs 1x in a few months time. She reports a history of R low back injury many years ago when weight lifting. She did recover with therapy, but symptoms were very minimal until about 6 months ago. Denies bowel/ bladder and no n/t. She has tried many stretches ex's and likes the ones that \"open up my hips. \" She has trouble putting socks on in the morning, but improves at the end of the day. She currently participates in water exercise 2x/week and yoga at home. Typically at the end of the day pain is down to a 1/10. Sitting on the couch feels good and hard chairs are not as good. She hopes to further improve back pain and learn ex's she can continue on her own to manage her back pain long-term.        SUBJECTIVE:  See eval    OBJECTIVE:    Observation:    Test measurements:      RESTRICTIONS/PRECAUTIONS:     Exercises/Interventions:     Therapeutic Ex (34729)   Min: Resistance/Reps Notes/Cues                                                Manual Intervention (63732) Min: 8'     Hip manual Post/lat mobs, LAD RLE - low/ mid grade    Lumbar manual >prn    NMR re-education (49856)   Min: 8'     Cat camel x10    Bird dog Min A w/ traction stool: 5s x 10                   Therapeutic Activity (43275) Min: 8'     Pt edu See below         Modalities  Min:             Other Therapeutic Activities:  Pt was educated on PT POC, Diagnosis, Prognosis, pathomechanics as well as frequency and duration of scheduling future physical therapy appointments. Time was also taken on this day to answer all patient questions and participation in PT. Reviewed appointment policy in detail with patient and patient verbalized understanding. Home Exercise Program:   1/18/22: bird dog 5-15s x 10-15 reps. Therapeutic Exercise and NMR EXR  [x] (93751) Provided verbal/tactile cueing for activities related to strengthening, flexibility, endurance, ROM  for improvements in proximal hip and core control with self care, mobility, lifting and ambulation. [x] (05289) Provided verbal/tactile cueing for activities related to improving balance, coordination, kinesthetic sense, posture, motor skill, proprioception  to assist with core control in self care, mobility, lifting, and ambulation.      Therapeutic Activities:    [x] (02848 or 84329) Provided verbal/tactile cueing for activities related to improving balance, coordination, kinesthetic sense, posture, motor skill, proprioception and motor activation to allow for proper function  with self care and ADLs  [x] (79304) Provided training and instruction to the patient for proper core and proximal hip recruitment and positioning with ambulation re-education     Home Exercise Program:    [x] (96899) Reviewed/Progressed HEP activities related to strengthening, flexibility, endurance, ROM of core, proximal hip and LE for functional self-care, mobility, lifting and ambulation   [x] (39459) Reviewed/Progressed HEP activities related to improving balance, coordination, kinesthetic sense, posture, motor skill, proprioception of core, proximal hip and LE for self care, mobility, lifting, and ambulation      Manual Treatments:  PROM / STM / Oscillations-Mobs:  G-I, II, III, IV (PA's, Inf., Post.)  [x] (19736) Provided manual therapy to mobilize proximal hip and LS spine soft tissue/joints for the purpose of modulating pain, promoting relaxation,  increasing ROM, reducing/eliminating soft tissue swelling/inflammation/restriction, improving soft tissue extensibility and allowing for proper ROM for normal function with self care, mobility, lifting and ambulation. Approval Dates:  CPT Code Units Approved Units Used  Date Updated:                     Charges:  Timed Code Treatment Minutes: 24   Total Treatment Minutes: 48     [x] EVAL (LOW) 82910 (typically 20 minutes face-to-face)  [] EVAL (MOD) 31851 (typically 30 minutes face-to-face)  [] EVAL (HIGH) 19058 (typically 45 minutes face-to-face)  [] RE-EVAL     [] ZF(11526) x     [] Dry needle 1 or 2 Muscles (84649)  [] NMR (13774) x     [] Dry needle 3+ Muscles (52785)  [x] Manual (60413) x     [] Ultrasound (63295) x  [x] TA (54067) x     [] Mech Traction (81406)  [] ES(attended) (52025)     [] ES (un) (54486):   [] Vasopump (30214) [] Ionto (96801)   [] Other:    GOALS:  Patient stated goal: Able to sleep w/o pain. []?  Progressing: []? Met: []? Not Met: []? Adjusted  Therapist goals for Patient:   Short Term Goals: To be achieved in: 2 weeks  1. Independent in HEP and progression per patient tolerance, in order to prevent re-injury. []? Progressing: []? Met: []? Not Met: []? Adjusted  2. Patient will have a decrease in pain to facilitate improvement in movement, function, and ADLs as indicated by Functional Deficits. []? Progressing: []? Met: []? Not Met: []? Adjusted     Long Term Goals: To be achieved in: up to 8 weeks  1. Disability index score from 32% to 15% or less for the [place] to assist with reaching prior level of function. []? Progressing: []? Met: []? Not Met: []? Adjusted  2. Patient will demonstrate increased AROM to WNL, good LS mobility, good hip ROM to allow for proper joint functioning as indicated by patients Functional Deficits. []? Progressing: []? Met: []? Not Met: []? Adjusted  3. Patient will demonstrate an increase in Strength to good proximal hip and core activation to allow for proper functional mobility as indicated by patients Functional Deficits. []? Progressing: []? Met: []? Not Met: []? Adjusted  4. Patient will be able to deadlift at least 20lbs from floor without increased symptoms or restriction to demonstrate adequate low back functional strength.    []? Progressing: []? Met: []? Not Met: []? Adjusted    ASSESSMENT:  See eval    Treatment/Activity Tolerance:  [x] Patient tolerated treatment well [] Patient limited by fatique  [] Patient limited by pain  [] Patient limited by other medical complications  [] Other:     Overall Progression Towards Functional goals/ Treatment Progress Update:  [] Patient is progressing as expected towards functional goals listed. [] Progression is slowed due to complexities/Impairments listed. [] Progression has been slowed due to co-morbidities.   [x] Plan just implemented, too soon to assess goals progression <30days   [] Goals require adjustment due to lack of progress  [] Patient is not progressing as expected and requires additional follow up with physician  [] Other:    Prognosis for POC: [x] Good [] Fair  [] Poor    Patient requires continued skilled intervention: [x] Yes  [] No        PLAN: See eval  [] Continue per plan of care [] Alter current plan (see comments)  [x] Plan of care initiated [] Hold pending MD visit [] Discharge    Electronically signed by: Anny Shirley, PT , DPT, OCS #[id number]      Note: If patient does not return for scheduled/recommended follow up visits, this note will serve as a discharge from care along with the most recent update on progress.

## 2022-01-18 NOTE — PLAN OF CARE
[facility] [place]. [address]  Phone: [phone]   Fax:     [phone]                                                       Physical Therapy Certification    Dear Referring Practitioner: OC Marie CNP,    We had the pleasure of evaluating the following patient for physical therapy services at [facility]. A summary of our findings can be found in the initial assessment below. This includes our plan of care. If you have any questions or concerns regarding these findings, please do not hesitate to contact me at the office phone number checked above. Thank you for the referral.       Physician Signature:_______________________________Date:__________________  By signing above (or electronic signature), therapists plan is approved by physician                  Patient: Eva Mcintyre   : 1975   MRN: [id number]  Referring Physician: Referring Practitioner: OC Marie CNP      Evaluation Date: 2022      Medical Diagnosis Information:  Diagnosis: M54.50, G89.29 (ICD-10-CM) - Chronic right-sided low back pain without sciatica                                             Insurance information:       Precautions/ Contra-indications:   Latex Allergy:  [x]NO      []YES  Preferred Language for Healthcare:   [x]English       []other:    C-SSRS Triggered by Intake questionnaire (Past 2 wk assessment ):   [x] No, Questionnaire did not trigger screening.   [] Yes, Patient intake triggered C-SSRS Screening      [] C-SSRS Screening completed  [] PCP notified via Epic     SUBJECTIVE: Patient stated complaint: Pt here for c/o R sided LBP that rarely spreads to left side and also down right leg stopping at knee.  She recently started using a foam wedge to elevate her head while sleeping, and she feels this has contributed to come of her back pain. The pain is mostly local to lower back area and spreading pain only occurs 1x in a few months time. She reports a history of R low back injury many years ago when weight lifting. She did recover with therapy, but symptoms were very minimal until about 6 months ago. Denies bowel/ bladder and no n/t. She has tried many stretches ex's and likes the ones that \"open up my hips. \" She has trouble putting socks on in the morning, but improves at the end of the day. She currently participates in water exercise 2x/week and yoga at home. Typically at the end of the day pain is down to a 1/10. Sitting on the couch feels good and hard chairs are not as good. She hopes to further improve back pain and learn ex's she can continue on her own to manage her back pain long-term. Relevant Medical History:   Functional Outcome Measure: Phan = 32%    Pain Scale: 0-7/10  Easing factors: exercises/ stretches  Provocative factors: long periods of static standing/ sitting or heavy lifting. Type: []Constant   [x]Intermittent  [x]Radiating []Localized []other:     Numbness/Tingling: denies    Occupation/School: n/r    Living Status/Prior Level of Function: Independent with ADLs and IADLs,    OBJECTIVE:   Posture: obesity, mild hyperlordosis    Functional Mobility/Transfers: sore with bed mobility    Palpation: mild/ mod soreness with PA to low lumbar levels R and L around L5. No tenderness at other levels. Gait: (include devices/WB status) WNL    Bandages/Dressings/Incisions: NA    Repeated Movements: Flexion: initially painful, but better after first rep. Extension feels good.      ROM  Comments   Lumbar Flex WNL - hands to floor Initial pain, but better in subsequent reps   Lumbar Ext Mild limit, no pain      ROM LEFT RIGHT Comments   [facility]    Lumbar Rotation WFL, mild sore/ tight lower R lumbar WFL    Quadrant WFL - mild sore R lumbar WFL    Hip FADDER WFL Mild deficit    Hip Abd      Hip ER      Hip IR      Hip Extension - (per bird dog visual assessment) WFL Slight deficit    Knee Ext WNL WNL    Knee Flex      Hamstring Flex WFL Mild deficit    Piriformis      Albert test                Myotomes/Strength Normal Abnormal Comments   []ALL NORMAL      Hip Abd      Hip Ext      Hip flexion (L1-L2 femoral) [] []    Knee extension (L2-L4 femoral) [x] []    Knee flexion (S1 sciatic) x     Dorsiflexion (L4-L5 deep peroneal) [] []    Great Toe Ext (L5 deep peroneal nerve) [] []    Ankle Eversion (S1-S2 super peroneal) [] []    Ankle PF(S1-S2 tibial) [] []    Multifidus [] []    Transverse Ab [] []      Dermatomes Normal Abnormal Comments   [x]ALL NORMAL            inguinal area (L1)  [] []    anterior mid-thigh (L2) [] []    distal ant thigh/med knee (L3) [] []    medial lower leg and foot (L4) [] []    lateral lower leg and foot (L5) [] []    posterior calf (S1) [] []    medial calcaneus (S2) [] []      Reflexes Normal Abnormal Comments   [x]ALL NORMAL            S1-2 Seated achilles [] []    S1-2 Prone knee bend [] []    L3-4 Patellar tendon [x] [] 3+ bilateral   C5-6 Biceps [] []    C6 Brachioradialis [] []    C7-8 Triceps [] []    Clonus [] []    Babinski [] []    Haas's [] []      Joint mobility: mild hypo R hip post/ lat   []Normal    []Hypo   []Hyper    Neurodynamics:     Orthopedic Special Tests:    Neural dynamic tension testing Normal Abnormal Comments   Slump Test  - Degree of knee flexion:  [] []    SLR  [x] []    0-30 [x] []    30-70 [] []    Femoral nerve (L2-4) [] []       Normal Abnormal N/A Comments   Fwd Bend-aberrant or innominate mvmt) [] [x] [] Significant gowers sign   Trendelenburg [] [] []    Kemps/Quadrant [] [] []    Elease Lively [] [] []    TATO/Michael [x] [] []    Hip scour [x] [] []    Supine to sit [] [] []    Prone knee bend [] [] []           Hip thrust [] [] []    SI distraction/compression [] [] []    [place]/thrust [] [] []               [x] Patient history, allergies, meds reviewed. Medical chart reviewed. See intake form. Review Of Systems (ROS):  [x]Performed Review of systems (Integumentary, CardioPulmonary, Neurological) by intake and observation. Intake form has been scanned into medical record. Patient has been instructed to contact their primary care physician regarding ROS issues if not already being addressed at this time. Co-morbidities/Complexities (which will affect course of rehabilitation):  has a past medical history of Abnormal Pap smear- LGSIL 2001, Cholinergic urticaria, Chronic folliculitis, Colon polyp, Dysfunctional uterine bleeding, False positive serology for HIV, H/O colonoscopy- done 4/12/2018 polyp, repeat 5 years, Obstructive apnea, Seborrhea, and Urticaria, idiopathic.     []None           Arthritic conditions   []Rheumatoid arthritis (M05.9)  []Osteoarthritis (M19.91)   Cardiovascular conditions   []Hypertension (I10)  []Hyperlipidemia (E78.5)  []Angina pectoris (I20)  []Atherosclerosis (I70)  []CVA Musculoskeletal conditions   []Disc pathology   []Congenital spine pathologies   []Prior surgical intervention  []Osteoporosis (M81.8)  []Osteopenia (M85.8)   Endocrine conditions   []Hypothyroid (E03.9)  []Hyperthyroid Gastrointestinal conditions   []Constipation (E14.97)   Metabolic conditions   [x]Morbid obesity (E66.01)  [x]Diabetes type 1(E10.65) or 2 (E11.65)   []Neuropathy (G60.9)     Pulmonary conditions   []Asthma (J45)  []Coughing   []COPD (J44.9)   Psychological Disorders  []Anxiety (F41.9)  []Depression (F32.9)   []Other:   [x]Other:   See above        Barriers to/and or personal factors that will affect rehab potential:              []Age  []Sex    []Smoker              []Motivation/Lack of Motivation                        [x]Co-Morbidities              []Cognitive Function, education/learning barriers              []Environmental, home barriers              []profession/work barriers  []past PT/medical experience  []other:  Justification: Falls Risk Assessment (30 days):   [x] Falls Risk assessed and no intervention required. [] Falls Risk assessed and Patient requires intervention due to being higher risk   TUG score (>12s at risk):     [] Falls education provided, including:         ASSESSMENT:   Functional Impairments:     [x]Noted lumbar/proximal hip hypomobility   []Noted lumbosacral and/or generalized hypermobility   [x]Decreased Lumbosacral/hip/LE functional ROM   [x]Decreased core/proximal hip strength and neuromuscular control    [x]Decreased LE functional strength    []Abnormal reflexes/sensation/myotomal/dermatomal deficits  [x]Reduced balance/proprioceptive control    []other:      Functional Activity Limitations (from functional questionnaire and intake)   []Reduced ability to tolerate prolonged functional positions   [x]Reduced ability or difficulty with changes of positions or transfers between positions   []Reduced ability to maintain good posture and demonstrate good body mechanics with sitting, bending, and lifting   [x]Reduced ability to sleep   [] Reduced ability or tolerance with driving and/or computer work   [x]Reduced ability to perform lifting, reaching, carrying tasks   [x]Reduced ability to squat   [x]Reduced ability to forward bend   [x]Reduced ability to ambulate prolonged functional periods/distances/surfaces   []Reduced ability to ascend/descend stairs   []other:       Participation Restrictions   [x]Reduced participation in self care activities   [x]Reduced participation in home management activities   [x]Reduced participation in work activities   [x]Reduced participation in social activities. []Reduced participation in sport/recreational activities. Classification:   [x]Signs/symptoms consistent with Lumbar instability/stabilization subgroup. []Signs/symptoms consistent with Lumbar mobilization/manipulation subgroup, myotomes and dermatomes intact. Meets manipulation criteria.     [x]Signs/symptoms consistent with Lumbar direction specific/centralization subgroup   []Signs/symptoms consistent with Lumbar traction subgroup       [x]Signs/symptoms consistent with lumbar facet dysfunction   []Signs/symptoms consistent with lumbar stenosis type dysfunction   []Signs/symptoms consistent with nerve root involvement including myotome & dermatome dysfunction   []Signs/symptoms consistent with post-surgical status including: decreased ROM, strength and function. [x]signs/symptoms consistent with pathology which may benefit from Dry needling     []other:      Prognosis/Rehab Potential:      [x]Excellent   []Good    []Fair   []Poor    Tolerance of evaluation/treatment:    []Excellent   [x]Good    []Fair   []Poor     Physical Therapy Evaluation Complexity Justification  [x] A history of present problem with:  [] no personal factors and/or comorbidities that impact the plan of care;  [x]1-2 personal factors and/or comorbidities that impact the plan of care  []3 personal factors and/or comorbidities that impact the plan of care  [x] An examination of body systems using standardized tests and measures addressing any of the following: body structures and functions (impairments), activity limitations, and/or participation restrictions;:  [x] a total of 1-2 or more elements   [] a total of 3 or more elements   [] a total of 4 or more elements   [x] A clinical presentation with:  [x] stable and/or uncomplicated characteristics   [] evolving clinical presentation with changing characteristics  [] unstable and unpredictable characteristics;   [x] Clinical decision making of [] low, [] moderate, [] high complexity using standardized patient assessment instrument and/or measurable assessment of functional outcome.     [x] EVAL (LOW) 97023 (typically 20 minutes face-to-face)  [] EVAL (MOD) 08850 (typically 30 minutes face-to-face)  [] EVAL (HIGH) 78590 (typically 45 minutes face-to-face)  [] RE-EVAL     PLAN: Begin PT focusing on: proximal hip mobilizations, LB mobs, LB core activation, proximal hip activation, and HEP    Frequency/Duration:  Up to 2 days per week for up to 6-8 Weeks: Initially we will start with 1x/wk with heavy emphasis on home program.   Interventions:  [x]  Therapeutic exercise including: strength training, ROM, for LE, Glutes and core   [x]  NMR activation and proprioception for glutes , LE and Core   [x]  Manual therapy as indicated for Hip complex, LE and spine to include: Dry Needling/IASTM, STM, PROM, Gr I-IV mobilizations, manipulation. [x]  Modalities as needed that may include: thermal agents, E-stim, Biofeedback, US, iontophoresis as indicated  [x]  Patient education on joint protection, postural re-education, activity modification, progression of HEP. HEP instruction: bird dog w/ min assist     GOALS:  Patient stated goal: Able to sleep w/o pain. [] Progressing: [] Met: [] Not Met: [] Adjusted  Therapist goals for Patient:   Short Term Goals: To be achieved in: 2 weeks  1. Independent in HEP and progression per patient tolerance, in order to prevent re-injury. [] Progressing: [] Met: [] Not Met: [] Adjusted  2. Patient will have a decrease in pain to facilitate improvement in movement, function, and ADLs as indicated by Functional Deficits. [] Progressing: [] Met: [] Not Met: [] Adjusted    Long Term Goals: To be achieved in: up to 8 weeks  1. Disability index score from 32% to 15% or less for the [place] to assist with reaching prior level of function. [] Progressing: [] Met: [] Not Met: [] Adjusted  2. Patient will demonstrate increased AROM to WNL, good LS mobility, good hip ROM to allow for proper joint functioning as indicated by patients Functional Deficits. [] Progressing: [] Met: [] Not Met: [] Adjusted  3. Patient will demonstrate an increase in Strength to good proximal hip and core activation to allow for proper functional mobility as indicated by patients Functional Deficits.    [] Progressing: [] Met: [] Not Met: [] Adjusted  4. Patient will be able to deadlift at least 20lbs from floor without increased symptoms or restriction to demonstrate adequate low back functional strength. [] Progressing: [] Met: [] Not Met: [] Adjusted    Electronically signed by: Walt Bond PT , DPT, [facility] #[id number]          Note: If patient does not return for scheduled/recommended follow up visits, this note will serve as a discharge from care along with the most recent update on progress.

## 2022-01-25 ENCOUNTER — HOSPITAL ENCOUNTER (OUTPATIENT)
Dept: PHYSICAL THERAPY | Age: 47
Setting detail: THERAPIES SERIES
Discharge: HOME | End: 2022-01-25
Payer: COMMERCIAL

## 2022-01-25 PROCEDURE — 97112 NEUROMUSCULAR REEDUCATION: CPT

## 2022-01-25 PROCEDURE — 97140 MANUAL THERAPY 1/> REGIONS: CPT

## 2022-01-25 PROCEDURE — 97530 THERAPEUTIC ACTIVITIES: CPT

## 2022-01-25 NOTE — FLOWSHEET NOTE
[address] [facility] [place]. [facility], [facility] [address]  Phone: [phone]   Fax:     [phone]    Physical Therapy Treatment Note/ Progress Report:     Date:  2022    Patient Name:  Gisela Polanco    :  1975  MRN: [id number]    Pertinent Medical History:      Medical/Treatment Diagnosis Information:  Diagnosis: M54.50, G89.29 (ICD-10-CM) - Chronic right-sided low back pain without sciatica  Treatment Diagnosis: subacute recurrent back pain, limited mobility and function. Insurance/Certification information:     Physician Information:  Referring Practitioner: OC Colón CNP  Plan of care signed (Y/N):     Date of Patient follow up with Physician:      Progress Report: []  Yes  [x]  No     Date Range for reporting period:  Beginnin2022  Ending:    Progress report due (10 Rx/or 30 days whichever is less):      Recertification due (POC duration/ or 90 days whichever is less):     Visit # POC/ Insurance Allowable Auth Needed   2 UMR - $50 copay  (EOB says DN not billable) []Yes    [x]No     Functional Scale:       Date Assessed: at eval  Test: [place]  Score:     Pain level:  See below /10     History of Injury:Pt here for c/o R sided LBP that rarely spreads to left side and also down right leg stopping at knee. She recently started using a foam wedge to elevate her head while sleeping, and she feels this has contributed to come of her back pain. The pain is mostly local to lower back area and spreading pain only occurs 1x in a few months time. She reports a history of R low back injury many years ago when weight lifting. She did recover with therapy, but symptoms were very minimal until about 6 months ago. Denies bowel/ bladder and no n/t. She has tried many stretches ex's and likes the ones that \"open up my hips. \" She has trouble putting socks on in the morning, but improves at the end of the day. She currently participates in water exercise 2x/week and yoga at home. Typically at the end of the day pain is down to a 1/10. Sitting on the couch feels good and hard chairs are not as good. She hopes to further improve back pain and learn ex's she can continue on her own to manage her back pain long-term.    *addendum 1/25: hx R knee patellar dislocations    SUBJECTIVE:   1/25/22: pt states she felt sore, but different after first visit. She was cautious, so she only did a few reps of the ex's trying to avoid soreness. No recent pain radiating below her glute. Today mild/ mod R sided LB soreness which she attributes to taking her mother to hospital and sleeping on hospital bed. OBJECTIVE:    Observation:    Test measurements:      ROM 1/25/2022     Lumbar AROM WFL all planes, some sore with flexion, better after couple reps, severe pain/ difficulty w/ returning to upright. Ant hip mobility Mild limit bilat R>L                 Strength      Hip ext R=4/5  L=4+/5     Hip abd R=4-/5  L=4/5           TESTS/ OTHER      Gowers sign pos     abberant mvmt pos     Lumbar quadrant Clear R,L                     RESTRICTIONS/PRECAUTIONS:     Exercises/Interventions:     Therapeutic Ex (82495)   Min: Resistance/Reps Notes/Cues                                 Manual Intervention (22922) Min: 10'     Hip manual Sidely ant hip mob R,L, medial glide   Lumbar manual >prn    NMR re-education (64364)   Min: 15'     Cat camel    Bird dog    hookly PPT 2x8 slow   Hookly PPT + bridge 2x5 Cue segmental mobility   EOB hip ext x8 R,L                   Therapeutic Activity (10564) Min: 10'     Pt edu Reassess lumbar AROM w/OP, pt edu: treatment pathway/ rationale, update HEP: see below         Modalities  Min:             Other Therapeutic Activities:  Pt was educated on PT POC, Diagnosis, Prognosis, pathomechanics as well as frequency and duration of scheduling future physical therapy appointments.  Time was also taken on this day to answer all patient questions and participation in PT. Reviewed appointment policy in detail with patient and patient verbalized understanding. Home Exercise Program:   1/18/22: bird dog 5-15s x 10-15 reps. 1/25/22: hookly PPT, PPT+bridge, EOB hip ext      Therapeutic Exercise and NMR EXR  [x] (49657) Provided verbal/tactile cueing for activities related to strengthening, flexibility, endurance, ROM  for improvements in proximal hip and core control with self care, mobility, lifting and ambulation. [x] (22650) Provided verbal/tactile cueing for activities related to improving balance, coordination, kinesthetic sense, posture, motor skill, proprioception  to assist with core control in self care, mobility, lifting, and ambulation.      Therapeutic Activities:    [x] (69318 or 97288) Provided verbal/tactile cueing for activities related to improving balance, coordination, kinesthetic sense, posture, motor skill, proprioception and motor activation to allow for proper function  with self care and ADLs  [x] (29013) Provided training and instruction to the patient for proper core and proximal hip recruitment and positioning with ambulation re-education     Home Exercise Program:    [x] (54535) Reviewed/Progressed HEP activities related to strengthening, flexibility, endurance, ROM of core, proximal hip and LE for functional self-care, mobility, lifting and ambulation   [x] (57730) Reviewed/Progressed HEP activities related to improving balance, coordination, kinesthetic sense, posture, motor skill, proprioception of core, proximal hip and LE for self care, mobility, lifting, and ambulation      Manual Treatments:  PROM / STM / Oscillations-Mobs:  G-I, II, III, IV (PA's, Inf., Post.)  [x] (15218) Provided manual therapy to mobilize proximal hip and LS spine soft tissue/joints for the purpose of modulating pain, promoting relaxation,  increasing ROM, reducing/eliminating soft tissue swelling/inflammation/restriction, improving soft tissue extensibility and allowing for proper ROM for normal function with self care, mobility, lifting and ambulation. Approval Dates:  CPT Code Units Approved Units Used  Date Updated:                     Charges:  Timed Code Treatment Minutes: 35   Total Treatment Minutes: 35     [] EVAL (LOW) 79604 (typically 20 minutes face-to-face)  [] EVAL (MOD) 72849 (typically 30 minutes face-to-face)  [] EVAL (HIGH) 60974 (typically 45 minutes face-to-face)  [] RE-EVAL     [] KU(00349) x     [] Dry needle 1 or 2 Muscles (24607)  [x] NMR (92444) x     [] Dry needle 3+ Muscles (16072)  [] Manual (38853) x     [] Ultrasound (96908) x  [x] TA (32199) x     [] Mech Traction (25814)  [] ES(attended) (26880)     [] ES (un) (35395):   [] Vasopump (40970) [] Ionto (99930)   [] Other:    GOALS:  Patient stated goal: Able to sleep w/o pain. []? Progressing: []? Met: []? Not Met: []? Adjusted  Therapist goals for Patient:   Short Term Goals: To be achieved in: 2 weeks  1. Independent in HEP and progression per patient tolerance, in order to prevent re-injury. []? Progressing: []? Met: []? Not Met: []? Adjusted  2. Patient will have a decrease in pain to facilitate improvement in movement, function, and ADLs as indicated by Functional Deficits. []? Progressing: []? Met: []? Not Met: []? Adjusted     Long Term Goals: To be achieved in: up to 8 weeks  1. Disability index score from 32% to 15% or less for the [place] to assist with reaching prior level of function. []? Progressing: []? Met: []? Not Met: []? Adjusted  2. Patient will demonstrate increased AROM to WNL, good LS mobility, good hip ROM to allow for proper joint functioning as indicated by patients Functional Deficits. []? Progressing: []? Met: []? Not Met: []? Adjusted  3.  Patient will demonstrate an increase in Strength to good proximal hip and core activation to allow for proper functional mobility as indicated by patients Functional Deficits. []? Progressing: []? Met: []? Not Met: []? Adjusted  4. Patient will be able to deadlift at least 20lbs from floor without increased symptoms or restriction to demonstrate adequate low back functional strength.    []? Progressing: []? Met: []? Not Met: []? Adjusted    ASSESSMENT:  Seemed to respond well to treatment today. Significant improvement in gait and pain at end of session. Updated home program with cont'd incorporation of lumbar stab work, but also mobility work for lower lumbar levels. Recommended she reduce amount of repetitive movements (lateral pelvic tilting) that cause pain, but she finds herself doing them often because \"I feel like I need to do them. \"    Patient requires skilled physical therapy to facilitate restoration of mobility, strength, and function and to be able to return to desired level of function including; community ambulation, ADL's, and regular home exercise program w/o pain. .      Treatment/Activity Tolerance:  [x] Patient tolerated treatment well [] Patient limited by fatique  [] Patient limited by pain  [] Patient limited by other medical complications  [] Other:     Overall Progression Towards Functional goals/ Treatment Progress Update:  [] Patient is progressing as expected towards functional goals listed. [] Progression is slowed due to complexities/Impairments listed. [] Progression has been slowed due to co-morbidities.   [x] Plan just implemented, too soon to assess goals progression <30days   [] Goals require adjustment due to lack of progress  [] Patient is not progressing as expected and requires additional follow up with physician  [] Other:    Prognosis for POC: [x] Good [] Fair  [] Poor    Patient requires continued skilled intervention: [x] Yes  [] No        PLAN: See eval  [] Continue per plan of care [] Alter current plan (see comments)  [x] Plan of care initiated [] Hold pending MD visit [] Discharge    Electronically signed by:

## 2022-02-04 ENCOUNTER — APPOINTMENT (OUTPATIENT)
Dept: PHYSICAL THERAPY | Age: 47
End: 2022-02-04
Payer: COMMERCIAL

## 2022-02-08 ENCOUNTER — HOSPITAL ENCOUNTER (OUTPATIENT)
Dept: PHYSICAL THERAPY | Age: 47
Setting detail: THERAPIES SERIES
Discharge: HOME | End: 2022-02-08
Payer: COMMERCIAL

## 2022-02-08 PROCEDURE — 97112 NEUROMUSCULAR REEDUCATION: CPT

## 2022-02-08 PROCEDURE — 97110 THERAPEUTIC EXERCISES: CPT

## 2022-02-08 PROCEDURE — 97140 MANUAL THERAPY 1/> REGIONS: CPT

## 2022-02-08 NOTE — FLOWSHEET NOTE
[address]  Phone: [phone]   Fax:     [phone]    Physical Therapy Treatment Note/ Progress Report:     Date:  2022    Patient Name:  Naye Chawla    :  1975  MRN: [id number]    Pertinent Medical History:      Medical/Treatment Diagnosis Information:  Diagnosis: M54.50, G89.29 (ICD-10-CM) - Chronic right-sided low back pain without sciatica  Treatment Diagnosis: subacute recurrent back pain, limited mobility and function. Insurance/Certification information:     Physician Information:  Referring Practitioner: OC Ansari CNP  Plan of care signed (Y/N):     Date of Patient follow up with Physician:      Progress Report: []  Yes  [x]  No     Date Range for reporting period:  Beginnin2022  Ending:    Progress report due (10 Rx/or 30 days whichever is less):      Recertification due (POC duration/ or 90 days whichever is less):     Visit # POC/ Insurance Allowable Auth Needed   3 UMR - $50 copay  (EOB says DN not billable) []Yes    [x]No     Functional Scale:       Date Assessed: at eval  Test: Melodystkallie  Score:     Pain level:  See below /10     History of Injury:Pt here for c/o R sided LBP that rarely spreads to left side and also down right leg stopping at knee. She recently started using a foam wedge to elevate her head while sleeping, and she feels this has contributed to come of her back pain. The pain is mostly local to lower back area and spreading pain only occurs 1x in a few months time. She reports a history of R low back injury many years ago when weight lifting. She did recover with therapy, but symptoms were very minimal until about 6 months ago. Denies bowel/ bladder and no n/t. She has tried many stretches ex's and likes the ones that \"open up my hips. \" She has trouble putting socks on in the morning, but improves at the end of the day. She currently participates in water exercise 2x/week and yoga at home. Typically at the end of the day pain is down to a 1/10. Sitting on the couch feels good and hard chairs are not as good. She hopes to further improve back pain and learn ex's she can continue on her own to manage her back pain long-term.    *addendum 1/25: hx R knee patellar dislocations    SUBJECTIVE:   1/25/22: pt states she felt sore, but different after first visit. She was cautious, so she only did a few reps of the ex's trying to avoid soreness. No recent pain radiating below her glute. Today mild/ mod R sided LB soreness which she attributes to taking her mother to hospital and sleeping on hospital bed. 2/8/22: pt reports about 50% improvement. Pain used to be 5/10 at most, now about 3/10 at most. Most often occurs when transitioning to standing from sitting (especially after rising from being on commode). Pain is fairly focal to lower back region, does not radiate down leg. OBJECTIVE:    Observation:    Test measurements:      ROM 1/25/2022 2/8/22    Lumbar AROM WFL all planes, some sore with flexion, better after couple reps, severe pain/ difficulty w/ returning to upright.       Ant hip mobility Mild limit bilat R>L                 Strength      Hip ext R=4/5  L=4+/5     Hip abd R=4-/5  L=4/5           TESTS/ OTHER      Gowers sign pos pos after 3 reps    abberant mvmt pos     Lumbar quadrant Clear R,L     SLR  Mild restriction on R              RESTRICTIONS/PRECAUTIONS:     Exercises/Interventions:     Therapeutic Ex (70724)   Min: 8' Resistance/Reps Notes/Cues   Free squat @ counter: x6, depth per kae Mild/ mod SOB/ fatigue        SL lumbar rot str x6 ea R,L                    Manual Intervention (94129) Min: 12'     Hip manual    Lumbar manual Prone low grade PA's, low grade RLE LAD, SL rot guided str    NMR re-education (26269)   Min: 15'     Cat camel X8    Merced pose 5s x 5   Bird dog    hookly PPT slow Hookly PPT + bridge Cue segmental mobility   EOB hip ext 2x5 R,L w/blue @ knees w/ opp UE                   Therapeutic Activity (97735) Min: 6'     Pt edu Review/ update HEP, goals of PT, functional goals         Modalities  Min:             Other Therapeutic Activities:  Pt was educated on PT POC, Diagnosis, Prognosis, pathomechanics as well as frequency and duration of scheduling future physical therapy appointments. Time was also taken on this day to answer all patient questions and participation in PT. Reviewed appointment policy in detail with patient and patient verbalized understanding. Home Exercise Program:   1/18/22: bird dog 5-15s x 10-15 reps. 1/25/22: hookly PPT, PPT+bridge, EOB hip ext  2/8/22: add blue band to EOB hip ext, free squat, SL Rot      Therapeutic Exercise and NMR EXR  [x] (29198) Provided verbal/tactile cueing for activities related to strengthening, flexibility, endurance, ROM  for improvements in proximal hip and core control with self care, mobility, lifting and ambulation. [x] (50031) Provided verbal/tactile cueing for activities related to improving balance, coordination, kinesthetic sense, posture, motor skill, proprioception  to assist with core control in self care, mobility, lifting, and ambulation.      Therapeutic Activities:    [x] (75226 or 56881) Provided verbal/tactile cueing for activities related to improving balance, coordination, kinesthetic sense, posture, motor skill, proprioception and motor activation to allow for proper function  with self care and ADLs  [x] (37536) Provided training and instruction to the patient for proper core and proximal hip recruitment and positioning with ambulation re-education     Home Exercise Program:    [x] (07033) Reviewed/Progressed HEP activities related to strengthening, flexibility, endurance, ROM of core, proximal hip and LE for functional self-care, mobility, lifting and ambulation   [x] (58408) Reviewed/Progressed HEP for the Phan to assist with reaching prior level of function. []? Progressing: []? Met: []? Not Met: []? Adjusted  2. Patient will demonstrate increased AROM to WNL, good LS mobility, good hip ROM to allow for proper joint functioning as indicated by patients Functional Deficits. []? Progressing: []? Met: []? Not Met: []? Adjusted  3. Patient will demonstrate an increase in Strength to good proximal hip and core activation to allow for proper functional mobility as indicated by patients Functional Deficits. []? Progressing: []? Met: []? Not Met: []? Adjusted  4. Patient will be able to deadlift at least 20lbs from floor without increased symptoms or restriction to demonstrate adequate low back functional strength.    []? Progressing: []? Met: []? Not Met: []? Adjusted    ASSESSMENT:  Seems to be on the right track with moderate improvement. Still somewhat positive gowers sign, but better than first visit. Pt states she is fearful of straining her back too much so she avoid bending/ lifting. We had a discussion that part of our goal is to further improve her functional strength with lower back and lower quarter strength to decrease flare ups. Patient requires skilled physical therapy to facilitate restoration of mobility, strength, and function and to be able to return to desired level of function including; community ambulation, ADL's, and regular home exercise program w/o pain. .      Treatment/Activity Tolerance:  [x] Patient tolerated treatment well [] Patient limited by fatique  [] Patient limited by pain  [] Patient limited by other medical complications  [] Other:     Overall Progression Towards Functional goals/ Treatment Progress Update:  [] Patient is progressing as expected towards functional goals listed. [] Progression is slowed due to complexities/Impairments listed. [] Progression has been slowed due to co-morbidities.   [x] Plan just implemented, too soon to assess goals progression <30days   [] Goals require adjustment due to lack of progress  [] Patient is not progressing as expected and requires additional follow up with physician  [] Other:    Prognosis for POC: [x] Good [] Fair  [] Poor    Patient requires continued skilled intervention: [x] Yes  [] No        PLAN: See eval  [] Continue per plan of care [] Alter current plan (see comments)  [x] Plan of care initiated [] Hold pending MD visit [] Discharge    Electronically signed by: Em Mock PT , DPT, [facility] #[id number]      Note: If patient does not return for scheduled/recommended follow up visits, this note will serve as a discharge from care along with the most recent update on progress.

## 2022-02-15 ENCOUNTER — HOSPITAL ENCOUNTER (OUTPATIENT)
Dept: PHYSICAL THERAPY | Age: 47
Setting detail: THERAPIES SERIES
Discharge: HOME | End: 2022-02-15
Payer: COMMERCIAL

## 2022-02-15 NOTE — FLOWSHEET NOTE
[facility] [address]  Phone: [phone]   Fax:     [phone]       [place] [address]. [facility]  Phone: [phone]   Fax:     [phone]     Physical Therapy Update Summary    Dear  Dr Ginger Ramos,   We had the pleasure of treating the following patient for physical therapy services at [address] [place] [address]. A summary of our findings can be found in the discharge summary below. If you have any questions or concerns regarding these findings, please do not hesitate to contact me at the office phone number above. Thank you for the referral.     Physician Signature:________________________________Date:__________________  By signing above (or electronic signature), therapists plan is approved by physician      Overall Response to Treatment:   [x]Patient is responding well to treatment and improvement is noted with regards  to goals   []Patient should continue to improve in reasonable time if they continue HEP   []Patient has plateaued and is no longer responding to skilled PT intervention    []Patient is getting worse and would benefit from return to referring MD   []Patient unable to adhere to initial POC   []Other:     Date range of Visits: 22 to 22  Total Visits: 3      Physical Therapy  Cancellation/No-show Note  Patient Name:  Zora Ford  :  1975   Date:  2/15/2022  Cancelled visits to date: 1  No-shows to date: 0    Patient status for today's appointment patient:  [x]  Cancelled  []  Rescheduled appointment  []  No-show     Reason given by patient:  []  Patient ill  []  Conflicting appointment  []  No transportation    []  Conflict with work  []  No reason given  [x]  Other:     Comments:   Pt very happy with progress from PT after just 3 visits ( to ). Currently having GI issues and cancelled todays visit. Declined to schedule more visits at this time. She will cont w/ HEP and only schedule more PT visits if she is not 100% happy after a few more weeks. Phone call information:   []  Phone call made today to patient at _ time at number provided:      []  Patient answered, conversation as follows:    []  Patient did not answer, message left as follows:  []  Phone call not made today    Electronically signed by:   Tyrel Landaverde, PT , DPT, [facility] #[id number]

## 2022-03-23 DIAGNOSIS — E11.21 TYPE 2 DIABETES MELLITUS WITH DIABETIC NEPHROPATHY, WITHOUT LONG-TERM CURRENT USE OF INSULIN (HCC): ICD-10-CM

## 2022-03-23 RX ORDER — DESOGESTREL AND ETHINYL ESTRADIOL 0.15-0.03
KIT ORAL
Qty: 84 TABLET | Refills: 3 | Status: SHIPPED | OUTPATIENT
Start: 2022-03-23 | End: 2022-06-21 | Stop reason: ALTCHOICE

## 2022-03-23 RX ORDER — DULAGLUTIDE 1.5 MG/.5ML
INJECTION, SOLUTION SUBCUTANEOUS
Qty: 2 ML | Refills: 3 | Status: SHIPPED | OUTPATIENT
Start: 2022-03-23 | End: 2022-08-10

## 2022-03-25 DIAGNOSIS — F43.22 ADJUSTMENT DISORDER WITH ANXIOUS MOOD: ICD-10-CM

## 2022-03-25 DIAGNOSIS — E11.21 DIABETIC NEPHROPATHY WITH PROTEINURIA (HCC): ICD-10-CM

## 2022-03-25 DIAGNOSIS — L50.5 CHOLINERGIC URTICARIA: ICD-10-CM

## 2022-03-25 RX ORDER — MONTELUKAST SODIUM 10 MG/1
TABLET ORAL
Qty: 30 TABLET | Refills: 5 | Status: SHIPPED | OUTPATIENT
Start: 2022-03-25 | End: 2022-07-22 | Stop reason: SDUPTHER

## 2022-03-25 RX ORDER — LOSARTAN POTASSIUM 25 MG/1
TABLET ORAL
Qty: 30 TABLET | Refills: 5 | Status: SHIPPED | OUTPATIENT
Start: 2022-03-25 | End: 2022-07-22 | Stop reason: SDUPTHER

## 2022-04-06 ENCOUNTER — OFFICE VISIT (OUTPATIENT)
Dept: FAMILY MEDICINE CLINIC | Age: 47
End: 2022-04-06
Payer: COMMERCIAL

## 2022-04-06 VITALS
DIASTOLIC BLOOD PRESSURE: 86 MMHG | SYSTOLIC BLOOD PRESSURE: 130 MMHG | OXYGEN SATURATION: 98 % | BODY MASS INDEX: 45.99 KG/M2 | WEIGHT: 293 LBS | HEIGHT: 67 IN | HEART RATE: 102 BPM

## 2022-04-06 DIAGNOSIS — N93.8 DUB (DYSFUNCTIONAL UTERINE BLEEDING): ICD-10-CM

## 2022-04-06 DIAGNOSIS — Z82.0 FAMILY HISTORY OF ALZHEIMER'S DISEASE: ICD-10-CM

## 2022-04-06 DIAGNOSIS — E66.01 OBESITY, CLASS III, BMI 40-49.9 (MORBID OBESITY) (HCC): Chronic | ICD-10-CM

## 2022-04-06 DIAGNOSIS — E78.5 HYPERLIPIDEMIA LDL GOAL <100: ICD-10-CM

## 2022-04-06 DIAGNOSIS — R41.3 MEMORY PROBLEM: ICD-10-CM

## 2022-04-06 DIAGNOSIS — K58.0 IRRITABLE BOWEL SYNDROME WITH DIARRHEA: ICD-10-CM

## 2022-04-06 DIAGNOSIS — E11.21 TYPE 2 DIABETES MELLITUS WITH DIABETIC NEPHROPATHY, WITHOUT LONG-TERM CURRENT USE OF INSULIN (HCC): Primary | ICD-10-CM

## 2022-04-06 DIAGNOSIS — F41.9 ANXIETY: ICD-10-CM

## 2022-04-06 DIAGNOSIS — E11.21 TYPE 2 DIABETES MELLITUS WITH DIABETIC NEPHROPATHY, WITHOUT LONG-TERM CURRENT USE OF INSULIN (HCC): ICD-10-CM

## 2022-04-06 LAB
BASOPHILS ABSOLUTE: 0 K/UL (ref 0–0.2)
BASOPHILS RELATIVE PERCENT: 0.2 %
CHOLESTEROL, TOTAL: 189 MG/DL (ref 0–199)
EOSINOPHILS ABSOLUTE: 0.1 K/UL (ref 0–0.6)
EOSINOPHILS RELATIVE PERCENT: 1 %
HBA1C MFR BLD: 6.7 %
HCT VFR BLD CALC: 36.4 % (ref 36–48)
HDLC SERPL-MCNC: 69 MG/DL (ref 40–60)
HEMOGLOBIN: 11.7 G/DL (ref 12–16)
LDL CHOLESTEROL CALCULATED: 79 MG/DL
LYMPHOCYTES ABSOLUTE: 2.4 K/UL (ref 1–5.1)
LYMPHOCYTES RELATIVE PERCENT: 22.3 %
MCH RBC QN AUTO: 25.1 PG (ref 26–34)
MCHC RBC AUTO-ENTMCNC: 32.3 G/DL (ref 31–36)
MCV RBC AUTO: 77.8 FL (ref 80–100)
MONOCYTES ABSOLUTE: 0.6 K/UL (ref 0–1.3)
MONOCYTES RELATIVE PERCENT: 5.6 %
NEUTROPHILS ABSOLUTE: 7.6 K/UL (ref 1.7–7.7)
NEUTROPHILS RELATIVE PERCENT: 70.9 %
PDW BLD-RTO: 18.2 % (ref 12.4–15.4)
PLATELET # BLD: 283 K/UL (ref 135–450)
PMV BLD AUTO: 8.8 FL (ref 5–10.5)
RBC # BLD: 4.67 M/UL (ref 4–5.2)
TRIGL SERPL-MCNC: 205 MG/DL (ref 0–150)
TSH SERPL DL<=0.05 MIU/L-ACNC: 3.05 UIU/ML (ref 0.27–4.2)
VLDLC SERPL CALC-MCNC: 41 MG/DL
WBC # BLD: 10.7 K/UL (ref 4–11)

## 2022-04-06 PROCEDURE — 3044F HG A1C LEVEL LT 7.0%: CPT | Performed by: FAMILY MEDICINE

## 2022-04-06 PROCEDURE — 83036 HEMOGLOBIN GLYCOSYLATED A1C: CPT | Performed by: FAMILY MEDICINE

## 2022-04-06 PROCEDURE — 99214 OFFICE O/P EST MOD 30 MIN: CPT | Performed by: FAMILY MEDICINE

## 2022-04-06 RX ORDER — NORETHINDRONE AND ETHINYL ESTRADIOL 7 DAYS X 3
1 KIT ORAL DAILY
Qty: 3 PACKET | Refills: 3 | Status: SHIPPED | OUTPATIENT
Start: 2022-04-06

## 2022-04-06 ASSESSMENT — PATIENT HEALTH QUESTIONNAIRE - PHQ9
SUM OF ALL RESPONSES TO PHQ QUESTIONS 1-9: 0
SUM OF ALL RESPONSES TO PHQ9 QUESTIONS 1 & 2: 0
1. LITTLE INTEREST OR PLEASURE IN DOING THINGS: 0
SUM OF ALL RESPONSES TO PHQ QUESTIONS 1-9: 0
2. FEELING DOWN, DEPRESSED OR HOPELESS: 0

## 2022-04-06 NOTE — PROGRESS NOTES
CHRONIC CONDITION FOLOW-UP    Subjective:      Chief Complaint   Patient presents with    Diabetes     DM check     Gabby Angel is an 42 y.o. female who presents for follow up of following chronic problems:  1. Type 2 diabetes mellitus with diabetic nephropathy, without long-term current use of insulin ([facility])    2. Obesity, Class III, BMI 40-49.9 (morbid obesity) ([address]    3. Hyperlipidemia LDL goal <100    4. Anxiety - doing better as HOP is improved; good on SSRI   5. Irritable bowel syndrome with diarrhea - flares at times, saw GI, taking one bentyl daily     Complaints: menses usually 4-5 d. Last time spotted for another 2-3 days 2 d later. Sometimes spots mid cycle. 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 Dad had early onset dementia. Trouble focusing. · - taking medicine, no issues. CHART REVIEW   reports that she has never smoked. She has never used smokeless tobacco.  Health Maintenance Due   Topic Date Due    Diabetic retinal exam  12/18/2021    Depression Screen  03/17/2022    Lipid screen  03/30/2022     Current Outpatient Medications   Medication Instructions    albuterol sulfate  (90 BASE) MCG/ACT inhaler 2 puffs, Inhalation, EVERY 4 HOURS PRN, May substitute for insurance preferred (Ventolin, Proventil, ProAir)    aspirin EC 81 mg, Oral, DAILY    azelastine (ASTELIN) 0.1 % nasal spray 1 spray, Nasal, 2 TIMES DAILY, Use in each nostril as directed     Blood Glucose Monitoring Suppl (PRODIGY POCKET BLOOD GLUCOSE) w/Device KIT as needed    blood glucose test strips (ASCENSIA AUTODISC VI;ONE TOUCH ULTRA TEST VI) strip twice a day    Cannabidiol 100 MG/ML SOLN PRN anxiety    clobetasol (TEMOVATE) 0.05 % external solution Apply topically 2 times daily.  Clobetasol Propionate 1.54 % LIQD 1 applicator, Apply externally, DAILY    clotrimazole-betamethasone (LOTRISONE) 1-0.05 % cream Apply topically 2 times daily.     desonide (DESOWEN) 0.05 % cream Apply topically 2 times daily.  dicyclomine (BENTYL) 20 MG tablet TAKE 1 TABLET BY MOUTH THREE TIMES DAILY AS NEEDED FOR CRAMPING    FLOVENT DISKUS 100 MCG/BLIST AEPB inhaler INHALE 2 PUFFS INTO THE LUNGS DAILY    fluticasone (FLONASE) 50 MCG/ACT nasal spray 1 spray, Nasal, DAILY    ISIBLOOM 0.15-30 MG-MCG per tablet TAKE 1 TABLET BY MOUTH DAILY    Lancet Devices (PRODIGY LANCING DEVICE) MISC as needed    loratadine (CLARITIN) 10 mg, DAILY    losartan (COZAAR) 25 MG tablet TAKE 1 TABLET BY MOUTH DAILY    metFORMIN (GLUCOPHAGE-XR) 500 MG extended release tablet TAKE 4 TABLETS BY MOUTH EVERY DAY WITH BREAKFAST    montelukast (SINGULAIR) 10 MG tablet TAKE 1 TABLET BY MOUTH EVERY EVENING    norethindrone-ethinyl estradiol (ORTHO-NOVUM 7/7/7, 28,) 0.5/0.75/1-35 MG-MCG per tablet 1 tablet, Oral, DAILY    pantoprazole (PROTONIX) 40 MG tablet TAKE 1 TABLET BY MOUTH TWICE DAILY    pimecrolimus (ELIDEL) 1 % cream Apply topically 2 times daily.     PRODIGY LANCETS 28G MISC 1 each, Does not apply, 2 TIMES DAILY    sertraline (ZOLOFT) 50 MG tablet TAKE 1 TABLET BY MOUTH EVERY DAY    TRULICITY 1.5 MM/4.5KZ SOPN ADMINISTER 1.5 MG UNDER THE SKIN 1 TIME A WEEK       LAST LABS  Lab Results   Component Value Date    LDLCALC 97 03/30/2021     Lab Results   Component Value Date    HDL 84 (H) 03/30/2021     Lab Results   Component Value Date    TRIG 161 (H) 03/30/2021     Lab Results   Component Value Date     12/27/2021    K 4.4 12/27/2021    CREATININE 0.7 12/27/2021     Lab Results   Component Value Date    WBC 11.1 (H) 03/30/2021    HGB 12.4 03/30/2021     03/30/2021     Lab Results   Component Value Date    ALT 15 12/27/2021    AST 17 12/27/2021    ALKPHOS 125 12/27/2021    BILITOT 0.6 12/27/2021     TSH (uIU/mL)   Date Value   09/12/2018 2.16     Lab Results   Component Value Date    GLUCOSE 123 (H) 12/27/2021     Lab Results   Component Value Date    LABA1C 6.5 12/27/2021    LABA1C 6.0 09/17/2021 LABA1C 6.1 03/30/2021     Objective:   PHYSICAL EXAM   /86 (Site: Left Upper Arm, Position: Sitting, Cuff Size: Large Adult)   Pulse 102   Ht 5' 7\" (1.702 m)   Wt (!) 308 lb (139.7 kg)   LMP 03/24/2022   SpO2 98%   Breastfeeding No   BMI 48.24 kg/m²   BP Readings from Last 5 Encounters:   04/06/22 130/86   12/27/21 122/86   09/17/21 126/76   08/06/21 126/80   07/31/21 116/76     Wt Readings from Last 5 Encounters:   04/06/22 (!) 308 lb (139.7 kg)   12/27/21 (!) 303 lb (137.4 kg)   09/17/21 (!) 302 lb (137 kg)   08/06/21 293 lb (132.9 kg)   07/31/21 293 lb 3.4 oz (133 kg)      GENERAL:   · well-developed, well-nourished, alert, no distress. LUNGS:    · Breathing unlabored  · clear to auscultation bilaterally and good air movement  CARDIOVASC:   · regular rate and rhythm  · LEGS:  Lower extremity edema: none    SKIN: warm and dry     Assessment and Plan:      Diagnosis Orders   1. Type 2 diabetes mellitus with diabetic nephropathy, without long-term current use of insulin ([facility])  POCT glycosylated hemoglobin (Hb A1C) 6.7 good    TSH    CBC with Auto Differential   2. Obesity, Class III, BMI 40-49.9 (morbid obesity) ([address]  Still gaining   3. Hyperlipidemia LDL goal <100  Stable with current medications. No adjustments needed. Will continue to monitor. 4. Anxiety  Stable with current medications. No adjustments needed. Will continue to monitor. 5. Irritable bowel syndrome with diarrhea  norethindrone-ethinyl estradiol (ORTHO-NOVUM 7/7/7, 28,) 0.5/0.75/1-35 MG-MCG per tablet   6. DUB (dysfunctional uterine bleeding)  Change to triphasic   7. Memory problem  TSH    Neuropsychological assessment    CBC with Auto Differential   8. Family history of Alzheimer's disease  Neuropsychological assessment      Continue current Tx plan. Any changes marked below.     INSTRUCTIONS  NEXT APPOINTMENT: Please schedule annual complete physical (30 minutes) in 4 months    · Dr. Delfina Pelletier and Amanda Owens (Nurse Practitioner) are using a team approach for managing diabetics in our practice. Office visits will alternate between these providers while we continue to maintain high quality of care. · PLEASE TAKE THIS FORM TO CHECK-OUT WINDOW TO SCHEDULE NEXT VISIT. · PLEASE GET BLOODWORK DRAWN TODAY ON FIRST FLOOR in 170. Take orders with you. RESULTS- most blood tests back in couple days. We will call you if any problems. If bloodwork good, you will get letter in mail or notified thru [address] (if signed up) within 2 weeks. If you do not, please call office. Please get annual dilated eye exam to screen for diabetic retinopathy which can lead to vision loss. Ask for report to be faxed to [phone].   Change to Triphasic birth control  Get neuropsych evaluation

## 2022-04-06 NOTE — PATIENT INSTRUCTIONS
INSTRUCTIONS  NEXT APPOINTMENT: Please schedule annual complete physical (30 minutes) in 4 months    · Dr. Malone and Itzel Nichols (Nurse Practitioner) are using a team approach for managing diabetics in our practice. Office visits will alternate between these providers while we continue to maintain high quality of care. · PLEASE TAKE THIS FORM TO CHECK-OUT WINDOW TO SCHEDULE NEXT VISIT. · PLEASE GET BLOODWORK DRAWN TODAY ON FIRST FLOOR in 170. Take orders with you. RESULTS- most blood tests back in couple days. We will call you if any problems. If bloodwork good, you will get letter in mail or notified thru [address] (if signed up) within 2 weeks. If you do not, please call office. Please get annual dilated eye exam to screen for diabetic retinopathy which can lead to vision loss. Ask for report to be faxed to [phone].   Change to Triphasic birth control  Get neuropsych evaluation

## 2022-04-07 PROBLEM — D50.9 MICROCYTIC ANEMIA: Status: ACTIVE | Noted: 2022-04-07

## 2022-04-22 DIAGNOSIS — J45.21 MILD INTERMITTENT ASTHMA WITH ACUTE EXACERBATION: Chronic | ICD-10-CM

## 2022-04-22 RX ORDER — FLUTICASONE PROPIONATE 100 MCG
BLISTER, WITH INHALATION DEVICE INHALATION
Qty: 60 EACH | Refills: 2 | Status: SHIPPED | OUTPATIENT
Start: 2022-04-22

## 2022-06-21 ENCOUNTER — OFFICE VISIT (OUTPATIENT)
Dept: FAMILY MEDICINE CLINIC | Age: 47
End: 2022-06-21
Payer: COMMERCIAL

## 2022-06-21 VITALS
TEMPERATURE: 98.5 F | HEIGHT: 67 IN | WEIGHT: 293 LBS | HEART RATE: 92 BPM | OXYGEN SATURATION: 98 % | DIASTOLIC BLOOD PRESSURE: 86 MMHG | SYSTOLIC BLOOD PRESSURE: 110 MMHG | BODY MASS INDEX: 45.99 KG/M2

## 2022-06-21 DIAGNOSIS — R31.9 URINARY TRACT INFECTION WITH HEMATURIA, SITE UNSPECIFIED: ICD-10-CM

## 2022-06-21 DIAGNOSIS — N39.0 URINARY TRACT INFECTION WITH HEMATURIA, SITE UNSPECIFIED: ICD-10-CM

## 2022-06-21 DIAGNOSIS — R30.0 DYSURIA: Primary | ICD-10-CM

## 2022-06-21 LAB
BILIRUBIN, POC: ABNORMAL
BLOOD URINE, POC: ABNORMAL
CLARITY, POC: CLEAR
COLOR, POC: YELLOW
GLUCOSE URINE, POC: ABNORMAL
KETONES, POC: ABNORMAL
LEUKOCYTE EST, POC: ABNORMAL
NITRITE, POC: POSITIVE
PH, POC: 6
PROTEIN, POC: 30
SPECIFIC GRAVITY, POC: 1.01
UROBILINOGEN, POC: 0.2

## 2022-06-21 PROCEDURE — 99213 OFFICE O/P EST LOW 20 MIN: CPT

## 2022-06-21 PROCEDURE — 81002 URINALYSIS NONAUTO W/O SCOPE: CPT

## 2022-06-21 RX ORDER — NITROFURANTOIN 25; 75 MG/1; MG/1
100 CAPSULE ORAL 2 TIMES DAILY
Qty: 20 CAPSULE | Refills: 0 | Status: SHIPPED | OUTPATIENT
Start: 2022-06-21 | End: 2022-07-01

## 2022-06-21 ASSESSMENT — ENCOUNTER SYMPTOMS
NAUSEA: 0
DIARRHEA: 0
VOMITING: 0
ABDOMINAL PAIN: 0
CONSTIPATION: 0
SHORTNESS OF BREATH: 0

## 2022-06-21 NOTE — PROGRESS NOTES
6/21/2022    This is a 42 y.o. female   Chief Complaint   Patient presents with    Urinary Frequency     urinary frequency, burning with urination. no fever   . EMANUEL Rebolledo is here today with complaints of possible UTI. Wednesday started with mild symptoms- burning and frequency with some itching as well. Thursday into Friday, symptoms worsened and she took an old z-pack with some improvement in symptoms on Saturday. Sunday, her symptoms returned, and continue today. Of note, she does have chronic BV for which she is prescribed Metronidazole gel. She has been using this for the itching with improvement. Denies fevers, N/V/D, flank pain, blood in urine. She is currently mid cycle. She is sexually active- one partner. Patient Active Problem List   Diagnosis    Seborrhea    Dysfunctional uterine bleeding    Candidal intertrigo    Urticaria, idiopathic    Low back pain    Allergic rhinitis    Asthma- mild persistent    GERD (gastroesophageal reflux disease)    Cholinergic urticaria    Hiatal hernia    Obesity, Class III, BMI 40-49.9 (morbid obesity) ([facility])    Obstructive apnea- CPAP    Hyperlipidemia LDL goal <100    Type 2 diabetes mellitus with diabetic nephropathy ([facility])    Irritable bowel syndrome with diarrhea    History of colon polyps    Anxiety    Microcytic anemia          Current Outpatient Medications   Medication Sig Dispense Refill    nitrofurantoin, macrocrystal-monohydrate, (MACROBID) 100 MG capsule Take 1 capsule by mouth 2 times daily for 10 days For treatment of urinary tract infection.  20 capsule 0    FLOVENT DISKUS 100 MCG/BLIST AEPB inhaler INHALE 2 PUFFS INTO THE LUNGS DAILY 60 each 2    norethindrone-ethinyl estradiol (ORTHO-NOVUM 7/7/7, 28,) 0.5/0.75/1-35 MG-MCG per tablet Take 1 tablet by mouth daily 3 packet 3    sertraline (ZOLOFT) 50 MG tablet TAKE 1 TABLET BY MOUTH EVERY DAY 30 tablet 5    losartan (COZAAR) 25 MG tablet TAKE 1 TABLET BY MOUTH DAILY 30 tablet 5    montelukast (SINGULAIR) 10 MG tablet TAKE 1 TABLET BY MOUTH EVERY EVENING 30 tablet 5    TRULICITY 1.5 OB/8.3CP SOPN ADMINISTER 1.5 MG UNDER THE SKIN 1 TIME A WEEK 2 mL 3    metFORMIN (GLUCOPHAGE-XR) 500 MG extended release tablet TAKE 4 TABLETS BY MOUTH EVERY DAY WITH BREAKFAST 360 tablet 0    desonide (DESOWEN) 0.05 % cream Apply topically 2 times daily. 15 g 5    azelastine (ASTELIN) 0.1 % nasal spray 1 spray by Nasal route 2 times daily Use in each nostril as directed 30 mL 5    clobetasol (TEMOVATE) 0.05 % external solution Apply topically 2 times daily. 50 mL 5    clotrimazole-betamethasone (LOTRISONE) 1-0.05 % cream Apply topically 2 times daily. 60 g 0    pantoprazole (PROTONIX) 40 MG tablet TAKE 1 TABLET BY MOUTH TWICE DAILY 180 tablet 0    Cannabidiol 100 MG/ML SOLN PRN anxiety      aspirin EC 81 MG EC tablet Take 1 tablet by mouth daily 30 tablet 11    blood glucose test strips (ASCENSIA AUTODISC VI;ONE TOUCH ULTRA TEST VI) strip twice a day 200 strip 3    Lancet Devices (PRODIGY LANCING DEVICE) MISC as needed 1 each 0    Blood Glucose Monitoring Suppl (PRODIGY POCKET BLOOD GLUCOSE) w/Device KIT as needed 1 kit 0    pimecrolimus (ELIDEL) 1 % cream Apply topically 2 times daily.  dicyclomine (BENTYL) 20 MG tablet TAKE 1 TABLET BY MOUTH THREE TIMES DAILY AS NEEDED FOR CRAMPING 30 tablet 5    albuterol sulfate  (90 BASE) MCG/ACT inhaler Inhale 2 puffs into the lungs every 4 hours as needed for Wheezing May substitute for insurance preferred (Ventolin, Proventil, ProAir) 1 Inhaler 3    fluticasone (FLONASE) 50 MCG/ACT nasal spray 1 spray by Nasal route daily 1 Bottle 5    loratadine (CLARITIN) 10 MG tablet Take 10 mg by mouth daily.  PRODIGY LANCETS 28G MISC 1 each by Does not apply route 2 times daily 200 each 3     No current facility-administered medications for this visit.        Allergies   Allergen Reactions    Fluoxetine Other (See Comments)

## 2022-06-23 LAB
ORGANISM: ABNORMAL
URINE CULTURE, ROUTINE: ABNORMAL

## 2022-07-22 ENCOUNTER — PATIENT MESSAGE (OUTPATIENT)
Dept: FAMILY MEDICINE CLINIC | Age: 47
End: 2022-07-22

## 2022-07-22 DIAGNOSIS — E11.21 TYPE 2 DIABETES MELLITUS WITH DIABETIC NEPHROPATHY, WITHOUT LONG-TERM CURRENT USE OF INSULIN (HCC): ICD-10-CM

## 2022-07-22 DIAGNOSIS — L50.5 CHOLINERGIC URTICARIA: ICD-10-CM

## 2022-07-22 DIAGNOSIS — E11.21 DIABETIC NEPHROPATHY WITH PROTEINURIA (HCC): ICD-10-CM

## 2022-07-22 DIAGNOSIS — F43.22 ADJUSTMENT DISORDER WITH ANXIOUS MOOD: ICD-10-CM

## 2022-07-22 DIAGNOSIS — K21.9 GASTROESOPHAGEAL REFLUX DISEASE WITHOUT ESOPHAGITIS: Chronic | ICD-10-CM

## 2022-07-22 RX ORDER — LOSARTAN POTASSIUM 25 MG/1
TABLET ORAL
Qty: 90 TABLET | Refills: 1 | Status: SHIPPED | OUTPATIENT
Start: 2022-07-22 | End: 2022-08-10 | Stop reason: SDUPTHER

## 2022-07-22 RX ORDER — PANTOPRAZOLE SODIUM 40 MG/1
TABLET, DELAYED RELEASE ORAL
Qty: 180 TABLET | Refills: 1 | Status: SHIPPED | OUTPATIENT
Start: 2022-07-22

## 2022-07-22 RX ORDER — MONTELUKAST SODIUM 10 MG/1
TABLET ORAL
Qty: 90 TABLET | Refills: 1 | Status: SHIPPED | OUTPATIENT
Start: 2022-07-22

## 2022-07-22 RX ORDER — METFORMIN HYDROCHLORIDE 500 MG/1
TABLET, EXTENDED RELEASE ORAL
Qty: 360 TABLET | Refills: 1 | Status: SHIPPED | OUTPATIENT
Start: 2022-07-22

## 2022-07-22 NOTE — TELEPHONE ENCOUNTER
From: Tanna Haile  To: Dr. Timur Cortez  Sent: 7/22/2022 1:55 PM EDT  Subject: Change of pharmacy, need refills    Our insurance is forcing us to switch pharmacies for our regular rxs to [address] [place] [address]. Please change our primary pharmacy to them. For ePrescriptions they are: [facility] [address] Mariya Robbins, [address] [place] [address], #[phone], fax #[phone], [facility] #[id number]. For eFax they are [facility], [address] [place] [address], [facility], [address] [place] [address], #[phone], fax #[phone], pharmacy [facility] or [facility] #[id number]. Then I need refills sent to them for my Losartan, Sertealine, Pantoprazole, Montelukast, and Metformin. If possible, please keep [facility] on file for one time rxs.      Please and thank you! :)

## 2022-08-10 ENCOUNTER — OFFICE VISIT (OUTPATIENT)
Dept: FAMILY MEDICINE CLINIC | Age: 47
End: 2022-08-10
Payer: COMMERCIAL

## 2022-08-10 VITALS
DIASTOLIC BLOOD PRESSURE: 88 MMHG | SYSTOLIC BLOOD PRESSURE: 140 MMHG | HEART RATE: 94 BPM | HEIGHT: 67 IN | BODY MASS INDEX: 45.99 KG/M2 | OXYGEN SATURATION: 98 % | WEIGHT: 293 LBS

## 2022-08-10 DIAGNOSIS — E11.21 DIABETIC NEPHROPATHY WITH PROTEINURIA (HCC): ICD-10-CM

## 2022-08-10 DIAGNOSIS — J45.20 MILD INTERMITTENT ASTHMA WITHOUT COMPLICATION: Chronic | ICD-10-CM

## 2022-08-10 DIAGNOSIS — K58.0 IRRITABLE BOWEL SYNDROME WITH DIARRHEA: ICD-10-CM

## 2022-08-10 DIAGNOSIS — G25.81 RLS (RESTLESS LEGS SYNDROME): ICD-10-CM

## 2022-08-10 DIAGNOSIS — Z00.00 ENCOUNTER FOR WELL ADULT EXAM WITHOUT ABNORMAL FINDINGS: Primary | ICD-10-CM

## 2022-08-10 DIAGNOSIS — I10 HYPERTENSION, ESSENTIAL: ICD-10-CM

## 2022-08-10 DIAGNOSIS — E11.21 TYPE 2 DIABETES MELLITUS WITH DIABETIC NEPHROPATHY, WITHOUT LONG-TERM CURRENT USE OF INSULIN (HCC): ICD-10-CM

## 2022-08-10 DIAGNOSIS — E78.5 HYPERLIPIDEMIA LDL GOAL <100: ICD-10-CM

## 2022-08-10 DIAGNOSIS — E66.01 OBESITY, CLASS III, BMI 40-49.9 (MORBID OBESITY) (HCC): Chronic | ICD-10-CM

## 2022-08-10 DIAGNOSIS — K21.00 GASTROESOPHAGEAL REFLUX DISEASE WITH ESOPHAGITIS WITHOUT HEMORRHAGE: Chronic | ICD-10-CM

## 2022-08-10 DIAGNOSIS — G47.33 OBSTRUCTIVE APNEA: Chronic | ICD-10-CM

## 2022-08-10 DIAGNOSIS — F41.9 ANXIETY: ICD-10-CM

## 2022-08-10 DIAGNOSIS — D50.9 MICROCYTIC ANEMIA: ICD-10-CM

## 2022-08-10 LAB
BASOPHILS ABSOLUTE: 0 K/UL (ref 0–0.2)
BASOPHILS RELATIVE PERCENT: 0.3 %
EOSINOPHILS ABSOLUTE: 0.3 K/UL (ref 0–0.6)
EOSINOPHILS RELATIVE PERCENT: 2.4 %
HBA1C MFR BLD: 6.6 %
HCT VFR BLD CALC: 39.6 % (ref 36–48)
HEMOGLOBIN: 13 G/DL (ref 12–16)
LYMPHOCYTES ABSOLUTE: 2.9 K/UL (ref 1–5.1)
LYMPHOCYTES RELATIVE PERCENT: 27.5 %
MCH RBC QN AUTO: 27.2 PG (ref 26–34)
MCHC RBC AUTO-ENTMCNC: 32.8 G/DL (ref 31–36)
MCV RBC AUTO: 82.9 FL (ref 80–100)
MONOCYTES ABSOLUTE: 0.7 K/UL (ref 0–1.3)
MONOCYTES RELATIVE PERCENT: 6.2 %
NEUTROPHILS ABSOLUTE: 6.8 K/UL (ref 1.7–7.7)
NEUTROPHILS RELATIVE PERCENT: 63.6 %
PDW BLD-RTO: 17.2 % (ref 12.4–15.4)
PLATELET # BLD: 303 K/UL (ref 135–450)
PMV BLD AUTO: 9 FL (ref 5–10.5)
RBC # BLD: 4.77 M/UL (ref 4–5.2)
WBC # BLD: 10.7 K/UL (ref 4–11)

## 2022-08-10 PROCEDURE — 99396 PREV VISIT EST AGE 40-64: CPT | Performed by: FAMILY MEDICINE

## 2022-08-10 PROCEDURE — 83036 HEMOGLOBIN GLYCOSYLATED A1C: CPT | Performed by: FAMILY MEDICINE

## 2022-08-10 RX ORDER — LOSARTAN POTASSIUM 50 MG/1
50 TABLET ORAL DAILY
Qty: 90 TABLET | Refills: 1 | Status: SHIPPED | OUTPATIENT
Start: 2022-08-10

## 2022-08-10 NOTE — PROGRESS NOTES
PHYSICAL-VISIT NOTE   Subjective:     Chief Complaint   Patient presents with    Annual Exam     Yearly check       Gisele Caldwell is a 42 y.o. female who presents for annual testing/preventive review and check-up of medical problems listed below:  1. Encounter for well adult exam without abnormal findings    2. Type 2 diabetes mellitus with diabetic nephropathy, without long-term current use of insulin (HCC)    3. Obesity, Class III, BMI 40-49.9 (morbid obesity) ([address]    4. Obstructive apnea- CPAP    5. Gastroesophageal reflux disease with esophagitis without hemorrhage    6. Hyperlipidemia LDL goal <100    7. Mild intermittent asthma without complication    8. Microcytic anemia    9. Anxiety    10. Irritable bowel syndrome with diarrhea    11. RLS (restless legs syndrome)    12. Hypertension, essential    13. Diabetic nephropathy with proteinuria (HCC)        Complaints:   Was taking iron every other day now on daily  Legs urge to move when laying in bed. Started with sertraline. Mood good with SSRI    ROS  See scanned \"Annual Adult Health Checklist\". Pertinent positives addressed above. CHART REVIEW  Social History     Tobacco Use    Smoking status: Never    Smokeless tobacco: Never    Tobacco comments:     advised not to start   Vaping Use    Vaping Use: Never used   Substance Use Topics    Alcohol use: Yes     Alcohol/week: 1.0 standard drink     Types: 1 Cans of beer per week    Drug use: No      There are no preventive care reminders to display for this patient.   Current Outpatient Medications   Medication Instructions    albuterol sulfate  (90 BASE) MCG/ACT inhaler 2 puffs, Inhalation, EVERY 4 HOURS PRN, May substitute for insurance preferred (Ventolin, Proventil, ProAir)    azelastine (ASTELIN) 0.1 % nasal spray 1 spray, Nasal, 2 TIMES DAILY, Use in each nostril as directed     Blood Glucose Monitoring Suppl (PRODIGY POCKET BLOOD GLUCOSE) w/Device KIT as needed    blood glucose test strips (ASCENSIA AUTODISC VI;ONE TOUCH ULTRA TEST VI) strip twice a day    Cannabidiol 100 MG/ML SOLN PRN anxiety    clobetasol (TEMOVATE) 0.05 % external solution Apply topically 2 times daily. clotrimazole-betamethasone (LOTRISONE) 1-0.05 % cream Apply topically 2 times daily. desonide (DESOWEN) 0.05 % cream Apply topically 2 times daily. dicyclomine (BENTYL) 20 MG tablet TAKE 1 TABLET BY MOUTH THREE TIMES DAILY AS NEEDED FOR CRAMPING    FLOVENT DISKUS 100 MCG/BLIST AEPB inhaler INHALE 2 PUFFS INTO THE LUNGS DAILY    fluticasone (FLONASE) 50 MCG/ACT nasal spray 1 spray, Nasal, DAILY    Lancet Devices (PRODIGY LANCING DEVICE) MISC as needed    loratadine (CLARITIN) 10 mg, DAILY    losartan (COZAAR) 50 mg, Oral, DAILY    metFORMIN (GLUCOPHAGE-XR) 500 MG extended release tablet Take 4 tablets daily with breakfast    montelukast (SINGULAIR) 10 MG tablet Take one tablet daily    norethindrone-ethinyl estradiol (ORTHO-NOVUM 7/7/7, 28,) 0.5/0.75/1-35 MG-MCG per tablet 1 tablet, Oral, DAILY    pantoprazole (PROTONIX) 40 MG tablet TAKE 1 TABLET BY MOUTH TWICE DAILY    pimecrolimus (ELIDEL) 1 % cream Apply topically 2 times daily.     PRODIGY LANCETS 28G MISC 1 each, Does not apply, 2 TIMES DAILY    Semaglutide (2 MG/DOSE) 2 mg, SubCUTAneous, WEEKLY    sertraline (ZOLOFT) 50 MG tablet Take one tablet daily     Family History   Problem Relation Age of Onset    Alzheimer's Disease Father     Cancer Sister         peritoneal    Other Brother         SARA    Diabetes Maternal Grandmother      LAST LABS  Lab Results   Component Value Date    LDLCALC 79 04/06/2022     Lab Results   Component Value Date    HDL 69 (H) 04/06/2022     Lab Results   Component Value Date    TRIG 205 (H) 04/06/2022     Lab Results   Component Value Date     12/27/2021    K 4.4 12/27/2021    CREATININE 0.7 12/27/2021     Lab Results   Component Value Date    WBC 10.7 04/06/2022    HGB 11.7 (L) 04/06/2022     04/06/2022     Lab Results Component Value Date    ALT 15 12/27/2021    AST 17 12/27/2021    ALKPHOS 125 12/27/2021    BILITOT 0.6 12/27/2021     TSH (uIU/mL)   Date Value   04/06/2022 3.05     Lab Results   Component Value Date    GLUCOSE 123 (H) 12/27/2021     Lab Results   Component Value Date    LABA1C 6.6 08/10/2022    LABA1C 6.7 04/06/2022    LABA1C 6.5 12/27/2021     Objective:   PHYSICAL EXAM   BP (!) 140/88 (Site: Left Upper Arm, Position: Sitting, Cuff Size: Large Adult)   Pulse 94   Ht 5' 7\" (1.702 m)   Wt (!) 310 lb (140.6 kg)   LMP 07/13/2022   SpO2 98%   BMI 48.55 kg/m²   BP Readings from Last 5 Encounters:   08/10/22 (!) 140/88   06/21/22 110/86   04/06/22 130/86   12/27/21 122/86   09/17/21 126/76     Wt Readings from Last 5 Encounters:   08/10/22 (!) 310 lb (140.6 kg)   06/21/22 (!) 306 lb (138.8 kg)   04/06/22 (!) 308 lb (139.7 kg)   12/27/21 (!) 303 lb (137.4 kg)   09/17/21 (!) 302 lb (137 kg)      GENERAL:   well-developed, well-nourished, alert, no distress. EYES:   External findings: lids and lashes normal and conjunctivae and sclerae normal  Eyes: no periorbital cellulitis. ENT:   External nose and ears appear normal  normal TM's and external ear canals both ears  Pharynx: normal. Exudates: None  Lips, mucosa, and tongue normal  Hearing grossly normal.     NECK:   Supple, symmetrical, trachea midline  Thyroid not enlarged, symmetric, no tenderness/mass/nodules  LYMPH:  no cervical nodes, no supraclavicular nodes  LUNGS:    Breathing unlabored  clear to auscultation bilaterally and good air movement  CARDIOVASC:   regular rate and rhythm, S1, S2 normal. No murmur, click, rub or gallop  Apical impulse normal  LEGS:  Lower extremity edema: none    No carotid bruits  ABDOMEN:   Soft, non-tender, no masses  No hepatosplenomegaly  No hernias noted.   Exam limited by body habitus  SKIN: warm and dry  No rashes or suspicious lesions  No nodules or induration  PSYCH:    Alert and oriented  Normal reasoning, insight good  Facial expressions full, mood appropriate  No memory disturbance noted  MUSCULOSKEL:    Gait normal, assistive device: none  No significant finger or nail findings  Spine symmetric, no deformities, no kyphosis      Assessment and Plan:      Diagnosis Orders   1. Encounter for well adult exam without abnormal findings        2. Type 2 diabetes mellitus with diabetic nephropathy, without long-term current use of insulin (HCC)  Good control  POCT glycosylated hemoglobin (Hb A1C)    Semaglutide, 2 MG/DOSE, 8 MG/3ML SOPN    DISCONTINUED: Semaglutide, 2 MG/DOSE, 8 MG/3ML SOPN      3. Obesity, Class III, BMI 40-49.9 (morbid obesity) ([facility] [address]  Will see if ozempic covered and have better effect on weight  Semaglutide, 2 MG/DOSE, 8 MG/3ML SOPN    DISCONTINUED: Semaglutide, 2 MG/DOSE, 8 MG/3ML SOPN      4. Obstructive apnea- CPAP  stable      5. Gastroesophageal reflux disease with esophagitis without hemorrhage  Stable with current medications. No adjustments needed. Will continue to monitor. 6. Hyperlipidemia LDL goal <100  Stable with current medications. No adjustments needed. Will continue to monitor. 7. Mild intermittent asthma without complication  Stable with current medications. No adjustments needed. Will continue to monitor. 8. Microcytic anemia  Recheck on daily iron. No signs of bleeding. Will stop aspirin due to no longer indicated. CBC with Auto Differential      9. Anxiety  Good on SSRI      10. Irritable bowel syndrome with diarrhea  Good on Bentyl      11. RLS (restless legs syndrome)  NEW. Watch for now. Maybe due to SSRI but may stop that over winter. 12. Hypertension, essential NEW Increase ARB  losartan (COZAAR) 50 MG tablet      13. Diabetic nephropathy with proteinuria (HCC)  losartan (COZAAR) 50 MG tablet      Stable. Plan as above and below. INSTRUCTIONS  NEXT APPOINTMENT: Please schedule check-up in 4 months with Dr. Kandy Ryan or her NP, Magali Marks.    PLEASE TAKE THIS FORM TO CHECK-OUT WINDOW TO SCHEDULE NEXT VISIT. PLEASE GET BLOODWORK DRAWN TODAY ON FIRST FLOOR in 170. Take orders with you. RESULTS- most blood tests back in couple days. We will call you if any problems. If bloodwork good, you will get letter in mail or notified thru [address] (if signed up) within 2 weeks. If you do not, please call office. Please get flu vaccine when available in fall. Can get either at this office or at stores such as [employer] and [employer]. STOP aspirin. CHANGE Trulicity to Ozempic weekly. Read handout on RLS  INCREASE lostartan from 25 to 50 mg. Use up 25 mg by taking 2 at a time.

## 2022-08-10 NOTE — PATIENT INSTRUCTIONS
(also called periodic limb movements of sleep or PLMS). Diagnosis & Tests   How does my doctor know I have RLS? Tell your doctor about the restless sensations. He or she will ask you questions about your symptoms, such as when they start and whether you're able to do anything to make them go away. He or she may also ask if any other people in your family have similar symptoms. Tell your doctor about any medicines (including over-the-counter medicines) that you're taking. Certain medicines can make RLS symptoms worse. Your doctor can recommend another medicine if this seems to be happening to you. Treatment   What is the treatment for RLS? Treatment for RLS includes medicines and lifestyle changes. See the box for a list of things that you can do at home to help relieve your symptoms. Medicines used to treat Parkinson's disease can help reduce tremors and twitching in the legs. If your iron levels are low, your doctor may prescribe an iron supplement. Sleep aids, muscle relaxants (called benzodiazepines) and pain medicines (called opioids) may also relieve symptoms. In some cases, an anticonvulsant medicine (usually used to stop seizures) can be helpful. For many cases of RLS, a combination of medicines is usually needed to best treat the condition. Your doctor may prescribe several trials of medicine before finding one that works best for your case of RLS. Lifestyle changes to treat RLS  For mild symptoms, use an over-the-counter pain reliever to reduce twitching and restless sensations. Cut back on alcohol, caffeine and tobacco.   Try taking a hot bath and massaging your legs before bedtime to help you relax. Relaxation techniques, such as meditation and yoga, can help you relax before bed. Apply warm or cool packs, which can help relieve sensations in your legs. Try to distract your mind by reading or doing a crossword puzzle while you wait for sleep to come.    Moderate exercise may help,

## 2022-08-12 ENCOUNTER — TELEPHONE (OUTPATIENT)
Dept: FAMILY MEDICINE CLINIC | Age: 47
End: 2022-08-12

## 2022-08-12 NOTE — TELEPHONE ENCOUNTER
[facility] calling regarding Ozempic 2mg being on back order and not sure when it will be back in stock. They can place it on hold and would like to know if PCP would like to send a different rx or send to a local pharm. Pharm did not list alternative. There are different dosages of Ozempic that they could order.     Please Advise  Pharm can be reached at [phone] option 2  Reference number [id number]
Rx has been cancelled with mail-order pharmacy. It has been sent to local pharmacy.
Send to local pharm please
What do you want to do? Send to local pharmacy to see if they have it or change to different med?
Dolor de pecho inespecífico      El dolor de pecho puede deberse a muchas afecciones diferentes. Siempre existe ovidio posibilidad de que el dolor esté relacionado con algo grave, lila un infarto de miocardio o un coágulo sanguíneo en los pulmones. Hay diferentes afecciones que no son potencialmente mortales que pueden causar dolor de pecho. Si tiene dolor de pecho, es muy importante que se controle con el médico.     ¿Cuáles son las causas?  Entre las causas de esta afección se incluyen las siguientes:    Acidez estomacal.  Neumonía o bronquitis.  Ansiedad o estrés.  Inflamación de la lito que rodea el corazón (pericarditis) o los pulmones (pleuritis o pleuresía).  Un coágulo sanguíneo en el pulmón.  Pulmón colapsado (neumotórax). Puede aparecer de manera repentina por sí solo (neumotórax espontáneo) o debido a un traumatismo en el tórax.  Culebrilla (virus de la varicela zóster).  Infarto de miocardio.  Daño de los huesos, los músculos y los cartílagos que conforman la pared torácica. [place] puede incluir lo siguiente:  Hematomas óseos debido a lesiones.  Distensiones musculares o de los cartílagos por tos frecuente o repetida, o por exceso de trabajo.  Fractura de ovidio o más costillas.  Dolor de cartílago debido a inflamación (costocondritis).    ¿Qué incrementa el riesgo?  Entre los factores de riesgo de esta afección se pueden incluir los siguientes:    Actividades que incrementan el riesgo de sufrir traumatismos o lesiones en el tórax.  Infecciones o enfermedades respiratorias que causan tos frecuente.  Afecciones o excesos en las comidas que pueden causar acidez estomacal.  Cardiopatías coronarias o antecedentes familiares de enfermedades cardíacas.  Afecciones o comportamientos de micheline que aumentan el riesgo de tener un coágulo sanguíneo.  Theodore tenido varicela (varicela zóster).    ¿Cuáles son los signos o los síntomas?  El dolor de pecho puede provocar las siguientes sensaciones:    Ardor u hormigueo en la superficie o en lo profundo del pecho.  Dolor opresivo, continuo o constrictivo.  Dolor vago o intenso que empeora al moverse, toser o inhalar profundamente.  Dolor que también se siente en la espalda, el joy, el hombro o el brazo, o dolor que se extiende a cualquiera de estas zonas.    El dolor de pecho puede aparecer y desaparecer, o landry puede ser syeda.    ¿Cómo se diagnostica?  Quizás se necesiten análisis de laboratorio u otros estudios para encontrar la causa del dolor. El médico puede indicarle que se rosa ovidio prueba llamada ECG (electrocardiograma). El electrocardiograma registra los patrones de los latidos cardíacos en el momento en que se realiza el estudio. También pueden hacerle otros estudios, por ejemplo:    Ecocardiograma transtorácico (ETT). En kell estudio, se usan ondas sonoras para crear ovidio imagen de las estructuras cardíacas y evaluar cómo circula la tania por el corazón.  Ecocardiografía transesofágica (ETE). [place] es un estudio de diagnóstico por imágenes más avanzado mediante el cual se holley imágenes del interior del cuerpo. Le permite al médico jeannette el corazón con mayor detalle.  Monitoreo cardíaco. Permite que el médico controle la frecuencia y el ritmo cardíacos en tiempo real.  Monitor Holter. Es un dispositivo portátil que registra los latidos del corazón y puede ayudar a diagnosticar los latidos cardíacos anómalos. Le permite al médico registrar la actividad cardíaca keyonna varios días, si es necesario.  Pruebas de esfuerzo. Estas pueden realizarse keyonna el ejercicio o mediante la administración de un medicamento que acelera los latidos del corazón.  Análisis de tania.  Otros estudios de diagnóstico por imágenes.    ¿Cómo se trata?  El tratamiento depende de la causa del dolor de pecho. El tratamiento puede incluir lo siguiente:    Medicamentos. Estos pueden incluir, entre otros, los siguientes:  Inhibidores de la acidez estomacal.  Antiinflamatorios.  Analgésicos para las enfermedades inflamatorias.  Antibióticos, si hay ovidio infección.  Medicamentos para disolver los coágulos sanguíneos.  Medicamentos para tratar la enfermedad arterial coronaria (EAC).  Tratamiento complementario para las enfermedades que no requieren la james de medicamentos. Algunas opciones de kell tipo de tratamiento incluyen las siguientes:  Hacer reposo.  Aplicar compresas frías o calientes en las zonas lesionadas.  Limitar las actividades hasta que disminuya el dolor.    Siga estas indicaciones en [place] casa:  Medicamentos    Si le recetaron un antibiótico, tómelo lila se lo haya indicado el médico. No deje de martinez el antibiótico aunque comience a sentirse mejor.  [place] los medicamentos de venta pipo y los recetados solamente lila se lo haya indicado el médico.    Estilo de gokul    No consuma ningún producto que contenga nicotina o tabaco, lila cigarrillos y cigarrillos electrónicos. Si necesita ayuda para dejar de fumar, consulte al médico.  No sara alcohol.  Rosa cambios en [place] estilo de gokul lila se lo haya indicado el médico. Estos pueden incluir, entre otros, los siguientes:   Practicar actividad física con regularidad. Pida al médico que le sugiera algunas actividades que bean seguras para usted.  Consumir ovidio dieta cardiosaludable. Un nutricionista matriculado puede ayudarlo a hacer elecciones saludables.  Mantener un peso saludable.  Controlar la diabetes, si es necesario.  Disminuir el nivel de estrés; por ejemplo, con yoga o técnicas de relajación.    Instrucciones generales    Evite las actividades que le causen dolor de pecho.  Si la causa del dolor de pecho es la acidez estomacal, levante (eleve) la cabecera de la cama aproximadamente 6 pulgadas (15 cm) colocando bloques debajo de las patas. Usar más almohadas para dormir no es efectivo para aliviar la acidez estomacal porque solo modificaría la posición de la carissa.  Concurra a todas las visitas de seguimiento lila se lo haya indicado el médico. [place] es importante. [place] incluye otros estudios si el dolor de pecho no desaparece.    Comuníquese con un médico si:  El dolor de pecho no desaparece.  Tiene ovidio erupción cutánea con ampollas en el pecho.  Tiene fiebre.  Tiene escalofríos.    Solicite ayuda de inmediato si:  El dolor en el pecho es más intenso.  Tiene tos que empeora o tose con tania.  Siente un dolor intenso en el abdomen.  Siente debilidad intensa.  Se desmaya.  Tiene ovidio molestia repentina e inexplicable en el pecho.  Tiene molestias repentinas e inexplicables en los brazos, la espalda, el joy o la mandíbula.  Le falta el aire en cualquier momento.  Comienza a sudar de manera repentina o la piel se le humedece.  Siente náuseas o vomita.  Se siente repentinamente mareado o se desmaya.  Siente que el corazón comienza a latir rápidamente o que se saltea latidos.    Estos síntomas pueden representar un problema grave que constituye ovidio emergencia. No espere hasta que los síntomas desaparezcan. Solicite atención médica de inmediato. Comuníquese con el servicio de emergencias de [place] localidad (911 en los [place]). No conduzca por robyn propios medios hasta el hospital.    NOTAS ADICIONALES E INSTRUCCIONES    Por favor tenga seguimiento con [place] doctor primario entre 2 ramos.  Regrese a urgencias por cualquier preocupacion medica.

## 2022-08-23 ENCOUNTER — PATIENT MESSAGE (OUTPATIENT)
Dept: FAMILY MEDICINE CLINIC | Age: 47
End: 2022-08-23

## 2022-08-23 DIAGNOSIS — E11.21 TYPE 2 DIABETES MELLITUS WITH DIABETIC NEPHROPATHY, WITHOUT LONG-TERM CURRENT USE OF INSULIN (HCC): ICD-10-CM

## 2022-08-24 RX ORDER — DULAGLUTIDE 1.5 MG/.5ML
INJECTION, SOLUTION SUBCUTANEOUS
Qty: 2 ML | Refills: 3 | Status: SHIPPED | OUTPATIENT
Start: 2022-08-24 | End: 2022-08-24 | Stop reason: SDUPTHER

## 2022-08-24 RX ORDER — DULAGLUTIDE 1.5 MG/.5ML
INJECTION, SOLUTION SUBCUTANEOUS
Qty: 2 ML | Refills: 3 | Status: SHIPPED | OUTPATIENT
Start: 2022-08-24

## 2022-08-24 NOTE — TELEPHONE ENCOUNTER
From: Elsy Ellsworth  To: Dr. Scooter Hill  Sent: 8/23/2022 8:59 PM EDT  Subject: Kendall Shaffer backordered    Hi Dr. Mcmahan Sayer, I have one Trulicity left. [facility] ([facility], [id number]) says the Ozempic you prescribed is still on backorder but they can get more Trulicity 1.5. I think they need instructions from you to keep filling the Trulicity until they can get me Ozempic though?

## 2022-09-23 LAB
ANION GAP SERPL CALCULATED.3IONS-SCNC: 15 MMOL/L (ref 3–16)
BUN BLDV-MCNC: 10 MG/DL (ref 7–20)
CALCIUM SERPL-MCNC: 8.8 MG/DL (ref 8.3–10.6)
CHLORIDE BLD-SCNC: 99 MMOL/L (ref 99–110)
CO2: 22 MMOL/L (ref 21–32)
CREAT SERPL-MCNC: 0.6 MG/DL (ref 0.6–1.1)
GFR AFRICAN AMERICAN: >60
GFR NON-AFRICAN AMERICAN: >60
GLUCOSE BLD-MCNC: 134 MG/DL (ref 70–99)
MAGNESIUM: 2.3 MG/DL (ref 1.8–2.4)
POTASSIUM SERPL-SCNC: 4.1 MMOL/L (ref 3.5–5.1)
SODIUM BLD-SCNC: 136 MMOL/L (ref 136–145)
VITAMIN B-12: 354 PG/ML (ref 211–911)

## 2022-11-10 ENCOUNTER — TELEPHONE (OUTPATIENT)
Dept: PULMONOLOGY | Age: 47
End: 2022-11-10

## 2022-11-10 NOTE — TELEPHONE ENCOUNTER
Patient has VV 11/15, but a 3G modem. Called patient and arranged for her to come to the [facility] office on Monday at 11am per Ad Graceze for her machine to be downloaded.

## 2022-11-14 ENCOUNTER — TELEPHONE (OUTPATIENT)
Dept: PULMONOLOGY | Age: 47
End: 2022-11-14

## 2022-11-14 ASSESSMENT — SLEEP AND FATIGUE QUESTIONNAIRES
HOW LIKELY ARE YOU TO NOD OFF OR FALL ASLEEP IN A CAR, WHILE STOPPED FOR A FEW MINUTES IN TRAFFIC: WOULD NEVER DOZE
HOW LIKELY ARE YOU TO NOD OFF OR FALL ASLEEP WHILE SITTING AND TALKING TO SOMEONE: WOULD NEVER DOZE
HOW LIKELY ARE YOU TO NOD OFF OR FALL ASLEEP WHILE SITTING AND READING: 1
HOW LIKELY ARE YOU TO NOD OFF OR FALL ASLEEP WHILE WATCHING TV: WOULD NEVER DOZE
HOW LIKELY ARE YOU TO NOD OFF OR FALL ASLEEP WHILE SITTING QUIETLY AFTER LUNCH WITHOUT ALCOHOL: 0
HOW LIKELY ARE YOU TO NOD OFF OR FALL ASLEEP WHILE SITTING INACTIVE IN A PUBLIC PLACE: 0
HOW LIKELY ARE YOU TO NOD OFF OR FALL ASLEEP WHEN YOU ARE A PASSENGER IN A CAR FOR AN HOUR WITHOUT A BREAK: 1
HOW LIKELY ARE YOU TO NOD OFF OR FALL ASLEEP WHILE SITTING QUIETLY AFTER LUNCH WITHOUT ALCOHOL: WOULD NEVER DOZE
HOW LIKELY ARE YOU TO NOD OFF OR FALL ASLEEP WHILE WATCHING TV: 1
HOW LIKELY ARE YOU TO NOD OFF OR FALL ASLEEP WHILE SITTING AND READING: WOULD NEVER DOZE
HOW LIKELY ARE YOU TO NOD OFF OR FALL ASLEEP WHILE LYING DOWN TO REST IN THE AFTERNOON WHEN CIRCUMSTANCES PERMIT: 2
HOW LIKELY ARE YOU TO NOD OFF OR FALL ASLEEP WHILE LYING DOWN TO REST IN THE AFTERNOON WHEN CIRCUMSTANCES PERMIT: HIGH CHANCE OF DOZING
HOW LIKELY ARE YOU TO NOD OFF OR FALL ASLEEP IN A CAR, WHILE STOPPED FOR A FEW MINUTES IN TRAFFIC: 0
ESS TOTAL SCORE: 5
HOW LIKELY ARE YOU TO NOD OFF OR FALL ASLEEP WHEN YOU ARE A PASSENGER IN A CAR FOR AN HOUR WITHOUT A BREAK: SLIGHT CHANCE OF DOZING
HOW LIKELY ARE YOU TO NOD OFF OR FALL ASLEEP WHILE SITTING INACTIVE IN A PUBLIC PLACE: WOULD NEVER DOZE
HOW LIKELY ARE YOU TO NOD OFF OR FALL ASLEEP WHILE SITTING AND TALKING TO SOMEONE: 0

## 2022-11-15 ENCOUNTER — TELEMEDICINE (OUTPATIENT)
Dept: PULMONOLOGY | Age: 47
End: 2022-11-15
Payer: COMMERCIAL

## 2022-11-15 DIAGNOSIS — G47.33 OBSTRUCTIVE APNEA: Primary | Chronic | ICD-10-CM

## 2022-11-15 DIAGNOSIS — E66.01 OBESITY, CLASS III, BMI 40-49.9 (MORBID OBESITY) (HCC): Chronic | ICD-10-CM

## 2022-11-15 DIAGNOSIS — E11.21 TYPE 2 DIABETES MELLITUS WITH DIABETIC NEPHROPATHY, WITHOUT LONG-TERM CURRENT USE OF INSULIN (HCC): Chronic | ICD-10-CM

## 2022-11-15 DIAGNOSIS — G25.81 RLS (RESTLESS LEGS SYNDROME): ICD-10-CM

## 2022-11-15 DIAGNOSIS — I10 HYPERTENSION, ESSENTIAL: ICD-10-CM

## 2022-11-15 PROCEDURE — 3044F HG A1C LEVEL LT 7.0%: CPT | Performed by: NURSE PRACTITIONER

## 2022-11-15 PROCEDURE — 99214 OFFICE O/P EST MOD 30 MIN: CPT | Performed by: NURSE PRACTITIONER

## 2022-11-15 NOTE — PROGRESS NOTES
[facility]  [address] [facility], [address] [facility]- [phone]   [facility] Geovanna Angelo. [address] [place] [address]. Tim Stratton 39 [phone]     Video Visit- Follow up    Russell Moore, was evaluated through a synchronous (real-time) audio-video  encounter. The patient (or guardian if applicable) is aware that this is a billable  service, which includes applicable co-pays. This Virtual Visit was conducted with  patient's (and/or legal guardian's) consent. The visit was conducted pursuant to  the emergency declaration under the [address] [place] [address] authority and the [employer] and  [employer] General Act. Patient identification was verified,  and a caregiver was present when appropriate. The patient was located in a  state where the provider was licensed to provide care. Assessment/Plan:      1. Obstructive apnea- CPAP  Assessment & Plan:   Chronic-Stable: Reviewed and analyzed results of physiologic download from patient's machine and reviewed with patient. Supplies and parts as needed for her machine. These are medically necessary. Limit caffeine use after 3pm. Based on the analyzed data will continue with current settings. Stable on her machine at current settings, getting benefit from the use, and having minimal side effects. Discussed we could trial a pressure increase on her machine to see if this would help with RLS since symptoms begin as she is starting to doze. She declines at this time. Will see her back in 1 year. Encouraged her to contact the office with any questions or concerns. Orders:  -     Ferritin; Future  -     Iron and TIBC; Future  2. Hypertension, essential  Assessment & Plan:  Chronic- Stable. Discussed the importance of treating obstructive sleep apnea as part of the management of this disorder. Cont any meds per PCP and other physicians.     3. Type 2 diabetes mellitus with diabetic nephropathy, without long-term current use of insulin ([facility] [address]  Assessment & Plan:  Chronic- Stable. Discussed the importance of treating obstructive sleep apnea as part of the management of this disorder. Cont any meds per PCP and other physicians. 4. RLS (restless legs syndrome)  Assessment & Plan:  Chronic- Stable. Discussed the importance of treating obstructive sleep apnea as part of the management of this disorder. Will check for secondary causes of RLS (hypothyroid, low Fe, renal dysfunction). Discussed avoiding caffeine and nicotine. Discussed working aerobic exersice and avoiding meds that can exacerbate RLS. Discussed considering speaking to her PCP about switching to a different anti anxiety medication. She has tried some other medications in the past and found the sertraline has worked the best for her. Could also consider switching sertraline in the evenings instead of the mornings. Discussed increasing elemental iron to BID if iron stores are low. Orders:  -     Ferritin; Future  -     Iron and TIBC; Future  5. Obesity, Class III, BMI 40-49.9 (morbid obesity) ([facility])  Assessment & Plan:  Chronic-not stable:  Discussed importance of treating obstructive sleep apnea and getting sufficient sleep to assist with weight control. Encouraged her to work on weight loss through diet and exercise. Recommended DASH or Mediterranean diets. Reviewed, analyzed, and documented physiologic data from patient's PAP machine. This information was analyzed to assess complexity and medical decision making in regards to further testing and management. The primary encounter diagnosis was Obstructive apnea- CPAP. Diagnoses of Hypertension, essential, Type 2 diabetes mellitus with diabetic nephropathy, without long-term current use of insulin ([facility]), RLS (restless legs syndrome), and Obesity, Class III, BMI 40-49.9 (morbid obesity) ([facility] [address] were also pertinent to this visit.  The chronic medical conditions listed are directly related to the primary diagnosis listed above. The management of the primary diagnosis affects the secondary diagnosis and vice versa. Subjective:     Patient ID: Joselyn Yeboah is a 41 y.o. female. Chief Complaint   Patient presents with    Sleep Apnea     Subjective   HPI:    Machine Modem/Download Info:  Compliance (hours/night): 8.1 hrs/night  % of nights >= 4 hrs: 96 %  Download AHI (/hour): 1.4 /HR  Average CPAP Pressure : 11.8 cmH2O      APAP - Settings  Pressure Min: 9 cmH2O  Pressure Max: 17 cmH2O                 Comfort Settings  Flex/EPR (0-3): 3 PAP Mask  Mask Type: Full Face mask     Joselyn Yeboah reports she is doing well with her machine. The pressure on her machine is comfortable and she is waking rested. she denies headaches, congestion, nosebleeds, dryness, aerophagia, or drowsiness while driving. Her mask is comfortable and is fitting well. RLS: Some trouble with RLS the last few years. She has been discussing with her PCP and thinks it may be related to her Zoloft but is hesitant to try a different medication as she had tried others in the past that did not work as well. She is also not wanting to add any medications to treat the RLS at this time. She starts having trouble with her legs as she gets into bed and is starting to fall asleep. She feels like she needs to move her legs. Sometimes she is able to move them around in bed to get them to resolve. Other times she has to get up and stretch or walk up and down her stairs. Once she is asleep they do not bother her. She is taking Zoloft in the morning and has also been taking 65 mg elemental iron with calcium daily since the spring. She has not noticed any improvement in her symptoms.      [facility]    [place] -  5    Current Outpatient Medications   Medication Instructions    albuterol sulfate  (90 BASE) MCG/ACT inhaler 2 puffs, Inhalation, EVERY 4 HOURS PRN, May substitute for insurance preferred (Ventolin, Proventil, ProAir)    azelastine (ASTELIN) 0.1 % nasal spray 1 spray, Nasal, 2 TIMES DAILY, Use in each nostril as directed     Blood Glucose Monitoring Suppl (PRODIGY POCKET BLOOD GLUCOSE) w/Device KIT as needed    blood glucose test strips (ASCENSIA AUTODISC VI;ONE TOUCH ULTRA TEST VI) strip twice a day    Cannabidiol 100 MG/ML SOLN PRN anxiety    clobetasol (TEMOVATE) 0.05 % external solution Apply topically 2 times daily. clotrimazole-betamethasone (LOTRISONE) 1-0.05 % cream Apply topically 2 times daily. desonide (DESOWEN) 0.05 % cream Apply topically 2 times daily. dicyclomine (BENTYL) 20 MG tablet TAKE 1 TABLET BY MOUTH THREE TIMES DAILY AS NEEDED FOR CRAMPING    Dulaglutide (TRULICITY) 1.5 JK/5.2SS SOPN ADMINISTER 1.5 MG UNDER THE SKIN 1 TIME A WEEK    FLOVENT DISKUS 100 MCG/BLIST AEPB inhaler INHALE 2 PUFFS INTO THE LUNGS DAILY    fluticasone (FLONASE) 50 MCG/ACT nasal spray 1 spray, Nasal, DAILY    Lancet Devices (PRODIGY LANCING DEVICE) MISC as needed    loratadine (CLARITIN) 10 mg, DAILY    losartan (COZAAR) 50 mg, Oral, DAILY    metFORMIN (GLUCOPHAGE-XR) 500 MG extended release tablet Take 4 tablets daily with breakfast    montelukast (SINGULAIR) 10 MG tablet Take one tablet daily    norethindrone-ethinyl estradiol (ORTHO-NOVUM 7/7/7, 28,) 0.5/0.75/1-35 MG-MCG per tablet 1 tablet, Oral, DAILY    pantoprazole (PROTONIX) 40 MG tablet TAKE 1 TABLET BY MOUTH TWICE DAILY    pimecrolimus (ELIDEL) 1 % cream Apply topically 2 times daily.     PRODIGY LANCETS 28G MISC 1 each, Does not apply, 2 TIMES DAILY    Semaglutide (2 MG/DOSE) 2 mg, SubCUTAneous, WEEKLY    sertraline (ZOLOFT) 50 MG tablet Take one tablet daily        Electronically signed by OC Horan on 11/15/2022 at 11:21 AM

## 2022-11-15 NOTE — ASSESSMENT & PLAN NOTE
Chronic-Stable: Reviewed and analyzed results of physiologic download from patient's machine and reviewed with patient. Supplies and parts as needed for her machine. These are medically necessary. Limit caffeine use after 3pm. Based on the analyzed data will continue with current settings. Stable on her machine at current settings, getting benefit from the use, and having minimal side effects. Discussed we could trial a pressure increase on her machine to see if this would help with RLS since symptoms begin as she is starting to doze. She declines at this time. Will see her back in 1 year. Encouraged her to contact the office with any questions or concerns.

## 2022-11-15 NOTE — PROGRESS NOTES
Diagnosis: [x] SARA (G47.33) [] CSA (G47.31) [] Apnea (G47.30)   Length of Need: [x] 15 Months [] 99 Months [] Other:   Machine (MARISSA!): [] Respironics Dream Station      Auto [] ResMed AirSense     Auto [] Other:     []  CPAP () [] Bilevel ()   Mode: [] Auto [] Spontaneous    Mode: [] Auto [] Spontaneous             Comfort Settings:      Humidifier: [] Heated ()        [x] Water chamber replacement ()/ 1 per 6 months        Mask:   [] Nasal () /1 per 3 months [x] Full Face () /1 per 3 months   [] Patient choice -Size and fit mask [x] Patient Choice - Size and fit mask   [] Dispense: [] Dispense:   [] Headgear () / 1 per 3 months [x] Headgear () / 1 per 3 months   [] Replacement Nasal Cushion ()/2 per month [x] Interface Replacement ()/1 per month   [] Replacement Nasal Pillows ()/2 per month         Tubing: [x] Heated ()/1 per 3 months    [] Standard ()/1 per 3 months [] Other:           Filters: [x] Non-disposable ()/1 per 6 months     [x] Ultra-Fine, Disposable ()/2 per month        Miscellaneous: [] Chin Strap ()/ 1 per 6 months [] O2 bleed-in:        LPM   [] Oxymetry on CPAP/Bilevel []  Other:         Start Order Date: 11/15/22    MEDICAL JUSTIFICATION:  I, the undersigned, certify that the above prescribed supplies are medically necessary for this patients wellbeing. In my opinion, the supplies are both reasonable and necessary in reference to accepted standards of medicalpractice in treatment of this patients condition. Lakeisha Salvador NP    NPI: [id number]       Order Signed Date: 11/15/22  [address] [facility]  Pulmonary, Sleep, and Critical Care    Pulmonary, Sleep, and Critical Care  [facility] [address] [place] [address].  [facility], [address] [facility] , [address] [facility]  Phone: [phone]    Fax: [address]  1975  [address] [place] [address]  [phone] (home)   [phone] (mobile)      Insurance Info (confirm with patient if correct):  Payer/Plan Subscr  Sex Relation Sub.  Ins. ID Effective Group Num

## 2022-11-15 NOTE — LETTER
[facility] Sleep [facility]  [address] [facility]  [address] [address]  Phone: [phone]  Fax: [phone]    November 15, 2022       Patient: Madelyn Tavares   MR Number: [id number]   YOB: 1975   Date of Visit: 11/15/2022       Madelyn Tavares was seen for a follow up visit today. Here is my assessment and plan as well as an attached copy of her visit today:    Type 2 diabetes mellitus with diabetic nephropathy ([facility] [address]  Chronic- Stable. Discussed the importance of treating obstructive sleep apnea as part of the management of this disorder. Cont any meds per PCP and other physicians. Hypertension, essential  Chronic- Stable. Discussed the importance of treating obstructive sleep apnea as part of the management of this disorder. Cont any meds per PCP and other physicians. Obstructive apnea- CPAP   Chronic-Stable: Reviewed and analyzed results of physiologic download from patient's machine and reviewed with patient. Supplies and parts as needed for her machine. These are medically necessary. Limit caffeine use after 3pm. Based on the analyzed data will continue with current settings. Stable on her machine at current settings, getting benefit from the use, and having minimal side effects. Discussed we could trial a pressure increase on her machine to see if this would help with RLS since symptoms begin as she is starting to doze. She declines at this time. Will see her back in 1 year. Encouraged her to contact the office with any questions or concerns. RLS (restless legs syndrome)  Chronic- Stable. Discussed the importance of treating obstructive sleep apnea as part of the management of this disorder. Will check for secondary causes of RLS (hypothyroid, low Fe, renal dysfunction). Discussed avoiding caffeine and nicotine. Discussed working aerobic exersice and avoiding meds that can exacerbate RLS.  Discussed considering speaking to her PCP about switching to a different anti anxiety medication. She has tried some other medications in the past and found the sertraline has worked the best for her. Could also consider switching sertraline in the evenings instead of the mornings. Discussed increasing elemental iron to BID if iron stores are low. Obesity, Class III, BMI 40-49.9 (morbid obesity) (HCC)  Chronic-not stable:  Discussed importance of treating obstructive sleep apnea and getting sufficient sleep to assist with weight control. Encouraged her to work on weight loss through diet and exercise. Recommended DASH or Mediterranean diets. If you have questions or concerns, please do not hesitate to call me. I look forward to following Debbie Anderson along with you.     Sincerely,    OC Cisneros    CC providers:  Nandini Riggins MD  [address] [place] [address] Drive [address]  [address] [employer]

## 2022-11-15 NOTE — ASSESSMENT & PLAN NOTE
Chronic- Stable. Discussed the importance of treating obstructive sleep apnea as part of the management of this disorder. Will check for secondary causes of RLS (hypothyroid, low Fe, renal dysfunction). Discussed avoiding caffeine and nicotine. Discussed working aerobic exersice and avoiding meds that can exacerbate RLS. Discussed considering speaking to her PCP about switching to a different anti anxiety medication. She has tried some other medications in the past and found the sertraline has worked the best for her. Could also consider switching sertraline in the evenings instead of the mornings. Discussed increasing elemental iron to BID if iron stores are low.

## 2022-11-23 ENCOUNTER — TELEPHONE (OUTPATIENT)
Dept: PULMONOLOGY | Age: 47
End: 2022-11-23

## 2022-11-23 DIAGNOSIS — G25.81 RLS (RESTLESS LEGS SYNDROME): ICD-10-CM

## 2022-11-23 DIAGNOSIS — G47.33 OBSTRUCTIVE APNEA: Chronic | ICD-10-CM

## 2022-11-23 LAB
FERRITIN: 53.9 NG/ML (ref 15–150)
IRON SATURATION: 27 % (ref 15–50)
IRON: 120 UG/DL (ref 37–145)
TOTAL IRON BINDING CAPACITY: 443 UG/DL (ref 260–445)

## 2022-11-23 NOTE — TELEPHONE ENCOUNTER
Ferritin, iron, TIBC labs reviewed. Would recommend iron supplementation. She can get elemental iron tablets over-the-counter for supplementation to ideally get ferritin levels above 75 to help with RLS. She can take with vitamin C to help with absorption.

## 2022-11-28 NOTE — TELEPHONE ENCOUNTER
Called patient and informed per Ana Stock; she said that she is already taking an iron/vit c combo and wants to know if you would like her to double it and take it 2x a day instead of once.

## 2022-11-30 ENCOUNTER — OFFICE VISIT (OUTPATIENT)
Dept: FAMILY MEDICINE CLINIC | Age: 47
End: 2022-11-30
Payer: COMMERCIAL

## 2022-11-30 VITALS
HEIGHT: 67 IN | SYSTOLIC BLOOD PRESSURE: 124 MMHG | HEART RATE: 98 BPM | WEIGHT: 293 LBS | BODY MASS INDEX: 45.99 KG/M2 | OXYGEN SATURATION: 98 % | DIASTOLIC BLOOD PRESSURE: 86 MMHG

## 2022-11-30 DIAGNOSIS — E66.01 OBESITY, CLASS III, BMI 40-49.9 (MORBID OBESITY) (HCC): Chronic | ICD-10-CM

## 2022-11-30 DIAGNOSIS — K21.00 GASTROESOPHAGEAL REFLUX DISEASE WITH ESOPHAGITIS WITHOUT HEMORRHAGE: Chronic | ICD-10-CM

## 2022-11-30 DIAGNOSIS — E11.21 TYPE 2 DIABETES MELLITUS WITH DIABETIC NEPHROPATHY, WITHOUT LONG-TERM CURRENT USE OF INSULIN (HCC): Primary | ICD-10-CM

## 2022-11-30 DIAGNOSIS — I10 HYPERTENSION, ESSENTIAL: ICD-10-CM

## 2022-11-30 DIAGNOSIS — G47.33 OBSTRUCTIVE APNEA: Chronic | ICD-10-CM

## 2022-11-30 DIAGNOSIS — M25.571 ACUTE RIGHT ANKLE PAIN: ICD-10-CM

## 2022-11-30 DIAGNOSIS — E78.5 HYPERLIPIDEMIA LDL GOAL <100: ICD-10-CM

## 2022-11-30 LAB — HBA1C MFR BLD: 7 %

## 2022-11-30 PROCEDURE — 99214 OFFICE O/P EST MOD 30 MIN: CPT

## 2022-11-30 PROCEDURE — 3074F SYST BP LT 130 MM HG: CPT

## 2022-11-30 PROCEDURE — 83036 HEMOGLOBIN GLYCOSYLATED A1C: CPT

## 2022-11-30 PROCEDURE — 3051F HG A1C>EQUAL 7.0%<8.0%: CPT

## 2022-11-30 PROCEDURE — 3078F DIAST BP <80 MM HG: CPT

## 2022-11-30 RX ORDER — DULAGLUTIDE 1.5 MG/.5ML
INJECTION, SOLUTION SUBCUTANEOUS
Qty: 2 ML | Refills: 3 | Status: SHIPPED | OUTPATIENT
Start: 2022-11-30

## 2022-11-30 NOTE — PROGRESS NOTES
11/30/2022    This is a 41 y.o. female   Chief Complaint   Patient presents with    Diabetes     4 mo dm f/u, was changed for Trulicity to Ozempic. Not available, is it okay to go holguin on the Trulicity? Also needs new BS meter and supplies   . HPI    Sprained right ankle at beginning of November- has been resting, icing, and occassional ibuprofen- feels like it is worsening. Would like to see ortho Sherrine Jobs) for evaluation. Diabetes Mellitus Type 2: Current symptoms/problems include none. Home blood sugar records:  has not been checking lately  Any episodes of hypoglycemia? no  Eye exam current (within one year): yes- has next appointment scheduled in January  Tobacco history: She  reports that she has never smoked. She has never used smokeless tobacco.       Hypertension:  Home blood pressure monitoring: No.  She is adherent to a low sodium diet. Patient denies chest pain, shortness of breath, headache, lightheadedness, blurred vision, peripheral edema, palpitations, dry cough, and fatigue. Antihypertensive medication side effects: no medication side effects noted. Use of agents associated with hypertension: oral contraceptives.      Lab Results   Component Value Date    LABA1C 7.0 11/30/2022    LABA1C 6.6 08/10/2022    LABA1C 6.7 04/06/2022     Lab Results   Component Value Date    LABMICR 1.90 12/27/2021    CREATININE 0.6 09/23/2022     Lab Results   Component Value Date    ALT 15 12/27/2021    AST 17 12/27/2021     Lab Results   Component Value Date    CHOL 189 04/06/2022    TRIG 205 (H) 04/06/2022    HDL 69 (H) 04/06/2022    LDLCALC 79 04/06/2022           Patient Active Problem List   Diagnosis    Seborrhea    Dysfunctional uterine bleeding    Candidal intertrigo    Urticaria, idiopathic    Low back pain    Allergic rhinitis    Asthma- mild persistent    GERD (gastroesophageal reflux disease)    Cholinergic urticaria    Hiatal hernia    Obesity, Class III, BMI 40-49.9 (morbid obesity) ([address] Obstructive apnea- CPAP    Hyperlipidemia LDL goal <100    Type 2 diabetes mellitus with diabetic nephropathy (HCC)    Irritable bowel syndrome with diarrhea    History of colon polyps    Anxiety    Microcytic anemia    RLS (restless legs syndrome)    Hypertension, essential          Current Outpatient Medications   Medication Sig Dispense Refill    blood glucose test strips (ASCENSIA AUTODISC VI;ONE TOUCH ULTRA TEST VI) strip 1 each by In Vitro route daily As needed. 100 each 3    blood glucose monitor kit and supplies Dispense sufficient amount for indicated testing frequency plus additional to accommodate PRN testing needs. Dispense all needed supplies to include: monitor, strips, lancing device, lancets, control solutions, alcohol swabs. 1 kit 0    dulaglutide (TRULICITY) 1.5 TU/4.6ND SC injection ADMINISTER 1.5 MG UNDER THE SKIN 1 TIME A WEEK 2 mL 3    losartan (COZAAR) 50 MG tablet Take 1 tablet by mouth in the morning. 90 tablet 1    sertraline (ZOLOFT) 50 MG tablet Take one tablet daily 90 tablet 1    montelukast (SINGULAIR) 10 MG tablet Take one tablet daily 90 tablet 1    metFORMIN (GLUCOPHAGE-XR) 500 MG extended release tablet Take 4 tablets daily with breakfast 360 tablet 1    pantoprazole (PROTONIX) 40 MG tablet TAKE 1 TABLET BY MOUTH TWICE DAILY 180 tablet 1    FLOVENT DISKUS 100 MCG/BLIST AEPB inhaler INHALE 2 PUFFS INTO THE LUNGS DAILY 60 each 2    norethindrone-ethinyl estradiol (ORTHO-NOVUM 7/7/7, 28,) 0.5/0.75/1-35 MG-MCG per tablet Take 1 tablet by mouth daily 3 packet 3    desonide (DESOWEN) 0.05 % cream Apply topically 2 times daily. 15 g 5    azelastine (ASTELIN) 0.1 % nasal spray 1 spray by Nasal route 2 times daily Use in each nostril as directed 30 mL 5    clobetasol (TEMOVATE) 0.05 % external solution Apply topically 2 times daily. 50 mL 5    clotrimazole-betamethasone (LOTRISONE) 1-0.05 % cream Apply topically 2 times daily.  60 g 0    Cannabidiol 100 MG/ML SOLN PRN anxiety      Lancet Devices (PRODIGY LANCING DEVICE) MISC as needed 1 each 0    Blood Glucose Monitoring Suppl (PRODIGY POCKET BLOOD GLUCOSE) w/Device KIT as needed 1 kit 0    pimecrolimus (ELIDEL) 1 % cream Apply topically 2 times daily. dicyclomine (BENTYL) 20 MG tablet TAKE 1 TABLET BY MOUTH THREE TIMES DAILY AS NEEDED FOR CRAMPING 30 tablet 5    albuterol sulfate  (90 BASE) MCG/ACT inhaler Inhale 2 puffs into the lungs every 4 hours as needed for Wheezing May substitute for insurance preferred (Ventolin, Proventil, ProAir) 1 Inhaler 3    fluticasone (FLONASE) 50 MCG/ACT nasal spray 1 spray by Nasal route daily 1 Bottle 5    loratadine (CLARITIN) 10 MG tablet Take 10 mg by mouth daily. PRODIGY LANCETS 28G MISC 1 each by Does not apply route 2 times daily 200 each 3     No current facility-administered medications for this visit. Allergies   Allergen Reactions    Fluoxetine Other (See Comments)     Overtim, head ache    Lisinopril Other (See Comments)     cough    Oseltamivir Phosphate Other (See Comments)    Lexapro [Escitalopram Oxalate] Other (See Comments)     sleepy    Methylisothiazolinone Itching and Rash    Sulfa Antibiotics Rash       Review of Systems   Constitutional:  Negative for activity change and fever. Respiratory:  Negative for shortness of breath. Cardiovascular:  Negative for chest pain, palpitations and leg swelling. Gastrointestinal:  Negative for abdominal pain. Musculoskeletal:  Positive for joint swelling. Neurological:  Negative for dizziness and headaches. Vitals:    11/30/22 1604   BP: 124/86   Site: Left Upper Arm   Position: Sitting   Cuff Size: Large Adult   Pulse: 98   SpO2: 98%   Weight: (!) 313 lb (142 kg)   Height: 5' 7\" (1.702 m)       Body mass index is 49.02 kg/m².      Wt Readings from Last 3 Encounters:   11/30/22 (!) 313 lb (142 kg)   08/10/22 (!) 310 lb (140.6 kg)   06/21/22 (!) 306 lb (138.8 kg)       BP Readings from Last 3 Encounters:   11/30/22 124/86   08/10/22 (!) 140/88   06/21/22 110/86       Physical Exam  Vitals reviewed. Constitutional:       General: She is not in acute distress. Appearance: Normal appearance. HENT:      Head: Normocephalic and atraumatic. Cardiovascular:      Rate and Rhythm: Normal rate and regular rhythm. Pulses:           Dorsalis pedis pulses are 2+ on the right side and 2+ on the left side. Posterior tibial pulses are 2+ on the right side and 2+ on the left side. Heart sounds: Normal heart sounds. No murmur heard. No friction rub. No gallop. Pulmonary:      Effort: Pulmonary effort is normal. No respiratory distress. Breath sounds: Normal breath sounds. Musculoskeletal:         General: Normal range of motion. Right lower leg: No edema. Left lower leg: No edema. Right ankle: Swelling present. Tenderness present. Normal range of motion. Right foot: Normal range of motion. No deformity, bunion, Charcot foot, foot drop or prominent metatarsal heads. Left foot: Normal range of motion. No deformity, bunion, Charcot foot, foot drop or prominent metatarsal heads. Feet:      Right foot:      Protective Sensation: 10 sites tested. 10 sites sensed. Skin integrity: Skin integrity normal.      Toenail Condition: Right toenails are normal.      Left foot:      Protective Sensation: 10 sites tested. 10 sites sensed. Skin integrity: Skin integrity normal.      Toenail Condition: Left toenails are normal.   Skin:     General: Skin is warm and dry. Neurological:      Mental Status: She is oriented to person, place, and time. Psychiatric:         Behavior: Behavior normal.         Thought Content: Thought content normal.         Judgment: Judgment normal.       Assessmentand Plan  Guy Ambrosio was seen today for diabetes.     Diagnoses and all orders for this visit:    Type 2 diabetes mellitus with diabetic nephropathy, without long-term current use of insulin ([email or web address]  - blood glucose test strips (ASCENSIA AUTODISC VI;ONE TOUCH ULTRA TEST VI) strip; 1 each by In Vitro route daily As needed. -     blood glucose monitor kit and supplies; Dispense sufficient amount for indicated testing frequency plus additional to accommodate PRN testing needs. Dispense all needed supplies to include: monitor, strips, lancing device, lancets, control solutions, alcohol swabs. -     POCT glycosylated hemoglobin (Hb A1C)  -     dulaglutide (TRULICITY) 1.5 BU/0.4DO SC injection; ADMINISTER 1.5 MG UNDER THE SKIN 1 TIME A WEEK  -      DIABETES FOOT EXAM  HgbA1C 7.0%  Ozempic on backorder, would like refill of Trulicity  Well controlled, tolerating medications, no changes  Hypertension, essential  Well controlled, tolerating medications, no changes  Hyperlipidemia LDL goal <100  Well controlled, tolerating medications, no changes  Gastroesophageal reflux disease with esophagitis without hemorrhage  Well controlled, tolerating medications, no changes  Obstructive apnea- CPAP  Per pulmonology, stable  Obesity, Class III, BMI 40-49.9 (morbid obesity) ([address]  Continue diet and exercise efforts  Continue Trulicity  Acute right ankle pain  -     External Referral To Orthopedic Surgery  External referral- would like to be seen at [facility] to wear supportive shoes, take it easy    Return in about 4 months (around 3/30/2023).

## 2022-12-01 ASSESSMENT — ENCOUNTER SYMPTOMS
SHORTNESS OF BREATH: 0
ABDOMINAL PAIN: 0

## 2022-12-06 ENCOUNTER — TELEPHONE (OUTPATIENT)
Dept: FAMILY MEDICINE CLINIC | Age: 47
End: 2022-12-06

## 2022-12-06 DIAGNOSIS — E11.21 TYPE 2 DIABETES MELLITUS WITH DIABETIC NEPHROPATHY, WITHOUT LONG-TERM CURRENT USE OF INSULIN (HCC): ICD-10-CM

## 2022-12-06 RX ORDER — LANCETS
EACH MISCELLANEOUS
Qty: 200 EACH | Refills: 3 | Status: SHIPPED | OUTPATIENT
Start: 2022-12-06

## 2022-12-06 NOTE — TELEPHONE ENCOUNTER
[facility] Calling from dm supplies dept     Trying to get supplies changed over that were   Received 11.30.2022   Pt  wants to go with the accucheck instead of ultra touch - everything in the kit would need to be changed  Accucheck soft clicks are the lancets   Can e-scribe     Please advise

## 2022-12-09 ENCOUNTER — PATIENT MESSAGE (OUTPATIENT)
Dept: FAMILY MEDICINE CLINIC | Age: 47
End: 2022-12-09

## 2023-01-21 DIAGNOSIS — E11.21 DIABETIC NEPHROPATHY WITH PROTEINURIA (HCC): ICD-10-CM

## 2023-01-21 DIAGNOSIS — I10 HYPERTENSION, ESSENTIAL: ICD-10-CM

## 2023-01-23 RX ORDER — LOSARTAN POTASSIUM 50 MG/1
TABLET ORAL
Qty: 90 TABLET | Refills: 1 | Status: SHIPPED | OUTPATIENT
Start: 2023-01-23

## 2023-01-24 ENCOUNTER — APPOINTMENT (OUTPATIENT)
Dept: CT IMAGING | Age: 48
DRG: 854 | End: 2023-01-24
Payer: COMMERCIAL

## 2023-01-24 ENCOUNTER — APPOINTMENT (OUTPATIENT)
Dept: ULTRASOUND IMAGING | Age: 48
DRG: 854 | End: 2023-01-24
Payer: COMMERCIAL

## 2023-01-24 ENCOUNTER — HOSPITAL ENCOUNTER (INPATIENT)
Age: 48
LOS: 5 days | Discharge: HOME | DRG: 854 | End: 2023-01-29
Attending: STUDENT IN AN ORGANIZED HEALTH CARE EDUCATION/TRAINING PROGRAM | Admitting: STUDENT IN AN ORGANIZED HEALTH CARE EDUCATION/TRAINING PROGRAM
Payer: COMMERCIAL

## 2023-01-24 DIAGNOSIS — E11.65 HYPERGLYCEMIA DUE TO DIABETES MELLITUS (HCC): ICD-10-CM

## 2023-01-24 DIAGNOSIS — A41.9 SEPTICEMIA (HCC): Primary | ICD-10-CM

## 2023-01-24 DIAGNOSIS — K80.20 CALCULUS OF GALLBLADDER WITHOUT CHOLECYSTITIS WITHOUT OBSTRUCTION: ICD-10-CM

## 2023-01-24 DIAGNOSIS — K81.9 CHOLECYSTITIS: ICD-10-CM

## 2023-01-24 DIAGNOSIS — R10.13 ABDOMINAL PAIN, EPIGASTRIC: ICD-10-CM

## 2023-01-24 PROBLEM — R10.9 ABDOMINAL PAIN: Status: ACTIVE | Noted: 2023-01-24

## 2023-01-24 PROBLEM — E11.9 DM2 (DIABETES MELLITUS, TYPE 2) (HCC): Status: ACTIVE | Noted: 2023-01-24

## 2023-01-24 LAB
A/G RATIO: 0.9 (ref 1.1–2.2)
ALBUMIN SERPL-MCNC: 3.9 G/DL (ref 3.4–5)
ALP BLD-CCNC: 130 U/L (ref 40–129)
ALT SERPL-CCNC: 20 U/L (ref 10–40)
ANION GAP SERPL CALCULATED.3IONS-SCNC: 21 MMOL/L (ref 3–16)
AST SERPL-CCNC: 27 U/L (ref 15–37)
BACTERIA: NORMAL /HPF
BASOPHILS ABSOLUTE: 0.1 K/UL (ref 0–0.2)
BASOPHILS RELATIVE PERCENT: 0.4 %
BILIRUB SERPL-MCNC: 0.3 MG/DL (ref 0–1)
BILIRUBIN URINE: NEGATIVE
BLOOD, URINE: ABNORMAL
BUN BLDV-MCNC: 17 MG/DL (ref 7–20)
CALCIUM SERPL-MCNC: 10.2 MG/DL (ref 8.3–10.6)
CHLORIDE BLD-SCNC: 98 MMOL/L (ref 99–110)
CLARITY: CLEAR
CO2: 18 MMOL/L (ref 21–32)
COLOR: YELLOW
CREAT SERPL-MCNC: 0.6 MG/DL (ref 0.6–1.1)
EKG ATRIAL RATE: 94 BPM
EKG DIAGNOSIS: NORMAL
EKG P AXIS: 31 DEGREES
EKG P-R INTERVAL: 134 MS
EKG Q-T INTERVAL: 370 MS
EKG QRS DURATION: 86 MS
EKG QTC CALCULATION (BAZETT): 463 MS
EKG R AXIS: 19 DEGREES
EKG T AXIS: 10 DEGREES
EKG VENTRICULAR RATE: 94 BPM
EOSINOPHILS ABSOLUTE: 0 K/UL (ref 0–0.6)
EOSINOPHILS RELATIVE PERCENT: 0.1 %
EPITHELIAL CELLS, UA: 2 /HPF (ref 0–5)
GFR SERPL CREATININE-BSD FRML MDRD: >60 ML/MIN/{1.73_M2}
GLUCOSE BLD-MCNC: 146 MG/DL (ref 70–99)
GLUCOSE BLD-MCNC: 159 MG/DL (ref 70–99)
GLUCOSE BLD-MCNC: 189 MG/DL (ref 70–99)
GLUCOSE URINE: NEGATIVE MG/DL
HCG QUALITATIVE: NEGATIVE
HCT VFR BLD CALC: 42 % (ref 36–48)
HEMOGLOBIN: 13.7 G/DL (ref 12–16)
HYALINE CASTS: 1 /LPF (ref 0–8)
KETONES, URINE: 80 MG/DL
LACTIC ACID: 3.4 MMOL/L (ref 0.4–2)
LACTIC ACID: 3.5 MMOL/L (ref 0.4–2)
LEUKOCYTE ESTERASE, URINE: NEGATIVE
LIPASE: 42 U/L (ref 13–60)
LYMPHOCYTES ABSOLUTE: 1.4 K/UL (ref 1–5.1)
LYMPHOCYTES RELATIVE PERCENT: 9.7 %
MCH RBC QN AUTO: 28.1 PG (ref 26–34)
MCHC RBC AUTO-ENTMCNC: 32.6 G/DL (ref 31–36)
MCV RBC AUTO: 86.3 FL (ref 80–100)
MICROSCOPIC EXAMINATION: YES
MONOCYTES ABSOLUTE: 0.3 K/UL (ref 0–1.3)
MONOCYTES RELATIVE PERCENT: 2 %
NEUTROPHILS ABSOLUTE: 12.9 K/UL (ref 1.7–7.7)
NEUTROPHILS RELATIVE PERCENT: 87.8 %
NITRITE, URINE: NEGATIVE
PDW BLD-RTO: 15.9 % (ref 12.4–15.4)
PERFORMED ON: ABNORMAL
PERFORMED ON: ABNORMAL
PH UA: 6 (ref 5–8)
PLATELET # BLD: 315 K/UL (ref 135–450)
PMV BLD AUTO: 9.2 FL (ref 5–10.5)
POTASSIUM REFLEX MAGNESIUM: 4.3 MMOL/L (ref 3.5–5.1)
PROTEIN UA: 30 MG/DL
RBC # BLD: 4.86 M/UL (ref 4–5.2)
RBC UA: 4 /HPF (ref 0–4)
SODIUM BLD-SCNC: 137 MMOL/L (ref 136–145)
SPECIFIC GRAVITY UA: 1.02 (ref 1–1.03)
TOTAL PROTEIN: 8.1 G/DL (ref 6.4–8.2)
URINE REFLEX TO CULTURE: ABNORMAL
URINE TYPE: ABNORMAL
UROBILINOGEN, URINE: 0.2 E.U./DL
WBC # BLD: 14.7 K/UL (ref 4–11)
WBC UA: 0 /HPF (ref 0–5)

## 2023-01-24 PROCEDURE — 81001 URINALYSIS AUTO W/SCOPE: CPT

## 2023-01-24 PROCEDURE — 93005 ELECTROCARDIOGRAM TRACING: CPT | Performed by: NURSE PRACTITIONER

## 2023-01-24 PROCEDURE — 99285 EMERGENCY DEPT VISIT HI MDM: CPT

## 2023-01-24 PROCEDURE — 2580000003 HC RX 258: Performed by: STUDENT IN AN ORGANIZED HEALTH CARE EDUCATION/TRAINING PROGRAM

## 2023-01-24 PROCEDURE — 2580000003 HC RX 258: Performed by: NURSE PRACTITIONER

## 2023-01-24 PROCEDURE — 84703 CHORIONIC GONADOTROPIN ASSAY: CPT

## 2023-01-24 PROCEDURE — 85025 COMPLETE CBC W/AUTO DIFF WBC: CPT

## 2023-01-24 PROCEDURE — 6360000002 HC RX W HCPCS: Performed by: STUDENT IN AN ORGANIZED HEALTH CARE EDUCATION/TRAINING PROGRAM

## 2023-01-24 PROCEDURE — 1200000000 HC SEMI PRIVATE

## 2023-01-24 PROCEDURE — 6360000002 HC RX W HCPCS: Performed by: NURSE PRACTITIONER

## 2023-01-24 PROCEDURE — 80053 COMPREHEN METABOLIC PANEL: CPT

## 2023-01-24 PROCEDURE — 6370000000 HC RX 637 (ALT 250 FOR IP): Performed by: NURSE PRACTITIONER

## 2023-01-24 PROCEDURE — 83690 ASSAY OF LIPASE: CPT

## 2023-01-24 PROCEDURE — 36415 COLL VENOUS BLD VENIPUNCTURE: CPT

## 2023-01-24 PROCEDURE — 87040 BLOOD CULTURE FOR BACTERIA: CPT

## 2023-01-24 PROCEDURE — 93010 ELECTROCARDIOGRAM REPORT: CPT | Performed by: INTERNAL MEDICINE

## 2023-01-24 PROCEDURE — 96365 THER/PROPH/DIAG IV INF INIT: CPT

## 2023-01-24 PROCEDURE — 6360000004 HC RX CONTRAST MEDICATION: Performed by: NURSE PRACTITIONER

## 2023-01-24 PROCEDURE — 96375 TX/PRO/DX INJ NEW DRUG ADDON: CPT

## 2023-01-24 PROCEDURE — 76705 ECHO EXAM OF ABDOMEN: CPT

## 2023-01-24 PROCEDURE — 83605 ASSAY OF LACTIC ACID: CPT

## 2023-01-24 PROCEDURE — 74177 CT ABD & PELVIS W/CONTRAST: CPT

## 2023-01-24 RX ORDER — PANTOPRAZOLE SODIUM 40 MG/1
40 TABLET, DELAYED RELEASE ORAL 2 TIMES DAILY
Status: DISCONTINUED | OUTPATIENT
Start: 2023-01-25 | End: 2023-01-29 | Stop reason: HOSPADM

## 2023-01-24 RX ORDER — 0.9 % SODIUM CHLORIDE 0.9 %
1000 INTRAVENOUS SOLUTION INTRAVENOUS ONCE
Status: COMPLETED | OUTPATIENT
Start: 2023-01-24 | End: 2023-01-24

## 2023-01-24 RX ORDER — NALOXONE HYDROCHLORIDE 0.4 MG/ML
0.4 INJECTION, SOLUTION INTRAMUSCULAR; INTRAVENOUS; SUBCUTANEOUS PRN
Status: DISCONTINUED | OUTPATIENT
Start: 2023-01-24 | End: 2023-01-29 | Stop reason: HOSPADM

## 2023-01-24 RX ORDER — SODIUM CHLORIDE 9 MG/ML
INJECTION, SOLUTION INTRAVENOUS PRN
Status: DISCONTINUED | OUTPATIENT
Start: 2023-01-24 | End: 2023-01-29 | Stop reason: HOSPADM

## 2023-01-24 RX ORDER — SODIUM CHLORIDE 0.9 % (FLUSH) 0.9 %
5-40 SYRINGE (ML) INJECTION PRN
Status: DISCONTINUED | OUTPATIENT
Start: 2023-01-24 | End: 2023-01-29 | Stop reason: HOSPADM

## 2023-01-24 RX ORDER — LOSARTAN POTASSIUM 25 MG/1
25 TABLET ORAL DAILY
Status: DISCONTINUED | OUTPATIENT
Start: 2023-01-25 | End: 2023-01-29 | Stop reason: HOSPADM

## 2023-01-24 RX ORDER — ALBUTEROL SULFATE 90 UG/1
2 AEROSOL, METERED RESPIRATORY (INHALATION) EVERY 4 HOURS PRN
Status: DISCONTINUED | OUTPATIENT
Start: 2023-01-24 | End: 2023-01-29 | Stop reason: HOSPADM

## 2023-01-24 RX ORDER — HYDRALAZINE HYDROCHLORIDE 20 MG/ML
5 INJECTION INTRAMUSCULAR; INTRAVENOUS EVERY 4 HOURS PRN
Status: DISCONTINUED | OUTPATIENT
Start: 2023-01-24 | End: 2023-01-29 | Stop reason: HOSPADM

## 2023-01-24 RX ORDER — SODIUM CHLORIDE 9 MG/ML
INJECTION, SOLUTION INTRAVENOUS CONTINUOUS
Status: ACTIVE | OUTPATIENT
Start: 2023-01-24 | End: 2023-01-25

## 2023-01-24 RX ORDER — DEXTROSE MONOHYDRATE 100 MG/ML
INJECTION, SOLUTION INTRAVENOUS CONTINUOUS PRN
Status: DISCONTINUED | OUTPATIENT
Start: 2023-01-24 | End: 2023-01-29 | Stop reason: HOSPADM

## 2023-01-24 RX ORDER — 0.9 % SODIUM CHLORIDE 0.9 %
30 INTRAVENOUS SOLUTION INTRAVENOUS ONCE
Status: COMPLETED | OUTPATIENT
Start: 2023-01-24 | End: 2023-01-24

## 2023-01-24 RX ORDER — MONTELUKAST SODIUM 10 MG/1
10 TABLET ORAL DAILY
Status: DISCONTINUED | OUTPATIENT
Start: 2023-01-25 | End: 2023-01-29 | Stop reason: HOSPADM

## 2023-01-24 RX ORDER — ONDANSETRON 4 MG/1
4 TABLET, ORALLY DISINTEGRATING ORAL ONCE
Status: COMPLETED | OUTPATIENT
Start: 2023-01-24 | End: 2023-01-24

## 2023-01-24 RX ORDER — FERROUS SULFATE 325(65) MG
325 TABLET ORAL 2 TIMES DAILY
COMMUNITY

## 2023-01-24 RX ORDER — INSULIN LISPRO 100 [IU]/ML
0-4 INJECTION, SOLUTION INTRAVENOUS; SUBCUTANEOUS EVERY 4 HOURS
Status: DISCONTINUED | OUTPATIENT
Start: 2023-01-24 | End: 2023-01-25

## 2023-01-24 RX ORDER — MORPHINE SULFATE 4 MG/ML
4 INJECTION, SOLUTION INTRAMUSCULAR; INTRAVENOUS ONCE
Status: COMPLETED | OUTPATIENT
Start: 2023-01-24 | End: 2023-01-24

## 2023-01-24 RX ORDER — ACETAMINOPHEN 650 MG/1
650 SUPPOSITORY RECTAL EVERY 6 HOURS PRN
Status: DISCONTINUED | OUTPATIENT
Start: 2023-01-24 | End: 2023-01-28

## 2023-01-24 RX ORDER — SODIUM CHLORIDE 0.9 % (FLUSH) 0.9 %
5-40 SYRINGE (ML) INJECTION EVERY 12 HOURS SCHEDULED
Status: DISCONTINUED | OUTPATIENT
Start: 2023-01-24 | End: 2023-01-29 | Stop reason: HOSPADM

## 2023-01-24 RX ORDER — ACETAMINOPHEN 325 MG/1
650 TABLET ORAL EVERY 6 HOURS PRN
Status: DISCONTINUED | OUTPATIENT
Start: 2023-01-24 | End: 2023-01-28

## 2023-01-24 RX ORDER — DICYCLOMINE HYDROCHLORIDE 10 MG/1
10 CAPSULE ORAL ONCE
Status: COMPLETED | OUTPATIENT
Start: 2023-01-24 | End: 2023-01-24

## 2023-01-24 RX ORDER — FLUTICASONE PROPIONATE 110 UG/1
2 AEROSOL, METERED RESPIRATORY (INHALATION) DAILY
Status: DISCONTINUED | OUTPATIENT
Start: 2023-01-25 | End: 2023-01-29 | Stop reason: HOSPADM

## 2023-01-24 RX ORDER — ENOXAPARIN SODIUM 100 MG/ML
30 INJECTION SUBCUTANEOUS 2 TIMES DAILY
Status: DISCONTINUED | OUTPATIENT
Start: 2023-01-25 | End: 2023-01-29 | Stop reason: HOSPADM

## 2023-01-24 RX ADMIN — ONDANSETRON 4 MG: 4 TABLET, ORALLY DISINTEGRATING ORAL at 14:37

## 2023-01-24 RX ADMIN — VANCOMYCIN HYDROCHLORIDE 1500 MG: 1.5 INJECTION, POWDER, LYOPHILIZED, FOR SOLUTION INTRAVENOUS at 19:59

## 2023-01-24 RX ADMIN — IOPAMIDOL 75 ML: 755 INJECTION, SOLUTION INTRAVENOUS at 15:57

## 2023-01-24 RX ADMIN — HYDROMORPHONE HYDROCHLORIDE 0.5 MG: 1 INJECTION, SOLUTION INTRAMUSCULAR; INTRAVENOUS; SUBCUTANEOUS at 21:10

## 2023-01-24 RX ADMIN — HYDROMORPHONE HYDROCHLORIDE 1 MG: 1 INJECTION, SOLUTION INTRAMUSCULAR; INTRAVENOUS; SUBCUTANEOUS at 18:23

## 2023-01-24 RX ADMIN — SODIUM CHLORIDE 1000 ML: 9 INJECTION, SOLUTION INTRAVENOUS at 14:38

## 2023-01-24 RX ADMIN — MORPHINE SULFATE 4 MG: 4 INJECTION, SOLUTION INTRAMUSCULAR; INTRAVENOUS at 16:11

## 2023-01-24 RX ADMIN — ALUMINUM HYDROXIDE, MAGNESIUM HYDROXIDE, AND SIMETHICONE: 200; 200; 20 SUSPENSION ORAL at 14:36

## 2023-01-24 RX ADMIN — SODIUM CHLORIDE 4182 ML: 9 INJECTION, SOLUTION INTRAVENOUS at 16:08

## 2023-01-24 RX ADMIN — PIPERACILLIN AND TAZOBACTAM 3375 MG: 3; .375 INJECTION, POWDER, LYOPHILIZED, FOR SOLUTION INTRAVENOUS at 18:24

## 2023-01-24 RX ADMIN — DICYCLOMINE HYDROCHLORIDE 10 MG: 10 CAPSULE ORAL at 14:37

## 2023-01-24 RX ADMIN — SODIUM CHLORIDE, PRESERVATIVE FREE 10 ML: 5 INJECTION INTRAVENOUS at 23:19

## 2023-01-24 RX ADMIN — SODIUM CHLORIDE: 9 INJECTION, SOLUTION INTRAVENOUS at 21:20

## 2023-01-24 ASSESSMENT — PAIN SCALES - GENERAL
PAINLEVEL_OUTOF10: 6
PAINLEVEL_OUTOF10: 8
PAINLEVEL_OUTOF10: 10
PAINLEVEL_OUTOF10: 10
PAINLEVEL_OUTOF10: 5

## 2023-01-24 ASSESSMENT — PAIN DESCRIPTION - LOCATION
LOCATION: ABDOMEN

## 2023-01-24 ASSESSMENT — ENCOUNTER SYMPTOMS
BACK PAIN: 0
NAUSEA: 1
ANAL BLEEDING: 0
COUGH: 0
DIARRHEA: 0
EYE PAIN: 0
SORE THROAT: 0
SHORTNESS OF BREATH: 0
ABDOMINAL PAIN: 1
VOMITING: 1

## 2023-01-24 ASSESSMENT — PAIN DESCRIPTION - DESCRIPTORS: DESCRIPTORS: OTHER (COMMENT)

## 2023-01-24 ASSESSMENT — PAIN DESCRIPTION - ORIENTATION
ORIENTATION: RIGHT
ORIENTATION: INNER;LOWER

## 2023-01-24 ASSESSMENT — PAIN - FUNCTIONAL ASSESSMENT: PAIN_FUNCTIONAL_ASSESSMENT: 0-10

## 2023-01-24 NOTE — LETTER
[address] [facility] [address]  Phone: [phone]        January 29, 2023           Patient: Jaden Weaver   MR Number: [id number]   YOB: 1975   Date of Visit: 1/24/2023-1/29/2023       To whom it may concern     Jaden Weaver was admitted to our facility 1/24/23-1/29/23. She may return to work on Tuesday 1/31/23. If you have questions, please do not hesitate to call .     Sincerely,    Rhonda HERNANDEZ

## 2023-01-24 NOTE — CONSULTS
Clinical Pharmacy Note  Vancomycin Consult    Pharmacy consult received for one-time dose of vancomycin in the Emergency Department per Sandie Morales NP. Ht Readings from Last 1 Encounters:   01/24/23 5' 7\" (1.702 m)        Wt Readings from Last 1 Encounters:   01/24/23 (!) 307 lb 5.1 oz (139.4 kg)         Assessment/Plan:  Vancomycin 1500 mg x 1 in ED. If Vancomycin is to continue on admission and pharmacy is to manage dosing, please re-consult with admission orders.     Maddie Perez [facility], PharmD, 1/24/2023 6:58 PM

## 2023-01-24 NOTE — ED NOTES
Noni KENNY made aware patient moaning in pain. States he will go assess her.       Natasha Martines RN  01/24/23 4448

## 2023-01-24 NOTE — ED PROVIDER NOTES
[address] [facility]        Pt Name: Andres Montes  MRN: [id number]  [place] 1975  Date of evaluation: 1/24/2023  Provider: OC Orozco - ROSA ELENA  PCP: Conrad Westfall MD  Note Started: 3:24 PM EST 1/24/23      CARIE. I have evaluated this patient. My supervising physician was available for consultation. CHIEF COMPLAINT       Chief Complaint   Patient presents with    Abdominal Pain     Stabbing pain in center of stomach starting 0830. Emesis on going since. \"Pain wont go away\"        HISTORY OF PRESENT ILLNESS: 1 or more Elements     History from : Patient    Limitations to history : None    Andres Montes is a 41 y.o. [place], well-appearing, mildly distressed female with medical history including, not limited to, dysfunctional uterine bleeding, and hypertension, who presents presents to the emergency department with a cute [email or web address] hrs. this a.m. with \"stabbing\" 10/10 mid/epigastric abdominal pain accompanied by nausea and vomiting x7-8, lightheadedness, presyncope, and diaphoresis. Denies chest pain, dizziness, shortness of breath, fever, chills, cough, change in ability to smell/taste, hemoptysis, leg/calf pain or swelling, headaches, body aches, diarrhea, urinary symptoms/retention, other concerns. Denies history of of abdominal surgeries. The pain does not radiate. Nursing Notes were all reviewed and agreed with or any disagreements were addressed in the HPI. REVIEW OF SYSTEMS :      Review of Systems   Constitutional:  Positive for diaphoresis. Negative for chills, fatigue and fever. HENT:  Negative for congestion and sore throat. Eyes:  Negative for pain and visual disturbance. Respiratory:  Negative for cough and shortness of breath. Cardiovascular:  Negative for chest pain and leg swelling. Gastrointestinal:  Positive for abdominal pain, nausea and vomiting. Negative for anal bleeding and diarrhea. Genitourinary:  Negative for difficulty urinating, dysuria, flank pain, frequency and urgency. Musculoskeletal:  Negative for back pain and neck pain. Skin:  Negative for rash and wound. Neurological:  Positive for light-headedness. Negative for dizziness and syncope (+ Presyncope). Positives and Pertinent negatives as per HPI. SURGICAL HISTORY     Past Surgical History:   Procedure Laterality Date    ANKLE SURGERY Left     ENDOSCOPY, COLON, DIAGNOSTIC      MOUTH SURGERY  2016    gingival auto graft       CURRENTMEDICATIONS       Previous Medications    ACCU-CHEK SOFTCLIX LANCETS MISC    Test twice daily    ALBUTEROL SULFATE  (90 BASE) MCG/ACT INHALER    Inhale 2 puffs into the lungs every 4 hours as needed for Wheezing May substitute for insurance preferred (Ventolin, Proventil, ProAir)    AZELASTINE (ASTELIN) 0.1 % NASAL SPRAY    1 spray by Nasal route 2 times daily Use in each nostril as directed    BLOOD GLUCOSE MONITOR KIT AND SUPPLIES    Dispense sufficient amount for indicated testing frequency plus additional to accommodate PRN testing needs. Dispense all needed supplies to include: monitor, strips, lancing device, lancets, control solutions, alcohol swabs. BLOOD GLUCOSE MONITORING SUPPL (PRODIGY POCKET BLOOD GLUCOSE) W/DEVICE KIT    as needed    BLOOD GLUCOSE TEST STRIPS (ASCENSIA AUTODISC VI;ONE TOUCH ULTRA TEST VI) STRIP    1 each by In Vitro route 2 times daily As needed. CANNABIDIOL 100 MG/ML SOLN    PRN anxiety    CLOBETASOL (TEMOVATE) 0.05 % EXTERNAL SOLUTION    Apply topically 2 times daily. CLOTRIMAZOLE-BETAMETHASONE (LOTRISONE) 1-0.05 % CREAM    Apply topically 2 times daily. DESONIDE (DESOWEN) 0.05 % CREAM    Apply topically 2 times daily.     DICYCLOMINE (BENTYL) 20 MG TABLET    TAKE 1 TABLET BY MOUTH THREE TIMES DAILY AS NEEDED FOR CRAMPING    DULAGLUTIDE (TRULICITY) 1.5 LY/6.0JQ SC INJECTION    ADMINISTER 1.5 MG UNDER THE SKIN 1 TIME A WEEK    FLOVENT DISKUS 100 MCG/BLIST AEPB INHALER    INHALE 2 PUFFS INTO THE LUNGS DAILY    FLUTICASONE (FLONASE) 50 MCG/ACT NASAL SPRAY    1 spray by Nasal route daily    LANCET DEVICES (PRODIGY LANCING DEVICE) MISC    as needed    LORATADINE (CLARITIN) 10 MG TABLET    Take 10 mg by mouth daily. LOSARTAN (COZAAR) 50 MG TABLET    TAKE 1 TABLET EVERY        MORNING(NEW DOSAGE)    METFORMIN (GLUCOPHAGE-XR) 500 MG EXTENDED RELEASE TABLET    Take 4 tablets daily with breakfast    MONTELUKAST (SINGULAIR) 10 MG TABLET    Take one tablet daily    NORETHINDRONE-ETHINYL ESTRADIOL (ORTHO-NOVUM 7/7/7, 28,) 0.5/0.75/1-35 MG-MCG PER TABLET    Take 1 tablet by mouth daily    PANTOPRAZOLE (PROTONIX) 40 MG TABLET    TAKE 1 TABLET BY MOUTH TWICE DAILY    PIMECROLIMUS (ELIDEL) 1 % CREAM    Apply topically 2 times daily. PRODIGY LANCETS 28G MISC    1 each by Does not apply route 2 times daily    SERTRALINE (ZOLOFT) 50 MG TABLET    Take one tablet daily       ALLERGIES     Fluoxetine, Lisinopril, Oseltamivir phosphate, Lexapro [escitalopram oxalate], Methylisothiazolinone, and Sulfa antibiotics    FAMILYHISTORY       Family History   Problem Relation Age of Onset    Alzheimer's Disease Father     Cancer Sister         peritoneal    Other Brother         SARA    Diabetes Maternal Grandmother         SOCIAL HISTORY       Social History     Tobacco Use    Smoking status: Never    Smokeless tobacco: Never    Tobacco comments:     advised not to start   Vaping Use    Vaping Use: Never used   Substance Use Topics    Alcohol use:  Yes     Alcohol/week: 1.0 standard drink     Types: 1 Cans of beer per week    Drug use: No       SCREENINGS        Tito Coma Scale  Eye Opening: Spontaneous  Best Verbal Response: Oriented  Best Motor Response: Obeys commands  Tito Coma Scale Score: 15                CIWA Assessment  BP: (!) 154/82  Heart Rate: (!) 103           PHYSICAL EXAM  1 or more Elements     ED Triage Vitals [01/24/23 1327]   BP Temp Temp Source Heart Rate Resp SpO2 Height Weight   (!) 154/82 97.4 °F (36.3 °C) Oral (!) 103 22 99 % 5' 7\" (1.702 m) (!) 307 lb 5.1 oz (139.4 kg)       Physical Exam  Vitals and nursing note reviewed. Constitutional:       General: She is in acute distress. Appearance: Normal appearance. She is obese. She is not ill-appearing, toxic-appearing or diaphoretic. HENT:      Head: Normocephalic and atraumatic. Right Ear: External ear normal.      Left Ear: External ear normal.      Nose: Nose normal.      Mouth/Throat:      Mouth: Mucous membranes are moist.   Eyes:      General:         Right eye: No discharge. Left eye: No discharge. Cardiovascular:      Rate and Rhythm: Tachycardia present. Pulmonary:      Effort: Pulmonary effort is normal. No respiratory distress. Abdominal:      General: Bowel sounds are decreased. Tenderness: There is abdominal tenderness. Musculoskeletal:         General: Normal range of motion. Cervical back: Normal range of motion and neck supple. Skin:     General: Skin is warm and dry. Neurological:      Mental Status: She is alert and oriented to person, place, and time.    Psychiatric:         Mood and Affect: Mood normal.         Behavior: Behavior normal.       DIAGNOSTIC RESULTS   LABS:    I have reviewed and interpreted all of the currently available lab results from this visit:  Results for orders placed or performed during the hospital encounter of 01/24/23   HCG Qualitative, Serum   Result Value Ref Range    hCG Qual Negative Detects HCG level >10 MIU/mL   CBC with Auto Differential   Result Value Ref Range    WBC 14.7 (H) 4.0 - 11.0 K/uL    RBC 4.86 4.00 - 5.20 M/uL    Hemoglobin 13.7 12.0 - 16.0 g/dL    Hematocrit 42.0 36.0 - 48.0 %    MCV 86.3 80.0 - 100.0 fL    MCH 28.1 26.0 - 34.0 pg    MCHC 32.6 31.0 - 36.0 g/dL    RDW 15.9 (H) 12.4 - 15.4 %    Platelets 300 876 - 936 K/uL    MPV 9.2 5.0 - 10.5 fL    Neutrophils % 87.8 %    Lymphocytes % 9.7 %    Monocytes % 2.0 %    Eosinophils % 0.1 %    Basophils % 0.4 %    Neutrophils Absolute 12.9 (H) 1.7 - 7.7 K/uL    Lymphocytes Absolute 1.4 1.0 - 5.1 K/uL    Monocytes Absolute 0.3 0.0 - 1.3 K/uL    Eosinophils Absolute 0.0 0.0 - 0.6 K/uL    Basophils Absolute 0.1 0.0 - 0.2 K/uL   Comprehensive Metabolic Panel w/ Reflex to MG   Result Value Ref Range    Sodium 137 136 - 145 mmol/L    Potassium reflex Magnesium 4.3 3.5 - 5.1 mmol/L    Chloride 98 (L) 99 - 110 mmol/L    CO2 18 (L) 21 - 32 mmol/L    Anion Gap 21 (H) 3 - 16    Glucose 189 (H) 70 - 99 mg/dL    BUN 17 7 - 20 mg/dL    Creatinine 0.6 0.6 - 1.1 mg/dL    Est, Glom Filt Rate >60 >60    Calcium 10.2 8.3 - 10.6 mg/dL    Total Protein 8.1 6.4 - 8.2 g/dL    Albumin 3.9 3.4 - 5.0 g/dL    Albumin/Globulin Ratio 0.9 (L) 1.1 - 2.2    Total Bilirubin 0.3 0.0 - 1.0 mg/dL    Alkaline Phosphatase 130 (H) 40 - 129 U/L    ALT 20 10 - 40 U/L    AST 27 15 - 37 U/L   Lipase   Result Value Ref Range    Lipase 42.0 13.0 - 60.0 U/L   Urinalysis with Reflex to Culture    Specimen: Urine, clean catch   Result Value Ref Range    Color, UA Yellow Straw/Yellow    Clarity, UA Clear Clear    Glucose, Ur Negative Negative mg/dL    Bilirubin Urine Negative Negative    Ketones, Urine 80 (A) Negative mg/dL    Specific Gravity, UA 1.022 1.005 - 1.030    Blood, Urine SMALL (A) Negative    pH, UA 6.0 5.0 - 8.0    Protein, UA 30 (A) Negative mg/dL    Urobilinogen, Urine 0.2 <2.0 E.U./dL    Nitrite, Urine Negative Negative    Leukocyte Esterase, Urine Negative Negative    Microscopic Examination YES     Urine Type Cleancatch     Urine Reflex to Culture Not Indicated    Lactic Acid   Result Value Ref Range    Lactic Acid 3.5 (H) 0.4 - 2.0 mmol/L   Microscopic Urinalysis   Result Value Ref Range    Bacteria, UA None Seen None Seen /HPF    Hyaline Casts, UA 1 0 - 8 /LPF    WBC, UA 0 0 - 5 /HPF    RBC, UA 4 0 - 4 /HPF    Epithelial Cells, UA 2 0 - 5 /HPF   Lactic Acid   Result Value Ref Range    Lactic Acid 3.4 (H) 0.4 - 2.0 mmol/L   POCT Glucose   Result Value Ref Range    POC Glucose 159 (H) 70 - 99 mg/dl    Performed on ACCU-CHEK    POCT Glucose   Result Value Ref Range    POC Glucose 146 (H) 70 - 99 mg/dl    Performed on ACCU-CHEK    EKG 12 Lead   Result Value Ref Range    Ventricular Rate 94 BPM    Atrial Rate 94 BPM    P-R Interval 134 ms    QRS Duration 86 ms    Q-T Interval 370 ms    QTc Calculation (Bazett) 463 ms    P Axis 31 degrees    R Axis 19 degrees    T Axis 10 degrees    Diagnosis       Normal sinus rhythmNormal ECGWhen compared with ECG of 22-FEB-2020 15:18,No significant change was foundConfirmed by MIGUELITO PEREZ, Harman Hernandez ([id number]) on 1/24/2023 4:05:19 PM         When ordered only abnormal lab results are displayed. All other labs were within normal range or not returned as of this dictation. EKG: When ordered, EKG's are interpreted by the Emergency Department Physician in the absence of a cardiologist.  Please see their note for interpretation of EKG. RADIOLOGY:   Non-plain film images such as CT, Ultrasound and MRI are read by the radiologist. Plain radiographic images are visualized and preliminarily interpreted by the ED Provider with the below findings:      Interpretation per the Radiologist below, if available at the time of this note:    CT ABDOMEN PELVIS W IV CONTRAST Additional Contrast? None    Result Date: 1/24/2023  Diffuse hepatic steatosis. Mildly distended gallbladder with cholelithiasis. US GALLBLADDER RUQ    Result Date: 1/24/2023  Cholelithiasis without evidence of cholecystitis. Increased echogenicity of the liver compatible with diffuse hepatocellular disease, most commonly due to hepatic steatosis. PROCEDURES   Unless otherwise noted below, none     Procedures    CRITICAL CARE TIME (.cctime)   CRITICAL CARE NOTE:  There was a high probability of clinically significant life-threatening deterioration of the patient's condition requiring my urgent intervention.     Total critical care time was at least 41 minutes. This includes vital sign monitoring, pulse oximetry monitoring, telemetry monitoring,  clinical response to the IV medications, reviewing the nursing notes, consultation time, dictation/documentation time, and interpretation of the labwork. This excludes any separately billable procedures performed. PAST MEDICAL HISTORY     Mrs. 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 Chronic Conditions affecting Care: None    EMERGENCY DEPARTMENT COURSE and DIFFERENTIAL DIAGNOSIS/MDM:   Vitals:    Vitals:    01/24/23 1327   BP: (!) 154/82   Pulse: (!) 103   Resp: 22   Temp: 97.4 °F (36.3 °C)   TempSrc: Oral   SpO2: 99%   Weight: (!) 307 lb 5.1 oz (139.4 kg)   Height: 5' 7\" (1.702 m)       Alternate diagnoses were considered less likely based on history and physical.  Considered kidney stone, pyelonephritis, UTI, appendicitis, bowel obstruction, diverticulitis, hernia, gastritis/gastroenteritis, pancreatitis, cholecystitis, hepatitis, constipation, IBS, IBD, other    Jaden Pi is a 41 y.o. [place], well-appearing, mildly distressed female with medical history including, not limited to, dysfunctional uterine bleeding, and hypertension, who presents presents to the emergency department with a [email or web address] hrs. this a.m. with \"stabbing\" 10/10 mid/epigastric abdominal pain accompanied by nausea and vomiting x7-8, lightheadedness, presyncope, and diaphoresis.   Denies chest pain, dizziness, shortness of breath, fever, chills, cough, change in ability to smell/taste, hemoptysis, leg/calf pain or swelling, headaches, body aches, diarrhea, urinary symptoms/retention, other concerns. Denies history of of abdominal surgeries. The pain does not radiate. We will obtain urine reflex to culture, urine microscopic, lactic acid, hCG qualitative, CBC, CMP, lipase, Right upper quadrant gallbladder ultrasound, and EKG. patient's labs were positive for leukocytosis with elevated lactic acid. Therefore obtained CT abdomen pelvis with contrast to rule out intra-abdominal pathology. Labs pending. Patient medicated as below.       Patient was given the following medications:  Medications   sodium chloride flush 0.9 % injection 5-40 mL (10 mLs IntraVENous Given 1/24/23 5771)   sodium chloride flush 0.9 % injection 5-40 mL (has no administration in time range)   0.9 % sodium chloride infusion (has no administration in time range)   enoxaparin Sodium (LOVENOX) injection 30 mg (has no administration in time range)   acetaminophen (TYLENOL) tablet 650 mg (650 mg Oral Given 1/25/23 0117)     Or   acetaminophen (TYLENOL) suppository 650 mg ( Rectal See Alternative 1/25/23 0117)   piperacillin-tazobactam (ZOSYN) 3,375 mg in dextrose 5 % 50 mL IVPB (mini-bag) (3,375 mg IntraVENous New Bag 1/25/23 0326)   albuterol sulfate HFA (PROVENTIL;VENTOLIN;PROAIR) 108 (90 Base) MCG/ACT inhaler 2 puff (has no administration in time range)   fluticasone (FLOVENT HFA) 110 MCG/ACT inhaler 2 puff (has no administration in time range)   losartan (COZAAR) tablet 25 mg (has no administration in time range)   montelukast (SINGULAIR) tablet 10 mg (has no administration in time range)   pantoprazole (PROTONIX) tablet 40 mg (has no administration in time range)   insulin lispro (HUMALOG) injection vial 0-4 Units (0 Units SubCUTAneous Not Given 1/25/23 8438)   glucose chewable tablet 16 g (has no administration in time range)   dextrose bolus 10% 125 mL (has no administration in time range)     Or   dextrose bolus 10% 250 mL (has no administration in time range) glucagon (rDNA) injection 1 mg (has no administration in time range)   dextrose 10 % infusion (has no administration in time range)   0.9 % sodium chloride infusion ( IntraVENous New Bag 1/24/23 2120)   HYDROmorphone (DILAUDID) injection 0.25 mg ( IntraVENous See Alternative 1/25/23 0510)     Or   HYDROmorphone (DILAUDID) injection 0.5 mg (0.5 mg IntraVENous Given 1/25/23 0510)   naloxone (NARCAN) injection 0.4 mg (has no administration in time range)   hydrALAZINE (APRESOLINE) injection 5 mg (has no administration in time range)   aluminum & magnesium hydroxide-simethicone (MAALOX) 30 mL, lidocaine viscous hcl (XYLOCAINE) 5 mL (GI COCKTAIL) ( Oral Given 1/24/23 1436)   dicyclomine (BENTYL) capsule 10 mg (10 mg Oral Given 1/24/23 1437)   ondansetron (ZOFRAN-ODT) disintegrating tablet 4 mg (4 mg Oral Given 1/24/23 1437)   0.9 % sodium chloride bolus (0 mLs IntraVENous Stopped 1/24/23 1534)   iopamidol (ISOVUE-370) 76 % injection 75 mL (75 mLs IntraVENous Given 1/24/23 1557)   0.9 % sodium chloride bolus (0 mLs IntraVENous Stopped 1/24/23 2112)   morphine (PF) injection 4 mg (4 mg IntraVENous Given 1/24/23 1611)   piperacillin-tazobactam (ZOSYN) 3,375 mg in dextrose 5 % 50 mL IVPB (mini-bag) (0 mg IntraVENous Stopped 1/24/23 1955)   HYDROmorphone (DILAUDID) injection 1 mg (1 mg IntraVENous Given 1/24/23 1823)   vancomycin (VANCOCIN) 1,500 mg in dextrose 5 % 250 mL IVPB (ADDAVIAL) (0 mg IntraVENous Stopped 1/24/23 2136)           Work-up reveals:  POCT glucose 139 mg/dL  Lactic acid: [email or web address]  Blood culture #1 and 2: Pending  hCG qualitative: Negative  CBC: [email or web address], RDW elevated 15.9, neutrophils absolute elevated at 12.9, otherwise unremarkable  Metabolic panel: Mild hypochloremia at 98, CO2 reduced at 18, anion gap of 21, hyperglycemic at 189 mg/dL, no renal dysfunction, alkaline phosphatase elevated at 130 without other electrolyte derangement  Lipase: [email or web address]  CT A/P: As noted above identified diffuse hepatic steatosis. Mildly distended gallbladder with cholelithiasis. US gallbladder RUQ: As noted above  Cholelithiasis without evidence of cholecystitis. Increased echogenicity of the liver is compatible with diffuse hepatocellular disease, most commonly due to hepatic steatosis  EKG: As noted by EMD, see note for details      Is this patient to be included in the SEP-1 Core Measure due to severe sepsis or septic shock? Yes   SEP-1 CORE MEASURE DATA      Sepsis Criteria   Severe Sepsis Criteria   Septic Shock Criteria     Must be confirmed or suspected to move forward with diagnosis of sepsis. Must meet 2:    [] Temperature > 100.9 F (38.3 C)        or < 96.8 F (36 C)  [x] HR > 90  [x] RR > 20  [x] WBC > 12 or < 4 or 10% bands      AND:      [] Infection Confirmed or        Suspected. Must meet 1:    [x] Lactate > 2       or   [] Signs of Organ Dysfunction:    - SBP < 90 or MAP < 65  - Altered mental status  - Creatinine > 2 or increased from      baseline  - Urine Output < 0.5 ml/kg/hr  - Bilirubin > 2  - INR > 1.5 (not anticoagulated)  - Platelets < 733,859  - Acute Respiratory Failure as     evidenced by new need for NIPPV     or mechanical ventilation      [] No criteria met for Severe Sepsis. Must meet 1:    [] Lactate > 4        or   [] SBP < 90 or MAP < 65 for at        least two readings in the first        hour after fluid bolus        administration      [] Vasopressors initiated (if hypotension persists after fluid resuscitation)        [x] No criteria met for Septic Shock.    Patient Vitals for the past 6 hrs:   BP Temp Pulse Resp SpO2 Height Weight Weight Method Percent Weight Change   01/24/23 1327 (!) 154/82 97.4 °F (36.3 °C) (!) 103 22 99 % 5' 7\" (1.702 m) (!) 307 lb 5.1 oz (139.4 kg) Actual;Bed scale 0      Recent Labs     01/24/23  1339 01/24/23  1429   WBC 14.7*  --    LACTA  --  3.5*   CREATININE 0.6  --    BILITOT 0.3  --      --          Time Severe Sepsis Identified: 1530 hrs    Fluid Resuscitation Rational: at least 30mL/kg based on entered actual weight at time of triage      Repeat lactate level: ordered and pending at this time  Repeat lactic acid: Mildly [email or web address]    Reassessment Exam:   Not applicable. Patient does not have septic shock. CONSULTS: (Who and What was discussed)  None  Discussion with Other Profesionals : None    Social Determinants : None    Records Reviewed : None    CC/HPI Summary, DDx, ED Course, and Reassessment: Patient resting company on stretcher with eyes open and with stable vital signs. Discussion was held with the patient regarding admission. Patient is agreeable to plan for admission. Significant other was present. Opportunity was given to ask questions. All questions were answered. Disposition Considerations (include 1 Tests not done, Shared Decision Making, Pt Expectation of Test or Tx.):   Given the patient is septic/severe sepsis, of unknown origin she will require admission to the hospital for further evaluation treatment including IV fluids and IV antibiotic administration. Therefore, I will consult the hospitalist for admission. I am the Primary Clinician of Record. FINAL IMPRESSION        ICD-10-CM    1. Septicemia ([address]  A41.9       2. Abdominal pain, epigastric  R10.13       3. Calculus of gallbladder without cholecystitis without obstruction  K80.20       4. Hyperglycemia due to diabetes mellitus ([address]  E11.65             DISPOSITION/PLAN     Disposition:  Admitted      Patient will be admitted to hospital for further evaluation and treatment.        Hospitalist: Dr. Sahra Ford      Discussed patients HPI, ED work-up, results, treatment, and response with the Hospitalist -Dr. Sahra Ford who agrees to admit the patient to the hospital.               (Please note that portions of this note were completed with a voice recognition program.  Efforts were made to edit the dictations but occasionally words are mis-transcribed.)    OC Flores CNP (electronically signed)             OC Flores CNP  01/25/23 9202

## 2023-01-25 ENCOUNTER — ANESTHESIA EVENT (OUTPATIENT)
Dept: ENDOSCOPY | Age: 48
End: 2023-01-25
Payer: COMMERCIAL

## 2023-01-25 ENCOUNTER — ANESTHESIA (OUTPATIENT)
Dept: ENDOSCOPY | Age: 48
End: 2023-01-25
Payer: COMMERCIAL

## 2023-01-25 PROBLEM — K80.20 CALCULUS OF GALLBLADDER WITHOUT CHOLECYSTITIS WITHOUT OBSTRUCTION: Status: ACTIVE | Noted: 2023-01-25

## 2023-01-25 LAB
A/G RATIO: 1 (ref 1.1–2.2)
ALBUMIN SERPL-MCNC: 3.3 G/DL (ref 3.4–5)
ALP BLD-CCNC: 102 U/L (ref 40–129)
ALT SERPL-CCNC: 18 U/L (ref 10–40)
ANION GAP SERPL CALCULATED.3IONS-SCNC: 15 MMOL/L (ref 3–16)
AST SERPL-CCNC: 21 U/L (ref 15–37)
BASOPHILS ABSOLUTE: 0 K/UL (ref 0–0.2)
BASOPHILS RELATIVE PERCENT: 0.2 %
BILIRUB SERPL-MCNC: 0.6 MG/DL (ref 0–1)
BUN BLDV-MCNC: 8 MG/DL (ref 7–20)
CALCIUM SERPL-MCNC: 8.2 MG/DL (ref 8.3–10.6)
CHLORIDE BLD-SCNC: 96 MMOL/L (ref 99–110)
CO2: 22 MMOL/L (ref 21–32)
CREAT SERPL-MCNC: 0.6 MG/DL (ref 0.6–1.1)
EOSINOPHILS ABSOLUTE: 0 K/UL (ref 0–0.6)
EOSINOPHILS RELATIVE PERCENT: 0.1 %
ESTIMATED AVERAGE GLUCOSE: 151.3 MG/DL
GFR SERPL CREATININE-BSD FRML MDRD: >60 ML/MIN/{1.73_M2}
GLUCOSE BLD-MCNC: 155 MG/DL (ref 70–99)
GLUCOSE BLD-MCNC: 167 MG/DL (ref 70–99)
GLUCOSE BLD-MCNC: 175 MG/DL (ref 70–99)
GLUCOSE BLD-MCNC: 179 MG/DL (ref 70–99)
GLUCOSE BLD-MCNC: 180 MG/DL (ref 70–99)
GLUCOSE BLD-MCNC: 222 MG/DL (ref 70–99)
GLUCOSE BLD-MCNC: 242 MG/DL (ref 70–99)
HBA1C MFR BLD: 6.9 %
HCT VFR BLD CALC: 36.4 % (ref 36–48)
HEMOGLOBIN: 11.8 G/DL (ref 12–16)
LACTIC ACID: 1.7 MMOL/L (ref 0.4–2)
LYMPHOCYTES ABSOLUTE: 2.2 K/UL (ref 1–5.1)
LYMPHOCYTES RELATIVE PERCENT: 11.8 %
MAGNESIUM: 1.6 MG/DL (ref 1.8–2.4)
MCH RBC QN AUTO: 27.8 PG (ref 26–34)
MCHC RBC AUTO-ENTMCNC: 32.5 G/DL (ref 31–36)
MCV RBC AUTO: 85.5 FL (ref 80–100)
MONOCYTES ABSOLUTE: 1 K/UL (ref 0–1.3)
MONOCYTES RELATIVE PERCENT: 5.4 %
NEUTROPHILS ABSOLUTE: 15.3 K/UL (ref 1.7–7.7)
NEUTROPHILS RELATIVE PERCENT: 82.5 %
PDW BLD-RTO: 16 % (ref 12.4–15.4)
PERFORMED ON: ABNORMAL
PLATELET # BLD: 245 K/UL (ref 135–450)
PMV BLD AUTO: 9.1 FL (ref 5–10.5)
POTASSIUM SERPL-SCNC: 3.3 MMOL/L (ref 3.5–5.1)
RBC # BLD: 4.26 M/UL (ref 4–5.2)
SODIUM BLD-SCNC: 133 MMOL/L (ref 136–145)
TOTAL PROTEIN: 6.6 G/DL (ref 6.4–8.2)
TROPONIN: <0.01 NG/ML
WBC # BLD: 18.6 K/UL (ref 4–11)

## 2023-01-25 PROCEDURE — 83605 ASSAY OF LACTIC ACID: CPT

## 2023-01-25 PROCEDURE — 7100000000 HC PACU RECOVERY - FIRST 15 MIN: Performed by: INTERNAL MEDICINE

## 2023-01-25 PROCEDURE — 36415 COLL VENOUS BLD VENIPUNCTURE: CPT

## 2023-01-25 PROCEDURE — 6370000000 HC RX 637 (ALT 250 FOR IP): Performed by: NURSE PRACTITIONER

## 2023-01-25 PROCEDURE — APPSS15 APP SPLIT SHARED TIME 0-15 MINUTES: Performed by: PHYSICIAN ASSISTANT

## 2023-01-25 PROCEDURE — 0DJ08ZZ INSPECTION OF UPPER INTESTINAL TRACT, VIA NATURAL OR ARTIFICIAL OPENING ENDOSCOPIC: ICD-10-PCS | Performed by: INTERNAL MEDICINE

## 2023-01-25 PROCEDURE — 3609017100 HC EGD: Performed by: INTERNAL MEDICINE

## 2023-01-25 PROCEDURE — 3700000000 HC ANESTHESIA ATTENDED CARE: Performed by: INTERNAL MEDICINE

## 2023-01-25 PROCEDURE — 6360000002 HC RX W HCPCS: Performed by: NURSE ANESTHETIST, CERTIFIED REGISTERED

## 2023-01-25 PROCEDURE — 7100000001 HC PACU RECOVERY - ADDTL 15 MIN: Performed by: INTERNAL MEDICINE

## 2023-01-25 PROCEDURE — 2500000003 HC RX 250 WO HCPCS: Performed by: NURSE ANESTHETIST, CERTIFIED REGISTERED

## 2023-01-25 PROCEDURE — 83036 HEMOGLOBIN GLYCOSYLATED A1C: CPT

## 2023-01-25 PROCEDURE — 6360000002 HC RX W HCPCS: Performed by: NURSE PRACTITIONER

## 2023-01-25 PROCEDURE — 6370000000 HC RX 637 (ALT 250 FOR IP): Performed by: STUDENT IN AN ORGANIZED HEALTH CARE EDUCATION/TRAINING PROGRAM

## 2023-01-25 PROCEDURE — 6360000002 HC RX W HCPCS: Performed by: ANESTHESIOLOGY

## 2023-01-25 PROCEDURE — 84484 ASSAY OF TROPONIN QUANT: CPT

## 2023-01-25 PROCEDURE — 85025 COMPLETE CBC W/AUTO DIFF WBC: CPT

## 2023-01-25 PROCEDURE — 6360000002 HC RX W HCPCS: Performed by: STUDENT IN AN ORGANIZED HEALTH CARE EDUCATION/TRAINING PROGRAM

## 2023-01-25 PROCEDURE — 2709999900 HC NON-CHARGEABLE SUPPLY: Performed by: INTERNAL MEDICINE

## 2023-01-25 PROCEDURE — 83735 ASSAY OF MAGNESIUM: CPT

## 2023-01-25 PROCEDURE — 94760 N-INVAS EAR/PLS OXIMETRY 1: CPT

## 2023-01-25 PROCEDURE — APPNB15 APP NON BILLABLE TIME 0-15 MINS: Performed by: PHYSICIAN ASSISTANT

## 2023-01-25 PROCEDURE — 80053 COMPREHEN METABOLIC PANEL: CPT

## 2023-01-25 PROCEDURE — 1200000000 HC SEMI PRIVATE

## 2023-01-25 PROCEDURE — 2580000003 HC RX 258: Performed by: STUDENT IN AN ORGANIZED HEALTH CARE EDUCATION/TRAINING PROGRAM

## 2023-01-25 PROCEDURE — 99222 1ST HOSP IP/OBS MODERATE 55: CPT | Performed by: SURGERY

## 2023-01-25 RX ORDER — METOCLOPRAMIDE HYDROCHLORIDE 5 MG/ML
INJECTION INTRAMUSCULAR; INTRAVENOUS
Status: COMPLETED
Start: 2023-01-25 | End: 2023-01-25

## 2023-01-25 RX ORDER — INSULIN LISPRO 100 [IU]/ML
0-4 INJECTION, SOLUTION INTRAVENOUS; SUBCUTANEOUS EVERY 4 HOURS
Status: DISCONTINUED | OUTPATIENT
Start: 2023-01-25 | End: 2023-01-29 | Stop reason: HOSPADM

## 2023-01-25 RX ORDER — PROPOFOL 10 MG/ML
INJECTION, EMULSION INTRAVENOUS PRN
Status: DISCONTINUED | OUTPATIENT
Start: 2023-01-25 | End: 2023-01-25 | Stop reason: SDUPTHER

## 2023-01-25 RX ORDER — METOCLOPRAMIDE HYDROCHLORIDE 5 MG/ML
INJECTION INTRAMUSCULAR; INTRAVENOUS PRN
Status: DISCONTINUED | OUTPATIENT
Start: 2023-01-25 | End: 2023-01-25 | Stop reason: SDUPTHER

## 2023-01-25 RX ORDER — POTASSIUM CHLORIDE 20 MEQ/1
40 TABLET, EXTENDED RELEASE ORAL ONCE
Status: COMPLETED | OUTPATIENT
Start: 2023-01-25 | End: 2023-01-25

## 2023-01-25 RX ORDER — MAGNESIUM SULFATE 1 G/100ML
1000 INJECTION INTRAVENOUS ONCE
Status: COMPLETED | OUTPATIENT
Start: 2023-01-25 | End: 2023-01-25

## 2023-01-25 RX ORDER — LIDOCAINE HYDROCHLORIDE 20 MG/ML
INJECTION, SOLUTION EPIDURAL; INFILTRATION; INTRACAUDAL; PERINEURAL PRN
Status: DISCONTINUED | OUTPATIENT
Start: 2023-01-25 | End: 2023-01-25 | Stop reason: SDUPTHER

## 2023-01-25 RX ADMIN — POTASSIUM CHLORIDE 40 MEQ: 1500 TABLET, EXTENDED RELEASE ORAL at 14:45

## 2023-01-25 RX ADMIN — HYDROMORPHONE HYDROCHLORIDE 0.5 MG: 1 INJECTION, SOLUTION INTRAMUSCULAR; INTRAVENOUS; SUBCUTANEOUS at 05:10

## 2023-01-25 RX ADMIN — PIPERACILLIN AND TAZOBACTAM 3375 MG: 3; .375 INJECTION, POWDER, LYOPHILIZED, FOR SOLUTION INTRAVENOUS at 20:58

## 2023-01-25 RX ADMIN — HYDROMORPHONE HYDROCHLORIDE 0.5 MG: 1 INJECTION, SOLUTION INTRAMUSCULAR; INTRAVENOUS; SUBCUTANEOUS at 01:09

## 2023-01-25 RX ADMIN — METOCLOPRAMIDE 10 MG: 5 INJECTION, SOLUTION INTRAMUSCULAR; INTRAVENOUS at 13:52

## 2023-01-25 RX ADMIN — SODIUM CHLORIDE: 9 INJECTION, SOLUTION INTRAVENOUS at 09:14

## 2023-01-25 RX ADMIN — HYDROMORPHONE HYDROCHLORIDE 0.5 MG: 1 INJECTION, SOLUTION INTRAMUSCULAR; INTRAVENOUS; SUBCUTANEOUS at 14:46

## 2023-01-25 RX ADMIN — HYDROMORPHONE HYDROCHLORIDE 0.5 MG: 1 INJECTION, SOLUTION INTRAMUSCULAR; INTRAVENOUS; SUBCUTANEOUS at 09:11

## 2023-01-25 RX ADMIN — PIPERACILLIN AND TAZOBACTAM 3375 MG: 3; .375 INJECTION, POWDER, LYOPHILIZED, FOR SOLUTION INTRAVENOUS at 13:21

## 2023-01-25 RX ADMIN — PIPERACILLIN AND TAZOBACTAM 3375 MG: 3; .375 INJECTION, POWDER, LYOPHILIZED, FOR SOLUTION INTRAVENOUS at 03:26

## 2023-01-25 RX ADMIN — PANTOPRAZOLE SODIUM 40 MG: 40 TABLET, DELAYED RELEASE ORAL at 20:54

## 2023-01-25 RX ADMIN — ACETAMINOPHEN 650 MG: 325 TABLET ORAL at 20:53

## 2023-01-25 RX ADMIN — PROPOFOL 100 MG: 10 INJECTION, EMULSION INTRAVENOUS at 14:00

## 2023-01-25 RX ADMIN — HYDROMORPHONE HYDROCHLORIDE 0.5 MG: 1 INJECTION, SOLUTION INTRAMUSCULAR; INTRAVENOUS; SUBCUTANEOUS at 22:54

## 2023-01-25 RX ADMIN — PROPOFOL 100 MG: 10 INJECTION, EMULSION INTRAVENOUS at 13:58

## 2023-01-25 RX ADMIN — SODIUM CHLORIDE, PRESERVATIVE FREE 10 ML: 5 INJECTION INTRAVENOUS at 20:59

## 2023-01-25 RX ADMIN — ACETAMINOPHEN 650 MG: 325 TABLET ORAL at 01:17

## 2023-01-25 RX ADMIN — INSULIN LISPRO 1 UNITS: 100 INJECTION, SOLUTION INTRAVENOUS; SUBCUTANEOUS at 17:13

## 2023-01-25 RX ADMIN — ENOXAPARIN SODIUM 30 MG: 100 INJECTION SUBCUTANEOUS at 20:54

## 2023-01-25 RX ADMIN — HYDROMORPHONE HYDROCHLORIDE 0.5 MG: 1 INJECTION, SOLUTION INTRAMUSCULAR; INTRAVENOUS; SUBCUTANEOUS at 18:50

## 2023-01-25 RX ADMIN — LIDOCAINE HYDROCHLORIDE 60 MG: 20 INJECTION, SOLUTION EPIDURAL; INFILTRATION; INTRACAUDAL; PERINEURAL at 13:58

## 2023-01-25 RX ADMIN — MAGNESIUM SULFATE HEPTAHYDRATE 1000 MG: 1 INJECTION, SOLUTION INTRAVENOUS at 13:22

## 2023-01-25 ASSESSMENT — PAIN DESCRIPTION - DESCRIPTORS
DESCRIPTORS: ACHING
DESCRIPTORS: SPASM;CRAMPING

## 2023-01-25 ASSESSMENT — PAIN - FUNCTIONAL ASSESSMENT
PAIN_FUNCTIONAL_ASSESSMENT: PREVENTS OR INTERFERES SOME ACTIVE ACTIVITIES AND ADLS
PAIN_FUNCTIONAL_ASSESSMENT: 0-10
PAIN_FUNCTIONAL_ASSESSMENT: PREVENTS OR INTERFERES SOME ACTIVE ACTIVITIES AND ADLS

## 2023-01-25 ASSESSMENT — PAIN DESCRIPTION - ORIENTATION
ORIENTATION: MID
ORIENTATION: RIGHT
ORIENTATION: RIGHT

## 2023-01-25 ASSESSMENT — PAIN DESCRIPTION - LOCATION
LOCATION: ABDOMEN
LOCATION: HEAD
LOCATION: ABDOMEN

## 2023-01-25 ASSESSMENT — PAIN SCALES - GENERAL
PAINLEVEL_OUTOF10: 7
PAINLEVEL_OUTOF10: 4
PAINLEVEL_OUTOF10: 8
PAINLEVEL_OUTOF10: 3
PAINLEVEL_OUTOF10: 3
PAINLEVEL_OUTOF10: 7
PAINLEVEL_OUTOF10: 8
PAINLEVEL_OUTOF10: 8
PAINLEVEL_OUTOF10: 7

## 2023-01-25 ASSESSMENT — PAIN DESCRIPTION - PAIN TYPE: TYPE: ACUTE PAIN

## 2023-01-25 ASSESSMENT — PAIN DESCRIPTION - ONSET: ONSET: AWAKENED FROM SLEEP

## 2023-01-25 ASSESSMENT — PAIN DESCRIPTION - FREQUENCY: FREQUENCY: CONTINUOUS

## 2023-01-25 NOTE — PLAN OF CARE
Problem: Discharge Planning  Goal: Discharge to home or other facility with appropriate resources  Outcome: Progressing     Problem: Pain  Goal: Verbalizes/displays adequate comfort level or baseline comfort level  Outcome: Progressing     Problem: ABCDS Injury Assessment  Goal: Absence of physical injury  Outcome: Progressing     Problem: Gastrointestinal - Adult  Goal: Minimal or absence of nausea and vomiting  Outcome: Progressing  Goal: Maintains or returns to baseline bowel function  Outcome: Progressing  Goal: Maintains adequate nutritional intake  Outcome: Progressing     Problem: Metabolic/Fluid and Electrolytes - Adult  Goal: Electrolytes maintained within normal limits  Outcome: Progressing  Goal: Hemodynamic stability and optimal renal function maintained  Outcome: Progressing  Goal: Glucose maintained within prescribed range  Outcome: Progressing     Problem: Cardiovascular - Adult  Goal: Maintains optimal cardiac output and hemodynamic stability  Outcome: Progressing  Goal: Absence of cardiac dysrhythmias or at baseline  Outcome: Progressing

## 2023-01-25 NOTE — PROGRESS NOTES
Hospitalist Progress Note      PCP: Hugh Odonnell MD    Date of Admission: 1/24/2023    Chief Complaint:   Chief Complaint   Patient presents with    Abdominal Pain     Stabbing pain in center of stomach starting 0830. Emesis on going since. \"Pain wont go away\"      Hospital Course: The patient is a 41 y.o. female with past medical history as below including diabetes and high blood pressure and high cholesterol who presents to [facility] with acute onset and waxing and waning epigastric abdominal pain that is sharp in nature and has been occurring since this past morning. She had not had pain like this before or more recently. She notes that it was associated with decreased appetite throughout the rest of the day and increasing intermittent episodes of nausea and vomiting. At 1 point she did feel as though the abdominal pain was associate with her having vomiting and then feeling lightheaded to the point of passing out but she did not actually pass out. She is uncertain as to what may be causing this abdominal pain but she does not feel as though she ate anything out of the ordinary to trigger the symptoms and no one in her family has had similar symptoms. She denies any other recent symptoms prior to today's abdominal pain nausea vomiting of fever, chills, dizziness, syncope, dysuria, blood in urine/stool/sputum/vomitus, rashes, diarrhea, chest pain, shortness of breath. Subjective:   Patient still having significant abdominal pain. She states that she has history of a sliding hiatal hernia, but states that she has never had pain like this before. Discussed CT abdomen and ultrasound results with the patient. GI is planning for EGD this afternoon. Appreciate their input.       Medications:  Reviewed    Infusion Medications    sodium chloride      dextrose      sodium chloride 100 mL/hr at 01/25/23 9987     Scheduled Medications    sodium chloride flush  5-40 mL IntraVENous 2 times per day enoxaparin  30 mg SubCUTAneous BID    piperacillin-tazobactam  3,375 mg IntraVENous Q8H    fluticasone  2 puff Inhalation Daily    losartan  25 mg Oral Daily    montelukast  10 mg Oral Daily    pantoprazole  40 mg Oral BID    insulin lispro  0-4 Units SubCUTAneous Q4H     PRN Meds: sodium chloride flush, sodium chloride, acetaminophen **OR** acetaminophen, albuterol sulfate HFA, glucose, dextrose bolus **OR** dextrose bolus, glucagon (rDNA), dextrose, HYDROmorphone **OR** HYDROmorphone, naloxone, hydrALAZINE      Intake/Output Summary (Last 24 hours) at 1/25/2023 0963  Last data filed at 1/25/2023 9496  Gross per 24 hour   Intake 0 ml   Output --   Net 0 ml       Physical Exam Performed:    /75   Pulse (!) 110   Temp 98.2 °F (36.8 °C) (Oral)   Resp 18   Ht 5' 7\" (1.702 m)   Wt (!) 312 lb 2.7 oz (141.6 kg)   LMP  (Approximate) Comment: \"4 weeks ago\"  SpO2 95%   BMI 48.89 kg/m²     General appearance: No apparent distress, appears stated age and cooperative. HEENT: Pupils equal, round, and reactive to light. Conjunctivae/corneas clear. Neck: Supple, with full range of motion. No jugular venous distention. Trachea midline. Respiratory:  Normal respiratory effort. Clear to auscultation, bilaterally without Rales/Wheezes/Rhonchi. Cardiovascular: Regular rate and rhythm with normal S1/S2 without murmurs, rubs or gallops. Abdomen: Soft, tender to palpation, guarding, non-distended with normal bowel sounds. Musculoskeletal: No clubbing, cyanosis or edema bilaterally. Full range of motion without deformity. Skin: Skin color, texture, turgor normal.  No rashes or lesions. Neurologic:  Neurovascularly intact without any focal sensory/motor deficits.  Cranial nerves: II-XII intact, grossly non-focal.  Psychiatric: Alert and oriented, thought content appropriate, normal insight  Capillary Refill: Brisk,< 3 seconds   Peripheral Pulses: +2 palpable, equal bilaterally       Labs:   Recent Labs 01/24/23  1339 01/25/23  0608   WBC 14.7* 18.6*   HGB 13.7 11.8*   HCT 42.0 36.4    245     Recent Labs     01/24/23  1339 01/25/23  0608    133*   K 4.3 3.3*   CL 98* 96*   CO2 18* 22   BUN 17 8   CREATININE 0.6 0.6   CALCIUM 10.2 8.2*     Recent Labs     01/24/23  1339 01/25/23  0608   AST 27 21   ALT 20 18   BILITOT 0.3 0.6   ALKPHOS 130* 102     No results for input(s): INR in the last 72 hours. No results for input(s): [place] in the last 72 hours. Urinalysis:      Lab Results   Component Value Date/Time    NITRU Negative 01/24/2023 02:41 PM    WBCUA 0 01/24/2023 02:41 PM    BACTERIA None Seen 01/24/2023 02:41 PM    RBCUA 4 01/24/2023 02:41 PM    BLOODU SMALL 01/24/2023 02:41 PM    SPECGRAV 1.022 01/24/2023 02:41 PM    GLUCOSEU Negative 01/24/2023 02:41 PM       Radiology:  CT ABDOMEN PELVIS W IV CONTRAST Additional Contrast? None   Final Result   Diffuse hepatic steatosis. Mildly distended gallbladder with cholelithiasis. US GALLBLADDER RUQ   Final Result   Cholelithiasis without evidence of cholecystitis. Increased echogenicity of the liver compatible with diffuse hepatocellular   disease, most commonly due to hepatic steatosis. Assessment/Plan:    Active Hospital Problems    Diagnosis     Sepsis ([address] [A41.9]      Priority: Medium    Abdominal pain [R10.9]      Priority: Medium    DM2 (diabetes mellitus, type 2) ([address] [E11.9]      Priority: Medium    Hyperlipidemia LDL goal <100 [E78.5]      Sepsis of unclear etiology. POA: tachycardia, tachypnea, leukocytosis, febrile. Suspicion for GI   - CT abd/pel: Diffuse hepatic steatosis. Mildly distended gallbladder with cholelithiasis. -Right upper quadrant ultrasound with cholelithiasis without evidence of cholecystitis.   Increased echogenicity of the liver compatible with diffuse hepatocellular disease, most commonly due to hepatic steatosis  - IV zosyn continued for now pending further work-up  - GI and general surgery consulted, appreciate their input  - Received 30ml/kg bolus in ED, keep n.p.o. until consultations completed  - IV pain meds as needed  - EGD planned for today with GI    Essential HTN, controlled. - Continue home losartan  - Telemetry monitoring    DM2, uncontrolled. - Hemoglobin a1c pending  - LDSSI  - POCT Q4hrs while NPO  - Hypoglycemia protocol  - Carb control diet when able    Morbid Obesity -  With Body mass index is 48.89 kg/m². Complicating assessment and treatment. Placing patient at risk for multiple co-morbidities as well as early death and contributing to the patient's presentation. Counseled on weight loss      DVT Prophylaxis: lovenox  Diet: Diet NPO Exceptions are: Ice Chips, Sips of Water with Meds  Code Status: Full Code    PT/OT Eval Status: not ordered    Dispo - pending clinical improvement    Jayshree Ornelas - NP

## 2023-01-25 NOTE — CONSULTS
GASTROENTEROLOGY INPATIENT CONSULTATION        IDENTIFYING DATA/REASON FOR CONSULTATION   PATIENT:  Cherylynn Favre  MRN:  [id number]  ADMIT DATE: 1/24/2023  TIME OF EVALUATION: 1/25/2023 7:04 AM  HOSPITAL STAY:   LOS: 1 day     REASON FOR CONSULTATION:  abd pain    HISTORY OF PRESENT ILLNESS   Cherylynn Favre is a 41 y.o. female with a PMH of HTN, SARA, obesity who presented on 1/24/2023 with abdominal pain. We have been consulted regarding abdominal pain. Pt had a late meal the night before and woke up the next morning with abdominal pain in the epigastric region. Has experienced reflux symptoms before and attributed this pain to reflux from her late meal. Pain persisted throughout the morning and never resolved despite heating pads, antiacids and positional maneuvers. Developed N/V and pain radiating to RUQ. Denies changes to bowel or bladder. Recently on meloxicam Nov/Dec. Has been on Trulicity for years and tolerated this medication well, also on iron supplements and denies gastric irritation or dark stools. Follows with Dr. Олег Fierro, has had 2 previous EGDs. Records were not available. Pt. Takes Protonix 20mg BID. Denies fever or chills. WBC 18.6  Alk Phos 102  ALT 18  AST 21  Lipase 42    RUQ Ultrasound (1/24/23)  Cholelithiasis without evidence of cholecystitis. Increased echogenicity of the liver compatible with diffuse hepatocellular disease, most commonly due to hepatic steatosis. CT abd/pelvic (1/24/23)  Diffuse hepatic steatosis. Mildly distended gallbladder with cholelithiasis    Prior Endoscopic Evaluations:  Pt. states EGD ~2 years ago with Dr. Олег Fierro. Records were not available    Colonoscopy 2018 Dr. Олег Fierro   1. Normal Ileum  2. A 5mm polyp was identified in the sigmoid colon and removed completely with cold snare polypectomy down to a clean base confirmed by post-polypectomy photograph. All polyp tissue sent off for pathologic evaluation.   A 4mm polyp was identified in the cecum and removed completely with cold snare polypectomy down to a clean base confirmed by post-polypectomy photograph. All polyp tissue sent off for pathologic evaluation. 3.  Colon otherwise normal.  Random biopsies were obtained to evaluate for microscopic colitis. 4.  Small grade 1 internal hemorrhoids. 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    Chronic folliculitis 0/98/1062    Colon polyp 5/2/2018    Dysfunctional uterine bleeding     False positive serology for HIV 7/30/2017    H/O colonoscopy- done 4/12/2018 polyp, repeat 5 years 4/13/2018    Hypertension, essential 8/10/2022    Obstructive apnea     Seborrhea     Urticaria, idiopathic      Past Surgical History:   Procedure Laterality Date    ANKLE SURGERY Left     ENDOSCOPY, COLON, DIAGNOSTIC      MOUTH SURGERY  2016    gingival auto graft     Family History   Problem Relation Age of Onset    Alzheimer's Disease Father     Cancer Sister         peritoneal    Other Brother         SARA    Diabetes Maternal Grandmother      Social History     Socioeconomic History    Marital status:      Spouse name: None    Number of children: None    Years of education: None    Highest education level: None   Occupational History    Occupation:    Tobacco Use    Smoking status: Never    Smokeless tobacco: Never    Tobacco comments:     advised not to start   Vaping Use    Vaping Use: Never used   Substance and Sexual Activity    Alcohol use: Yes     Alcohol/week: 1.0 standard drink     Types: 1 Cans of beer per week    Drug use: No    Sexual activity: Yes     Partners: Male     Birth control/protection: Pill   Social History Narrative    Lives with spouse. Exercise:  Yoga, machines.   Seatbelt use: Always       MEDICATIONS   SCHEDULED:  sodium chloride flush, 5-40 mL, 2 times per day  enoxaparin, 30 mg, BID  piperacillin-tazobactam, 3,375 mg, Q8H  fluticasone, 2 puff, Daily  losartan, 25 mg, Daily  montelukast, 10 mg, Daily  pantoprazole, 40 mg, BID  insulin lispro, 0-4 Units, Q4H      FLUIDS/DRIPS:     sodium chloride      dextrose      sodium chloride 100 mL/hr at 01/24/23 2120     PRNs: sodium chloride flush, 5-40 mL, PRN  sodium chloride, , PRN  acetaminophen, 650 mg, Q6H PRN   Or  acetaminophen, 650 mg, Q6H PRN  albuterol sulfate HFA, 2 puff, Q4H PRN  glucose, 4 tablet, PRN  dextrose bolus, 125 mL, PRN   Or  dextrose bolus, 250 mL, PRN  glucagon (rDNA), 1 mg, PRN  dextrose, , Continuous PRN  HYDROmorphone, 0.25 mg, Q4H PRN   Or  HYDROmorphone, 0.5 mg, Q4H PRN  naloxone, 0.4 mg, PRN  hydrALAZINE, 5 mg, Q4H PRN      ALLERGIES:  She   Allergies   Allergen Reactions    Fluoxetine Other (See Comments)     Overtim, head ache    Lisinopril Other (See Comments)     cough    Oseltamivir Phosphate Other (See Comments)    Lexapro [Escitalopram Oxalate] Other (See Comments)     sleepy    Methylisothiazolinone Itching and Rash    Sulfa Antibiotics Rash       REVIEW OF SYSTEMS   Pertinent ROS noted in HPI    PHYSICAL EXAM     Vitals:    01/24/23 2200 01/24/23 2303 01/25/23 0412 01/25/23 0414   BP: (!) 182/121 (!) 146/81 132/71    Pulse: (!) 106 (!) 120 (!) 123    Resp: 23 18 16    Temp:  98.1 °F (36.7 °C) 100.4 °F (38 °C)    TempSrc:  Oral Oral    SpO2: 96% 95% 95%    Weight:    (!) 312 lb 2.7 oz (141.6 kg)   Height:    5' 7\" (1.702 m)       No intake/output data recorded. Physical Exam:  General appearance: alert, cooperative, no distress, appears stated age  Eyes: Anicteric  Head: Normocephalic, without obvious abnormality  Lungs: clear to auscultation bilaterally, Normal Effort  Heart: regular rate and rhythm, normal S1 and S2, no murmurs or rubs  Abdomen: Tender in epigastric and RUQ soft, non-distended. Bowel sounds normal. No masses,  no organomegaly.    Extremities: atraumatic, no cyanosis or edema  Skin: warm and dry, no jaundice  Neuro: Grossly intact, A&OX3      LABS AND IMAGING   Laboratory   Recent Labs     01/24/23  1339 01/25/23  0608   WBC 14.7* 18.6*   HGB 13.7 11.8*   HCT 42.0 36.4   MCV 86.3 85.5    245     Recent Labs     01/24/23  1339 01/25/23  0608    133*   K 4.3 3.3*   CL 98* 96*   CO2 18* 22   BUN 17 8   CREATININE 0.6 0.6     Recent Labs     01/24/23  1339 01/25/23  0608   AST 27 21   ALT 20 18   BILITOT 0.3 0.6   ALKPHOS 130* 102     Recent Labs     01/24/23  1339   LIPASE 42.0     No results for input(s): PROTIME, INR in the last 72 hours. Imaging  CT ABDOMEN PELVIS W IV CONTRAST Additional Contrast? None   Final Result   Diffuse hepatic steatosis. Mildly distended gallbladder with cholelithiasis. US GALLBLADDER RUQ   Final Result   Cholelithiasis without evidence of cholecystitis. Increased echogenicity of the liver compatible with diffuse hepatocellular   disease, most commonly due to hepatic steatosis. ASSESSMENT AND RECOMMENDATIONS   41 y.o. female with a PMH of HTN, SARA, obesity who presented on 1/24/2023 with abdominal pain. We have been consulted regarding abdominal pain. IMPRESSION:  Abdominal Pain- Acute onset, never had an episode like this. Differentials include gastritis, PUD for recent NSAID use, reflux esophagitis with history of hiatal hernia, acute viral gastroenteritis, symptomatic cholelithiasis although pain not consistent with biliary colic, less likely cholecystitis or pancreatitis given normal CT scan. Will plan for EGD today. Cont. IV PPI. NPO until after procedure    RECOMMENDATIONS:    EGD scheduled for today  NPO until after procedure  Cont. IV PPI  Further recommendations following EGD    If you have any questions or need any further information, please feel free to contact our consult team.  Thank you for allowing us to participate in the care of Sarah Cole.     The note was completed using Dragon voice recognition transcription. Every effort was made to ensure accuracy; however, inadvertent transcription errors may be present despite my best efforts to edit errors. Janelle Donis PA-C    Attending physician addendum:    I have personally seen and examined the patient, reviewed the patient's medical record and pertinent labs and clinical imaging. I have personally staffed the case with Ronn ROMERO. I agree with her consultation note, exam findings, assessment and plans  as written above. I have made appropriate modifications and edited her assessment and plan where needed to reflect my impression and plans for this patient. Jaden Weaver is a 40 YO female with a PMH of HTN, SARA, obesity who presented on 1/24/2023 with abdominal pain. We have been consulted regarding abdominal pain. Acute onset of abdominal pain starting yesterday AM. Hx of GERD on Pantoprazole. Remote Meloxicam use. She reports hx hiatal hernia diagnosed on remote EGD. She is on Trulicity but not new medication. No nausea/early satiety with medication. She is on Iron but reports tolerates well with no side effects. RUQ US/CT showed cholelithiasis but no GB thickening/pericholecystic fluid. No prior bilary colic symptoms. Pain started in AM not proceeded by a large meal. CT otherwise negative. LFT with elevated alkaline phosphatase likely related to fatty liver. Continue PPI. EGD today. Keep NPO. ASA:3  Mallampati: 2     Thank you for allowing me to participate in this patient's care. If there are any questions or concerns regarding this patient, or the plan we have set in place, please feel free to contact me at [phone].      Willa Degroot MD

## 2023-01-25 NOTE — PROGRESS NOTES
Pharmacy Medication Reconciliation Note     List of medications Brian Rios is currently taking is complete. Source of information:   1. Conversation with patient at bedside  2. EMR    Notes regarding home medications:   1.  Patient reports taking all AM meds PTA in the ED     Patient denies taking any additional OTC or herbal medications    Karel Castillo, Pharmacy Intern  1/24/2023  8:30 PM

## 2023-01-25 NOTE — ANESTHESIA POSTPROCEDURE EVALUATION
Department of Anesthesiology  Postprocedure Note    Patient: Jair Arredondo  MRN: [id number]  YOB: 1975  Date of evaluation: 1/25/2023      Procedure Summary     Date: 01/25/23 Room / Location: [address] [place] [address]    Anesthesia Start: 4464 Anesthesia Stop: 3327    Procedure: ESOPHAGOGASTRODUODENOSCOPY Diagnosis:       Abdominal pain, unspecified abdominal location      (Abdominal pain, unspecified abdominal location [R10.9])    Surgeons: Milan King MD Responsible Provider: Dawit Granger MD    Anesthesia Type: MAC ASA Status: 3          Anesthesia Type: No value filed. Mark Phase I: Mark Score: 9    Mark Phase II:        Anesthesia Post Evaluation    Patient location during evaluation: PACU  Patient participation: complete - patient participated  Level of consciousness: awake  Airway patency: patent  Nausea & Vomiting: no nausea and no vomiting  Cardiovascular status: blood pressure returned to baseline  Respiratory status: acceptable  Hydration status: stable  Comments: Vital signs stable  OK to discharge from Stage I post anesthesia care.   Care transferred from Anesthesiology department on discharge from perioperative area   Multimodal analgesia pain management approach

## 2023-01-25 NOTE — PROGRESS NOTES
Pt prepped for procedure. A&Ox4. Consent signed and placed in soft chart. NPO since midnight. Patent IV to R hand, IVF infusing at 100ml/hr. All belongings removed. Preop checklist complete. Transferred off unit on stretcher.  Electronically signed by Pat Jaquez RN on 1/25/2023 at 1:28 PM

## 2023-01-25 NOTE — OP NOTE
Endoscopy Note    Patient: Jaden Weaver  : 1975  Acct#:     Procedure: Esophagogastroduodenoscopy                         Date:  2023     Surgeon:   Willa Degroot MD    Referring Physician:  Ray Wolfe MD    Indications: This is a 41y.o. year old female who presents today with Abdominal Pain    Postoperative Diagnosis:  1. Large amount of food retained in the stomach suggestive of Gastroparesis. Anesthesia:  The patient was administered IV propofol per anesthesiology team.  Please see their operative records for full details. Consent:  The patient or their legal guardian has signed an informed consent, and is aware of the potential risks, benefits, alternatives, and potential complications of this procedure. These include, but are not limited to hemorrhage, bleeding, post procedural pain, perforation, phlebitis, aspiration, hypotension, hypoxia, cardiovascular events such as arryhthmia, and possibly death. Description of Procedure: The patient was then taken to the endoscopy suite, placed in the left lateral decubitus position and the above IV sedation was administrered. The Olympus video endoscope was placed through the patient's oropharynx without difficulty to the extent of the 2nd portion of the duodenum. Both forward and retroflexed views of the stomach were obtained. Findings:    Esophagus: The esophagus appeared normal without evidence of Strickland's esophagus or reflux esophagitis. Stomach: The stomach showed a large amount of food retained in the stomach suggestive of Gastroparesis. The stomach otherwise appeared normal on forward and retroflexed views based on limited examination.      Duodenum: The first and 2nd portions of the duodenum appeared normal with normal villous pattern    Estimated Blood Loss (mL): None    Complications: None    Specimens: None    The scope was then withdrawn back into the stomach, it was decompressed, and the scope was completely withdrawn.    The patient tolerated the procedure well and was taken to the post anesthesia care unit in good condition.    Impression:   1) See post procedure diagnoses    Recommendations:   1) Clear liquid diet. Advance diet as tolerated  2) Concern the food retained in the stomach related to patient's Trulicity. I am going to check a HIDA scan. If HIDA unremarkable may need to consider trial of Reglan/DC Trulicity if gastroparesis is contributing to symptoms. Recommend continuing on Pantoprazole. Discussed with .     Aneudy Garcia MD  [facility]  [phone]

## 2023-01-25 NOTE — ANESTHESIA PRE PROCEDURE
Department of Anesthesiology  Preprocedure Note       Name:  Edwardo Nash   Age:  41 y.o.  :  1975                                          MRN:  [id number]         Date:  2023      Surgeon: Basil Fernández):  Debbie Mckeon MD    Procedure: Procedure(s):  ESOPHAGOGASTRODUODENOSCOPY    Medications prior to admission:   Prior to Admission medications    Medication Sig Start Date End Date Taking? Authorizing Provider   ferrous sulfate (IRON 325) 325 (65 Fe) MG tablet Take 325 mg by mouth in the morning and at bedtime   Yes Historical Provider, MD   losartan (COZAAR) 50 MG tablet TAKE 1 TABLET EVERY        MORNING(NEW DOSAGE) 23   Demetra Caro MD   Accu-Chek Softclix Lancets MISC Test twice daily 22   OC Linares CNP   blood glucose test strips (ASCENSIA AUTODISC VI;ONE TOUCH ULTRA TEST VI) strip 1 each by In Vitro route 2 times daily As needed. 22   OC Linares CNP   blood glucose monitor kit and supplies Dispense sufficient amount for indicated testing frequency plus additional to accommodate PRN testing needs. Dispense all needed supplies to include: monitor, strips, lancing device, lancets, control solutions, alcohol swabs.  22   OC Linares CNP   dulaglutide (TRULICITY) 1.5 GR/4.1HO SC injection ADMINISTER 1.5 MG UNDER THE SKIN 1 TIME A WEEK 22   OC Linares CNP   sertraline (ZOLOFT) 50 MG tablet Take one tablet daily 22   OC Linares CNP   montelukast (SINGULAIR) 10 MG tablet Take one tablet daily 22   OC Linares CNP   metFORMIN (GLUCOPHAGE-XR) 500 MG extended release tablet Take 4 tablets daily with breakfast 22   OC Linares CNP   pantoprazole (PROTONIX) 40 MG tablet TAKE 1 TABLET BY MOUTH TWICE DAILY 22   OC Linares CNP   FLOVENT DISKUS 100 MCG/BLIST AEPB inhaler INHALE 2 PUFFS INTO THE LUNGS DAILY  Patient taking differently: Inhale 1 puff into the lungs daily 4/22/22   OC Fajardo - CNP   norethindrone-ethinyl estradiol ([address] 7/7/7, 28,) 0.5/0.75/1-35 MG-MCG per tablet Take 1 tablet by mouth daily 4/6/22   Gwen Chaney MD   loratadine (CLARITIN) 10 MG tablet Take 10 mg by mouth daily.       Historical Provider, MD       Current medications:    Current Facility-Administered Medications   Medication Dose Route Frequency Provider Last Rate Last Admin    magnesium sulfate 1000 mg in dextrose 5% 100 mL IVPB  1,000 mg IntraVENous Once OC Khan -  mL/hr at 01/25/23 1322 1,000 mg at 01/25/23 1322    potassium chloride (KLOR-CON M) extended release tablet 40 mEq  40 mEq Oral Once OC Khan - NP        sodium chloride flush 0.9 % injection 5-40 mL  5-40 mL IntraVENous 2 times per day Tristin PARKER Kearns, DO   10 mL at 01/24/23 2319    sodium chloride flush 0.9 % injection 5-40 mL  5-40 mL IntraVENous PRN Tristin M Urmila, DO        0.9 % sodium chloride infusion   IntraVENous PRN Tristin PARKER Kearns, DO        enoxaparin Sodium (LOVENOX) injection 30 mg  30 mg SubCUTAneous BID Tristin PARKER Kearns, DO        acetaminophen (TYLENOL) tablet 650 mg  650 mg Oral Q6H PRN Tristin  Urmila, DO   650 mg at 01/25/23 0117    Or    acetaminophen (TYLENOL) suppository 650 mg  650 mg Rectal Q6H PRN Tristin M Urmila, DO        piperacillin-tazobactam (ZOSYN) 3,375 mg in dextrose 5 % 50 mL IVPB (mini-bag)  3,375 mg IntraVENous Q8H Tristin  Urmila, DO 12.5 mL/hr at 01/25/23 1321 3,375 mg at 01/25/23 1321    albuterol sulfate HFA (PROVENTIL;VENTOLIN;PROAIR) 108 (90 Base) MCG/ACT inhaler 2 puff  2 puff Inhalation Q4H PRN Tristin PARKER Kearns, DO        fluticasone (FLOVENT HFA) 110 MCG/ACT inhaler 2 puff  2 puff Inhalation Daily Tristinjeremy Kearns,         losartan (COZAAR) tablet 25 mg  25 mg Oral Daily Tristin Kearns DO        montelukast (SINGULAIR) tablet 10 mg  10 mg Oral Daily Tristin PARKER Kearns,         pantoprazole (PROTONIX) tablet 40 mg  40 mg Oral BID Tristin  Wadhwani, DO        insulin lispro (HUMALOG) injection vial 0-4 Units  0-4 Units SubCUTAneous Q4H [facility]        glucose chewable tablet 16 g  4 tablet Oral PRN [facility]        dextrose bolus 10% 125 mL  125 mL IntraVENous PRN [facility]        Or    dextrose bolus 10% 250 mL  250 mL IntraVENous PRN [facility]        glucagon (rDNA) injection 1 mg  1 mg SubCUTAneous PRN [facility]        dextrose 10 % infusion   IntraVENous Continuous PRN [facility]        0.9 % sodium chloride infusion   IntraVENous Continuous [facility],  mL/hr at 01/25/23 0914 New Bag at 01/25/23 0914    HYDROmorphone (DILAUDID) injection 0.25 mg  0.25 mg IntraVENous Q4H PRN [facility]        Or    HYDROmorphone (DILAUDID) injection 0.5 mg  0.5 mg IntraVENous Q4H PRN [facility]   0.5 mg at 01/25/23 0911    naloxone [facility]) injection 0.4 mg  0.4 mg IntraVENous PRN [facility]        hydrALAZINE (APRESOLINE) injection 5 mg  5 mg IntraVENous Q4H PRN Tristin Chevis Nettle, DO           Allergies:     Allergies   Allergen Reactions    Fluoxetine Other (See Comments)     Overtim, head ache    Lisinopril Other (See Comments)     cough    Oseltamivir Phosphate Other (See Comments)    Lexapro [Escitalopram Oxalate] Other (See Comments)     sleepy    Methylisothiazolinone Itching and Rash    Sulfa Antibiotics Rash       Problem List:    Patient Active Problem List   Diagnosis Code    Seborrhea L21.9    Dysfunctional uterine bleeding N93.8    Candidal intertrigo B37.2    Urticaria, idiopathic L50.1    Low back pain M54.50    Allergic rhinitis J30.9    Asthma- mild persistent J45.909    GERD (gastroesophageal reflux disease) K21.9    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    Hiatal hernia K44.9    Obesity, Class III, BMI 40-49.9 (morbid obesity) ([facility]) E66.01    Obstructive apnea- CPAP G47.33    Hyperlipidemia LDL goal <100 E78.5    Type 2 diabetes mellitus with diabetic nephropathy (HCC) E11.21    Irritable bowel syndrome with diarrhea K58.0    History of colon polyps Z86.010    Anxiety F41.9    Microcytic anemia D50.9    RLS (restless legs syndrome) G25.81    Hypertension, essential I10    Sepsis ([facility] A41.9    Abdominal pain R10.9    DM2 (diabetes mellitus, type 2) ([facility] [address] E11.9       Past Medical History:        Diagnosis Date    Abnormal Pap smear- LGSIL 2001 5/4/2011 2001, nl since    Cholinergic urticaria 77/7/4517    Chronic folliculitis 8/39/1410    Colon polyp 5/2/2018    Dysfunctional uterine bleeding     False positive serology for HIV 7/30/2017    H/O colonoscopy- done 4/12/2018 polyp, repeat 5 years 4/13/2018    Hypertension, essential 8/10/2022    Obstructive apnea     Seborrhea     Urticaria, idiopathic        Past Surgical History:        Procedure Laterality Date    ANKLE SURGERY Left     ENDOSCOPY, COLON, DIAGNOSTIC      MOUTH SURGERY  2016    gingival auto graft       Social History:    Social History     Tobacco Use    Smoking status: Never    Smokeless tobacco: Never    Tobacco comments:     advised not to start   Substance Use Topics    Alcohol use:  Yes     Alcohol/week: 1.0 standard drink     Types: 1 Cans of beer per week                                Counseling given: Not Answered  Tobacco comments: advised not to start      Vital Signs (Current):   Vitals:    01/25/23 0757 01/25/23 0802 01/25/23 1200 01/25/23 1336   BP: 123/75  (!) 140/82 116/69   Pulse: (!) 110  (!) 114 (!) 110   Resp: 18 18 16 18   Temp: 98.2 °F (36.8 °C)  98.2 °F (36.8 °C) 99.1 °F (37.3 °C)   TempSrc: Oral  Oral Temporal   SpO2: 95% 95% 95% 97%   Weight:       Height:                                                  BP Readings from Last 3 Encounters:   01/25/23 116/69   11/30/22 124/86   08/10/22 (!) 140/88       NPO Status: Time of last liquid consumption: 0100 Time of last solid consumption: 1000                        Date of last liquid consumption: 01/24/23                        Date of last solid food consumption: 01/24/23    BMI:   Wt Readings from Last 3 Encounters:   01/25/23 (!) 312 lb 2.7 oz (141.6 kg)   11/30/22 (!) 313 lb (142 kg)   08/10/22 (!) 310 lb (140.6 kg)     Body mass index is 48.89 kg/m².     CBC:   Lab Results   Component Value Date/Time    WBC 18.6 01/25/2023 06:08 AM    RBC 4.26 01/25/2023 06:08 AM    HGB 11.8 01/25/2023 06:08 AM    HCT 36.4 01/25/2023 06:08 AM    MCV 85.5 01/25/2023 06:08 AM    RDW 16.0 01/25/2023 06:08 AM     01/25/2023 06:08 AM       CMP:   Lab Results   Component Value Date/Time     01/25/2023 06:08 AM    K 3.3 01/25/2023 06:08 AM    K 4.3 01/24/2023 01:39 PM    CL 96 01/25/2023 06:08 AM    CO2 22 01/25/2023 06:08 AM    BUN 8 01/25/2023 06:08 AM    CREATININE 0.6 01/25/2023 06:08 AM    GFRAA >60 09/23/2022 10:47 AM    AGRATIO 1.0 01/25/2023 06:08 AM    LABGLOM >60 01/25/2023 06:08 AM    GLUCOSE 155 01/25/2023 06:08 AM    PROT 6.6 01/25/2023 06:08 AM    CALCIUM 8.2 01/25/2023 06:08 AM    BILITOT 0.6 01/25/2023 06:08 AM    ALKPHOS 102 01/25/2023 06:08 AM    AST 21 01/25/2023 06:08 AM    ALT 18 01/25/2023 06:08 AM       POC Tests:   Recent Labs     01/25/23  1203   POCGLU 175*       Coags: No results found for: PROTIME, INR, APTT    HCG (If Applicable):   Lab Results   Component Value Date    PREGTESTUR Negative 04/12/2018        ABGs: No results found for: PHART, PO2ART, HSV7KMK, NAN8EFL, BEART, C6UFWQYE     Type & Screen (If Applicable):  No results found for: LABABO, LABRH    Drug/Infectious Status (If Applicable):  No results found for: HIV, HEPCAB    COVID-19 Screening (If Applicable):   Lab Results   Component Value Date/Time    COVID19 Not Detected 07/03/2020 10:53 AM           Anesthesia Evaluation  Patient summary reviewed no history of anesthetic complications:   Airway: Mallampati: II  TM distance: >3 FB   Neck ROM: full  Mouth opening: > = 3 FB   Dental: normal exam         Pulmonary:normal exam    (+) sleep apnea:  asthma:                            Cardiovascular:  Exercise tolerance: good (>4 METS),   (+) hypertension:,     (-) past MI, CAD and  ALMANZA      Rhythm: regular  Rate: normal                    Neuro/Psych:   Negative Neuro/Psych ROS              GI/Hepatic/Renal:   (+) hiatal hernia, GERD:, morbid obesity     (-) liver disease, no renal disease and bowel prep       Endo/Other:    (+) DiabetesType II DM, , blood dyscrasia: anemia:., electrolyte abnormalities (hypokalemia), . Abdominal:   (+) obese,           Vascular: negative vascular ROS. Other Findings:           Anesthesia Plan      MAC     ASA 3     (41 yo F with morbid obesity, DM2, HTN, SARA, hiatal hernia, GERD presenting for EGD for abdominal pain and nausea. Discussed risks and benefits to sedation including nausea, vomiting, allergic reaction, headache, delayed cognitive recovery, stroke, heart attack, respiratory depression, and death which patient understood and agreed to proceed. The patient was given the opportunity to ask questions and all questions were answered to the patient's satisfaction.  )  Induction: intravenous. MIPS: Prophylactic antiemetics administered. Anesthetic plan and risks discussed with patient. Plan discussed with CRNA. This pre-anesthesia assessment may be used as a history and physical.    DOS STAFF ADDENDUM:    Pt seen and examined, chart reviewed (including anesthesia, drug and allergy history). No interval changes to history and physical examination. Anesthetic plan, risks, benefits, alternatives, and personnel involved discussed with patient. Patient verbalized an understanding and agrees to proceed.       Jacinta Buck MD  January 25, 2023  1:51 PM

## 2023-01-25 NOTE — CARE COORDINATION
Case Management Assessment  Initial Evaluation    Date/Time of Evaluation: 1/25/2023 10:47 AM  Assessment Completed by: Andres Mccullough RN    If patient is discharged prior to next notation, then this note serves as note for discharge by case management. Patient Name: Diego Brooks                   YOB: 1975  Diagnosis: Abdominal pain, epigastric [R10.13]  Septicemia ([facility] [address] [A41.9]  Calculus of gallbladder without cholecystitis without obstruction [K80.20]  Sepsis ([facility] [address] [A41.9]                   Date / Time: 1/24/2023  1:22 PM    Patient Admission Status: Inpatient   Readmission Risk (Low < 19, Mod (19-27), High > 27): Readmission Risk Score: 7.9    Current PCP: Zaida Shultz MD  PCP verified by CM? Yes    Chart Reviewed: Yes      History Provided by: Patient  Patient Orientation: Alert and Oriented, Person, Place, Situation, Self    Patient Cognition: Alert    Hospitalization in the last 30 days (Readmission):  No    If yes, Readmission Assessment in  Navigator will be completed. Advance Directives:      Code Status: Full Code   Patient's Primary Decision Maker is: Legal Next of Kin    Primary Decision Maker: Gabo Arias Spouse - [phone]    Discharge Planning:    Patient lives with: Spouse/Significant Other Type of Home: House  Primary Care Giver: Self  Patient Support Systems include: Spouse/Significant Other   Current Financial resources:    Current community resources:    Current services prior to admission: C-pap            Current DME:              Type of Home Care services:  None    ADLS  Prior functional level: Independent in ADLs/IADLs  Current functional level: Independent in ADLs/IADLs    PT AM-PAC:   /24  OT AM-PAC:   /24    Family can provide assistance at DC: Yes  Would you like Case Management to discuss the discharge plan with any other family members/significant others, and if so, who?  Yes (spouse)  Plans to Return to Present Housing: Yes  Other Identified Issues/Barriers to RETURNING to current housing: na  Potential Assistance needed at discharge: N/A            Potential DME:    Patient expects to discharge to: [facility] for transportation at discharge: Family    Financial    Payor: UMR / Plan: UMR  / Product Type: *No Product type* /     Does insurance require precert for SNF: na    Potential assistance Purchasing Medications: No  Meds-to-Beds request: Yes      [id number]- [facility] - [place], [address]  [place] [address]  Phone: [phone] Fax: [phone]    [facility] [address] [facility], [address] [place], [place] [address] [phone] Nan Earnest [address] [address]  Phone: [phone] Fax: [phone] - [facility] [place], [address] CHEN [address] [facility] [place]. Sharron Shelby [phone] - f [address]   [facility] [address]  Phone: [phone] Fax: [phone]    [facility] [address] [facility] [address], [address] [facility] [phone] - f [phone]  . [facility] [address] [address]  Phone: [phone] Fax: [phone]      Notes:    Factors facilitating achievement of predicted outcomes: Family support, Motivated, Cooperative, Pleasant, and Has needed Durable Medical Equipment at home    Barriers to discharge: na    Additional Case Management Notes: denies needs, plans to dc home with spouse, he can drive    The Plan for Transition of Care is related to the following treatment goals of Abdominal pain, epigastric [R10.13]  Septicemia ([facility] [address] [A41.9]  Calculus of gallbladder without cholecystitis without obstruction [K80.20]  Sepsis ([address] [A41.9]    Mohini Hamilton RN, BSN,   [phone]  Electronically signed by Mohini Hamilton RN on 1/25/2023 at 10:47 AM

## 2023-01-25 NOTE — PROGRESS NOTES
Physician Progress Note      Manuel Villela  CSN #:                  [id number]  :                       1975  ADMIT DATE:       2023 1:22 PM  [address] [place] DATE:  RESPONDING  PROVIDER #:        Eliza Young NP          QUERY TEXT:    Patient admitted with BMI 48.89 . If possible, please document in progress   notes and discharge summary if you are evaluating and /or treating any of the   following: The medical record reflects the following:  Risk Factors: dm  Clinical Indicators: Documentation per nursing of Ht-5'7, Wt-312lbs and   calculated BMI 48.89  Treatment: npo, daily wts      ? Specificity of obesity and morbid obesity should be reported based on   physician documentation, as there are several published classifications and   definitions?  MS-DRG Training Guide. CDC:   https://cook-fitzgerald.info/. WHO:   http://prateek.biz/. NIH:   LargeFood.be  Options provided:  -- Obesity  -- Morbid obesity  -- Severe obesity  -- Overweight  -- BMI not clinically significant  -- Other - I will add my own diagnosis  -- Disagree - Not applicable / Not valid  -- Disagree - Clinically unable to determine / Unknown  -- Refer to Clinical Documentation Reviewer    PROVIDER RESPONSE TEXT:    This patient has morbid obesity. Query created by:  Yajaira Bustillos on 2023 8:53 AM      Electronically signed by:  Eliza Young NP 2023 11:37 AM

## 2023-01-25 NOTE — CONSULTS
Surgery Consult Note     Martine Galvez PA-C  Pt Name: Fredrick Nichols  MRN: [id number]  YOB: 1975  Date of evaluation: 1/25/2023  Primary Care Physician: Tiny Hawthorne MD  Referring Physician. Tristin Kearns  Reason for Consultation: abdominal pain, sepsis, unclear etiology- gallstones vs gastritis  Chief Complaint: abdominal pain  IMPRESSIONS:   Abdominal pain. Hx of sliding hiatal hernia per patient. Pain/Reflux after meals only when laying down afterwards. No postprandial pain when sitting up. Leukocytosis: WBC count 14.7 --> 18.6  LFT's OK  BMI: 48.89  PLANS:   EGD today  Will continue to monitor and further eval gallbladder as potential etiology of her pain after the EGD findings are known  NPO for EGD  Monitor and control pain  OOB as tolerated  SUBJECTIVE:   History of Chief Complaint:    Fredrick Nichols is a 41 y.o. female who presents with abdominal pain. She stated the the abdominal pain began yesterday AM in the epigastric area. The pain continued and at 10:00 AM she had associated nausea and emesis. She stated she has a history of a hiatal hernia and gets reflux pain at night when laying flat. She has had an EGD in the past and denies having been found to have any ulcerations or known findings. She stated that the pain is relieved when she sits up and is aggravated after eating late and the lying down. She denies having any pain after eating when sitting up. She had a CT scan performed yesterday and that showed mildly distended gallbladder with cholelithiasis. She denies having any other pain. Denies any CP, SOB ,cough, lightheadedness, or dizziness.    Past Medical History  Reviewed  has a past medical history of Abnormal Pap smear- LGSIL 2001, Cholinergic urticaria, Chronic folliculitis, Colon polyp, Dysfunctional uterine bleeding, False positive serology for HIV, H/O colonoscopy- done 4/12/2018 polyp, repeat 5 years, Hypertension, essential, Obstructive apnea, Seborrhea, and Urticaria, idiopathic. Past Surgical History  Reviewed has a past surgical history that includes Mouth surgery (2016); Ankle surgery (Left); and Endoscopy, colon, diagnostic. Medications  Prior to Admission medications    Medication Sig Start Date End Date Taking? Authorizing Provider   ferrous sulfate (IRON 325) 325 (65 Fe) MG tablet Take 325 mg by mouth in the morning and at bedtime   Yes Historical Provider, MD   losartan (COZAAR) 50 MG tablet TAKE 1 TABLET EVERY        MORNING(NEW DOSAGE) 1/23/23   Mami Pavon MD   Accu-Chek Softclix Lancets MISC Test twice daily 12/6/22   OC Eddy CNP   blood glucose test strips (ASCENSIA AUTODISC VI;ONE TOUCH ULTRA TEST VI) strip 1 each by In Vitro route 2 times daily As needed. 12/6/22   OC Eddy CNP   blood glucose monitor kit and supplies Dispense sufficient amount for indicated testing frequency plus additional to accommodate PRN testing needs. Dispense all needed supplies to include: monitor, strips, lancing device, lancets, control solutions, alcohol swabs.  12/6/22   OC Eddy CNP   dulaglutide (TRULICITY) 1.5 NP/3.6OX SC injection ADMINISTER 1.5 MG UNDER THE SKIN 1 TIME A WEEK 11/30/22   OC Eddy CNP   sertraline (ZOLOFT) 50 MG tablet Take one tablet daily 7/22/22   OC Eddy CNP   montelukast (SINGULAIR) 10 MG tablet Take one tablet daily 7/22/22   OC Eddy CNP   metFORMIN (GLUCOPHAGE-XR) 500 MG extended release tablet Take 4 tablets daily with breakfast 7/22/22   OC Eddy CNP   pantoprazole (PROTONIX) 40 MG tablet TAKE 1 TABLET BY MOUTH TWICE DAILY 7/22/22   OC Eddy CNP   FLOVENT DISKUS 100 MCG/BLIST AEPB inhaler INHALE 2 PUFFS INTO THE LUNGS DAILY  Patient taking differently: Inhale 1 puff into the lungs daily 4/22/22   OC Eddy CNP   norethindrone-ethinyl estradiol ([address] 7/7/7, 28,) 0.5/0.75/1-35 MG-MCG per tablet Take 1 tablet by mouth daily 4/6/22   Kong Adair MD   loratadine (CLARITIN) 10 MG tablet Take 10 mg by mouth daily. Historical Provider, MD    Scheduled Meds:   magnesium sulfate  1,000 mg IntraVENous Once    potassium chloride  40 mEq Oral Once    sodium chloride flush  5-40 mL IntraVENous 2 times per day    enoxaparin  30 mg SubCUTAneous BID    piperacillin-tazobactam  3,375 mg IntraVENous Q8H    fluticasone  2 puff Inhalation Daily    losartan  25 mg Oral Daily    montelukast  10 mg Oral Daily    pantoprazole  40 mg Oral BID    insulin lispro  0-4 Units SubCUTAneous Q4H     Continuous Infusions:   sodium chloride      dextrose      sodium chloride 100 mL/hr at 01/25/23 0914     PRN Meds:.sodium chloride flush, sodium chloride, acetaminophen **OR** acetaminophen, albuterol sulfate HFA, glucose, dextrose bolus **OR** dextrose bolus, glucagon (rDNA), dextrose, HYDROmorphone **OR** HYDROmorphone, naloxone, hydrALAZINE  Allergies  is allergic to fluoxetine, lisinopril, oseltamivir phosphate, lexapro [escitalopram oxalate], methylisothiazolinone, and sulfa antibiotics. Family History  Reviewed. Noncontributory to current situation  Social History   reports that she has never smoked. She has never used smokeless tobacco. She reports current alcohol use of about 1.0 standard drink per week. She reports that she does not use drugs. EDUCATION  Patient educated about their illness/diagnosis, stated above, and all questions answered. We discussed the importance of nutrition, medications they are taking, and healthy lifestyle.   Review of Systems:  General Denies any fever or chills  HEENT Denies any diplopia, tinnitus or vertigo  Resp Denies any shortness of breath, cough or wheezing  Cardiac Denies any chest pain, palpitations, claudication or edema  GI Denies any melena, hematochezia, hematemesis or pyrosis   Denies any frequency, urgency, hesitancy or incontinence  Heme Denies bruising or bleeding easily  Neuro Denies any focal motor or sensory deficits  All other systems negative  OBJECTIVE:   VITALS:  height is 5' 7\" (1.702 m) and weight is 312 lb 2.7 oz (141.6 kg) (abnormal). Her oral temperature is 98.2 °F (36.8 °C). Her blood pressure is 140/82 (abnormal) and her pulse is 114 (abnormal). Her respiration is 16 and oxygen saturation is 95%. CONSTITUTIONAL: Alert and oriented times 3, no acute distress and cooperative to examination with proper mood and affect. SKIN: Skin color, texture, turgor normal. No rashes or lesions. LYMPH: no cervical nodes, no inguinal nodes  HEENT: Head is normocephalic, atraumatic. EOMI, PERRLA. NECK: Supple, symmetrical, trachea midline, no adenopathy, thyroid symmetric, not enlarged and no tenderness, skin normal.  CHEST/LUNGS: chest symmetric with normal A/P diameter, normal respiratory rate and rhythm  CARDIOVASCULAR: Tachycardia  ABDOMEN: Normal shape. Tenderness:  epigastric and RUQ  RECTAL: deferred, not clinically indicated  NEUROLOGIC: There are no focalizing motor or sensory deficits. CN II-XII are grossly intact. Valri Dykes EXTREMITIES: no cyanosis, no clubbing, and no edema.   LABS:     Recent Labs     01/24/23  1339 01/24/23  1441 01/25/23  0608   WBC 14.7*  --  18.6*   HGB 13.7  --  11.8*   HCT 42.0  --  36.4     --  245     --  133*   K 4.3  --  3.3*   CL 98*  --  96*   CO2 18*  --  22   BUN 17  --  8   CREATININE 0.6  --  0.6   MG  --   --  1.60*   CALCIUM 10.2  --  8.2*   AST 27  --  21   ALT 20  --  18   BILITOT 0.3  --  0.6   NITRU  --  Negative  --    COLORU  --  Yellow  --    BACTERIA  --  None Seen  --      Recent Labs     01/24/23  1339 01/25/23  0608   ALKPHOS 130* 102   ALT 20 18   AST 27 21   BILITOT 0.3 0.6   LABALBU 3.9 3.3*   LIPASE 42.0  --      CBC:   Lab Results   Component Value Date/Time    WBC 18.6 01/25/2023 06:08 AM    RBC 4.26 01/25/2023 06:08 AM    HGB 11.8 01/25/2023 06:08 AM    HCT 36.4 01/25/2023 06:08 AM    MCV 85.5 01/25/2023 06:08 AM    MCH 27.8 01/25/2023 06:08 AM    MCHC 32.5 01/25/2023 06:08 AM    RDW 16.0 01/25/2023 06:08 AM     01/25/2023 06:08 AM    MPV 9.1 01/25/2023 06:08 AM     CMP:    Lab Results   Component Value Date/Time     01/25/2023 06:08 AM    K 3.3 01/25/2023 06:08 AM    K 4.3 01/24/2023 01:39 PM    CL 96 01/25/2023 06:08 AM    CO2 22 01/25/2023 06:08 AM    BUN 8 01/25/2023 06:08 AM    CREATININE 0.6 01/25/2023 06:08 AM    GFRAA >60 09/23/2022 10:47 AM    AGRATIO 1.0 01/25/2023 06:08 AM    LABGLOM >60 01/25/2023 06:08 AM    GLUCOSE 155 01/25/2023 06:08 AM    PROT 6.6 01/25/2023 06:08 AM    LABALBU 3.3 01/25/2023 06:08 AM    CALCIUM 8.2 01/25/2023 06:08 AM    BILITOT 0.6 01/25/2023 06:08 AM    ALKPHOS 102 01/25/2023 06:08 AM    AST 21 01/25/2023 06:08 AM    ALT 18 01/25/2023 06:08 AM     Urine Culture:  No components found for: CURINE  Blood Culture:  No components found for: CBLOOD, CFUNGUSBL  RADIOLOGY:     CT scan abd/pelvis (1/24/23): Diffuse hepatic steatosis. Mildly distended gallbladder with cholelithiasis. Thank you for the interesting evaluation. Further recommendations to follow. Latrice Rafi [facility]  General and Vascular Surgery [phone]  Electronically signed by Claudetta Spring, PA-C on 1/25/2023 at 12:03 PM      Attending      As per note above by Moraima Mcgrath  Patient was personally seen and examined by me today  Chart, labs and imaging reviewed    A/P  Abdominal pain   EGD today with concern for gastroparesis   Other imaging with question of gallbladder pathology   Agree with HIDA   If positive will consider cholecystectomy    Electronically signed by Yasmani Pantoja MD on 1/25/2023 at 9:15 PM

## 2023-01-25 NOTE — PLAN OF CARE
Problem: Discharge Planning  Goal: Discharge to home or other facility with appropriate resources  1/25/2023 1150 by Victorino Khan RN  Outcome: Progressing  Flowsheets (Taken 1/25/2023 1150)  Discharge to home or other facility with appropriate resources: Identify barriers to discharge with patient and caregiver     Problem: Pain  Goal: Verbalizes/displays adequate comfort level or baseline comfort level  1/25/2023 1150 by Victorino Khan RN  Outcome: Progressing  Flowsheets (Taken 1/25/2023 1150)  Verbalizes/displays adequate comfort level or baseline comfort level: Encourage patient to monitor pain and request assistance     Problem: ABCDS Injury Assessment  Goal: Absence of physical injury  1/25/2023 1150 by Victorino Khan RN  Outcome: Progressing  Flowsheets (Taken 1/25/2023 1150)  Absence of Physical Injury: Implement safety measures based on patient assessment     Problem: Gastrointestinal - Adult  Goal: Minimal or absence of nausea and vomiting  1/25/2023 1150 by Victorino Khan RN  Outcome: Progressing  Flowsheets (Taken 1/25/2023 1150)  Minimal or absence of nausea and vomiting: Administer IV fluids as ordered to ensure adequate hydration     Problem: Metabolic/Fluid and Electrolytes - Adult  Goal: Electrolytes maintained within normal limits  1/25/2023 1150 by Victorino Khan RN  Outcome: Progressing  Flowsheets (Taken 1/25/2023 1150)  Electrolytes maintained within normal limits: Monitor labs and assess patient for signs and symptoms of electrolyte imbalances     Problem: Cardiovascular - Adult  Goal: Maintains optimal cardiac output and hemodynamic stability  1/25/2023 1150 by Victorino Khan RN  Outcome: Progressing  Flowsheets (Taken 1/25/2023 1150)  Maintains optimal cardiac output and hemodynamic stability: Monitor blood pressure and heart rate     Problem: Chronic Conditions and Co-morbidities  Goal: Patient's chronic conditions and co-morbidity symptoms are monitored and maintained or improved  Flowsheets (Taken 1/25/2023 1150)  Care Plan - Patient's Chronic Conditions and Co-Morbidity Symptoms are Monitored and Maintained or Improved: Monitor and assess patient's chronic conditions and comorbid symptoms for stability, deterioration, or improvement

## 2023-01-25 NOTE — ED NOTES
Report called to [facility] on 4N. All questions answered.       Christal Griffith RN  01/24/23 9158

## 2023-01-25 NOTE — H&P
Hospital Medicine History & Physical      PCP: Rayna Daley MD    Date of Admission: 1/24/2023    Date of Service: Pt seen/examined on 1/24/2023 and admitted to inpatient    Chief Complaint: Epigastric abdominal pain, nausea and vomiting      History Of Present Illness: The patient is a 41 y.o. female with past medical history as below including diabetes and high blood pressure and high cholesterol who presents to [facility] with acute onset and waxing and waning epigastric abdominal pain that is sharp in nature and has been occurring since this past morning. She had not had pain like this before or more recently. She notes that it was associated with decreased appetite throughout the rest of the day and increasing intermittent episodes of nausea and vomiting. At 1 point she did feel as though the abdominal pain was associate with her having vomiting and then feeling lightheaded to the point of passing out but she did not actually pass out. She is uncertain as to what may be causing this abdominal pain but she does not feel as though she ate anything out of the ordinary to trigger the symptoms and no one in her family has had similar symptoms. She denies any other recent symptoms prior to today's abdominal pain nausea vomiting of fever, chills, dizziness, syncope, dysuria, blood in urine/stool/sputum/vomitus, rashes, diarrhea, chest pain, shortness of breath.     Past Medical History:        Diagnosis Date    Abnormal Pap smear- LGSIL 2001 5/4/2011 2001, nl since    Cholinergic urticaria 32/8/4959    Chronic folliculitis 5/99/4229    Colon polyp 5/2/2018    Dysfunctional uterine bleeding     False positive serology for HIV 7/30/2017    H/O colonoscopy- done 4/12/2018 polyp, repeat 5 years 4/13/2018    Hypertension, essential 8/10/2022    Obstructive apnea Seborrhea     Urticaria, idiopathic        Past Surgical History:        Procedure Laterality Date    ANKLE SURGERY Left     ENDOSCOPY, COLON, DIAGNOSTIC      MOUTH SURGERY  2016    gingival auto graft       Medications Prior to Admission:    Prior to Admission medications    Medication Sig Start Date End Date Taking? Authorizing Provider   ferrous sulfate (IRON 325) 325 (65 Fe) MG tablet Take 325 mg by mouth in the morning and at bedtime   Yes Historical Provider, MD   losartan (COZAAR) 50 MG tablet TAKE 1 TABLET EVERY        MORNING(NEW DOSAGE) 1/23/23   Mami Pavon MD   Accu-Chek Softclix Lancets MISC Test twice daily 12/6/22   OC Eddy CNP   blood glucose test strips (ASCENSIA AUTODISC VI;ONE TOUCH ULTRA TEST VI) strip 1 each by In Vitro route 2 times daily As needed. 12/6/22   OC Eddy CNP   blood glucose monitor kit and supplies Dispense sufficient amount for indicated testing frequency plus additional to accommodate PRN testing needs. Dispense all needed supplies to include: monitor, strips, lancing device, lancets, control solutions, alcohol swabs.  12/6/22   OC Eddy CNP   dulaglutide (TRULICITY) 1.5 NB/8.8IQ SC injection ADMINISTER 1.5 MG UNDER THE SKIN 1 TIME A WEEK 11/30/22   OC Eddy CNP   sertraline (ZOLOFT) 50 MG tablet Take one tablet daily 7/22/22   OC Eddy CNP   montelukast (SINGULAIR) 10 MG tablet Take one tablet daily 7/22/22   OC Eddy CNP   metFORMIN (GLUCOPHAGE-XR) 500 MG extended release tablet Take 4 tablets daily with breakfast 7/22/22   OC Eddy CNP   pantoprazole (PROTONIX) 40 MG tablet TAKE 1 TABLET BY MOUTH TWICE DAILY 7/22/22   OC Eddy CNP   FLOVENT DISKUS 100 MCG/BLIST AEPB inhaler INHALE 2 PUFFS INTO THE LUNGS DAILY  Patient taking differently: Inhale 1 puff into the lungs daily 4/22/22   OC Eddy CNP   norethindrone-ethinyl estradiol ([address] 7/7/7, 28,) 0.5/0.75/1-35 MG-MCG per tablet Take 1 tablet by mouth daily 4/6/22   Dorene Kang MD   loratadine (CLARITIN) 10 MG tablet Take 10 mg by mouth daily. Historical Provider, MD       Allergies:  Fluoxetine, Lisinopril, Oseltamivir phosphate, Lexapro [escitalopram oxalate], Methylisothiazolinone, and Sulfa antibiotics    Social History:  The patient currently lives home    TOBACCO:   reports that she has never smoked. She has never used smokeless tobacco.  ETOH:   reports current alcohol use of about 1.0 standard drink per week. Family History:  Reviewed in detail and negative for DM, Early CAD, Cancer, CVA. Positive as follows:        Problem Relation Age of Onset    Alzheimer's Disease Father     Cancer Sister         peritoneal    Other Brother         SARA    Diabetes Maternal Grandmother        REVIEW OF SYSTEMS:    and as noted in the HPI. All other systems reviewed and negative. PHYSICAL EXAM:    /75   Pulse (!) 110   Temp 98.2 °F (36.8 °C) (Oral)   Resp 18   Ht 5' 7\" (1.702 m)   Wt (!) 312 lb 2.7 oz (141.6 kg)   LMP  (Approximate) Comment: \"4 weeks ago\"  SpO2 95%   BMI 48.89 kg/m²     General appearance: Currently no acute respiratory distress, abdominal pain seems to have improved, alert and oriented x4  HEENT Normal cephalic, atraumatic without obvious deformity. Pupils equal, round, and reactive to light. Extra ocular muscles intact. Dry mucous membranes, anicteric sclera  Neck: Supple, no JVD  Lungs: Clear breath sounds bilaterally  Heart: Regular rate and rhythm, no murmurs detected  Abdomen: Epigastric tenderness with some right upper quadrant tenderness noted on palpation, soft overall, nondistended, active bowel sounds  Extremities: No edema  Skin: No rashes  Neurologic: Grossly intact neurologically  Mental status: Alert, oriented, thought content appropriate.   Capillary Refill: Acceptable  < 3 seconds  Peripheral Pulses: +3 Easily felt, not easily obliterated with pressure      01/24/23 1644  CT ABDOMEN PELVIS W IV CONTRAST Additional Contrast? None   Performed: 01/24/23 1558  Final        Impression: Diffuse hepatic steatosis. Mildly distended gallbladder with cholelithiasis. 01/24/23 825 Cata NASCIMENTO   Performed: 01/24/23 1515  Final        Impression: Cholelithiasis without evidence of cholecystitis. Increased echogenicity of the liver compatible with diffuse hepatocellular disease, most commonly due to hepatic steatosis. CBC   Recent Labs     01/24/23  1339 01/25/23  0608   WBC 14.7* 18.6*   HGB 13.7 11.8*   HCT 42.0 36.4    245      RENAL  Recent Labs     01/24/23  1339 01/25/23  0608    133*   K 4.3 3.3*   CL 98* 96*   CO2 18* 22   BUN 17 8   CREATININE 0.6 0.6     LFT'S  Recent Labs     01/24/23  1339 01/25/23  0608   AST 27 21   ALT 20 18   BILITOT 0.3 0.6   ALKPHOS 130* 102     COAG  No results for input(s): INR in the last 72 hours. CARDIAC ENZYMES  No results for input(s): CKTOTAL, CKMB, CKMBINDEX, TROPONINI in the last 72 hours.     U/A:    Lab Results   Component Value Date/Time    NITRITE positive 06/21/2022 10:07 AM    COLORU Yellow 01/24/2023 02:41 PM    WBCUA 0 01/24/2023 02:41 PM    RBCUA 4 01/24/2023 02:41 PM    MUCUS 3+ 05/06/2020 02:36 PM    BACTERIA None Seen 01/24/2023 02:41 PM    CLARITYU Clear 01/24/2023 02:41 PM    SPECGRAV 1.022 01/24/2023 02:41 PM    LEUKOCYTESUR Negative 01/24/2023 02:41 PM    BLOODU SMALL 01/24/2023 02:41 PM    GLUCOSEU Negative 01/24/2023 02:41 PM       ABG  No results found for: SUY6CDD, BEART, [id number], PHART, [place], [place]        Active Hospital Problems    Diagnosis Date Noted    Sepsis ([facility] [address] [A41.9] 01/24/2023     Priority: Medium    Abdominal pain [R10.9] 01/24/2023     Priority: Medium    [facility] 2[facility] [E11.9] 01/24/2023     Priority: Medium    Hyperlipidemia LDL goal <100 [E78.5] 06/30/2016         PHYSICIANS CERTIFICATION:    I certify that Sarah Cole is expected to be hospitalized for greater than 2 midnights based on the following assessment and plan:      ASSESSMENT/PLAN:  Sepsis  Abdominal pain  Type 2 diabetes  Hyper lipidemia      Plan:  Currently uncertain etiology for recent sepsis but could still be related to choledocholithiasis or cholecystitis  Continue patient on Zosyn for broad-spectrum coverage of sepsis for now  Continue patient on normal saline 100/h x 20 hours for IV fluid hydration  Dilaudid as needed for abdominal pain  General surgery consult and GI consult for the morning  N.p.o. for now  Repeat labs daily    DVT Prophylaxis: Lovenox  Diet: Diet NPO Exceptions are: Ice Chips, Sips of Water with Meds  Code Status: Full Code  PT/OT Eval Status: Ambulatory    Dispo -pending clinical course       Art Pak DO    Thank you Corey Bai MD for the opportunity to be involved in this patient's care. If you have any questions or concerns please feel free to contact me at [phone].

## 2023-01-26 ENCOUNTER — APPOINTMENT (OUTPATIENT)
Dept: NUCLEAR MEDICINE | Age: 48
DRG: 854 | End: 2023-01-26
Payer: COMMERCIAL

## 2023-01-26 LAB
A/G RATIO: 1 (ref 1.1–2.2)
ALBUMIN SERPL-MCNC: 3.1 G/DL (ref 3.4–5)
ALP BLD-CCNC: 99 U/L (ref 40–129)
ALT SERPL-CCNC: 12 U/L (ref 10–40)
ANION GAP SERPL CALCULATED.3IONS-SCNC: 11 MMOL/L (ref 3–16)
AST SERPL-CCNC: 12 U/L (ref 15–37)
BASOPHILS ABSOLUTE: 0.1 K/UL (ref 0–0.2)
BASOPHILS RELATIVE PERCENT: 0.5 %
BILIRUB SERPL-MCNC: 1.2 MG/DL (ref 0–1)
BUN BLDV-MCNC: 7 MG/DL (ref 7–20)
CALCIUM SERPL-MCNC: 8.3 MG/DL (ref 8.3–10.6)
CHLORIDE BLD-SCNC: 102 MMOL/L (ref 99–110)
CO2: 23 MMOL/L (ref 21–32)
CREAT SERPL-MCNC: 0.5 MG/DL (ref 0.6–1.1)
EOSINOPHILS ABSOLUTE: 0 K/UL (ref 0–0.6)
EOSINOPHILS RELATIVE PERCENT: 0.3 %
GFR SERPL CREATININE-BSD FRML MDRD: >60 ML/MIN/{1.73_M2}
GLUCOSE BLD-MCNC: 168 MG/DL (ref 70–99)
GLUCOSE BLD-MCNC: 168 MG/DL (ref 70–99)
GLUCOSE BLD-MCNC: 172 MG/DL (ref 70–99)
GLUCOSE BLD-MCNC: 177 MG/DL (ref 70–99)
GLUCOSE BLD-MCNC: 178 MG/DL (ref 70–99)
GLUCOSE BLD-MCNC: 178 MG/DL (ref 70–99)
GLUCOSE BLD-MCNC: 220 MG/DL (ref 70–99)
HCT VFR BLD CALC: 38 % (ref 36–48)
HEMOGLOBIN: 12.1 G/DL (ref 12–16)
LYMPHOCYTES ABSOLUTE: 2.1 K/UL (ref 1–5.1)
LYMPHOCYTES RELATIVE PERCENT: 11.2 %
MAGNESIUM: 2.1 MG/DL (ref 1.8–2.4)
MCH RBC QN AUTO: 27.6 PG (ref 26–34)
MCHC RBC AUTO-ENTMCNC: 31.8 G/DL (ref 31–36)
MCV RBC AUTO: 86.9 FL (ref 80–100)
MONOCYTES ABSOLUTE: 1.1 K/UL (ref 0–1.3)
MONOCYTES RELATIVE PERCENT: 5.8 %
NEUTROPHILS ABSOLUTE: 15.6 K/UL (ref 1.7–7.7)
NEUTROPHILS RELATIVE PERCENT: 82.2 %
PDW BLD-RTO: 15.8 % (ref 12.4–15.4)
PERFORMED ON: ABNORMAL
PLATELET # BLD: 233 K/UL (ref 135–450)
PMV BLD AUTO: 8.9 FL (ref 5–10.5)
POTASSIUM SERPL-SCNC: 3.5 MMOL/L (ref 3.5–5.1)
PROCALCITONIN: 0.24 NG/ML (ref 0–0.15)
RBC # BLD: 4.37 M/UL (ref 4–5.2)
SODIUM BLD-SCNC: 136 MMOL/L (ref 136–145)
TOTAL PROTEIN: 6.3 G/DL (ref 6.4–8.2)
WBC # BLD: 19 K/UL (ref 4–11)

## 2023-01-26 PROCEDURE — 94760 N-INVAS EAR/PLS OXIMETRY 1: CPT

## 2023-01-26 PROCEDURE — 2580000003 HC RX 258: Performed by: STUDENT IN AN ORGANIZED HEALTH CARE EDUCATION/TRAINING PROGRAM

## 2023-01-26 PROCEDURE — 2580000003 HC RX 258: Performed by: NURSE PRACTITIONER

## 2023-01-26 PROCEDURE — 94640 AIRWAY INHALATION TREATMENT: CPT

## 2023-01-26 PROCEDURE — 78226 HEPATOBILIARY SYSTEM IMAGING: CPT

## 2023-01-26 PROCEDURE — 85025 COMPLETE CBC W/AUTO DIFF WBC: CPT

## 2023-01-26 PROCEDURE — 6360000002 HC RX W HCPCS: Performed by: STUDENT IN AN ORGANIZED HEALTH CARE EDUCATION/TRAINING PROGRAM

## 2023-01-26 PROCEDURE — 36415 COLL VENOUS BLD VENIPUNCTURE: CPT

## 2023-01-26 PROCEDURE — APPNB15 APP NON BILLABLE TIME 0-15 MINS: Performed by: NURSE PRACTITIONER

## 2023-01-26 PROCEDURE — 6360000002 HC RX W HCPCS: Performed by: NURSE PRACTITIONER

## 2023-01-26 PROCEDURE — 2500000003 HC RX 250 WO HCPCS: Performed by: NURSE PRACTITIONER

## 2023-01-26 PROCEDURE — 3430000000 HC RX DIAGNOSTIC RADIOPHARMACEUTICAL: Performed by: INTERNAL MEDICINE

## 2023-01-26 PROCEDURE — 84145 PROCALCITONIN (PCT): CPT

## 2023-01-26 PROCEDURE — APPSS15 APP SPLIT SHARED TIME 0-15 MINUTES: Performed by: NURSE PRACTITIONER

## 2023-01-26 PROCEDURE — 80053 COMPREHEN METABOLIC PANEL: CPT

## 2023-01-26 PROCEDURE — 6370000000 HC RX 637 (ALT 250 FOR IP): Performed by: STUDENT IN AN ORGANIZED HEALTH CARE EDUCATION/TRAINING PROGRAM

## 2023-01-26 PROCEDURE — 83735 ASSAY OF MAGNESIUM: CPT

## 2023-01-26 PROCEDURE — 6370000000 HC RX 637 (ALT 250 FOR IP): Performed by: NURSE PRACTITIONER

## 2023-01-26 PROCEDURE — 1200000000 HC SEMI PRIVATE

## 2023-01-26 PROCEDURE — A9537 TC99M MEBROFENIN: HCPCS | Performed by: INTERNAL MEDICINE

## 2023-01-26 RX ORDER — LACTOBACILLUS RHAMNOSUS GG 10B CELL
1 CAPSULE ORAL
Status: DISCONTINUED | OUTPATIENT
Start: 2023-01-26 | End: 2023-01-29 | Stop reason: HOSPADM

## 2023-01-26 RX ORDER — METRONIDAZOLE 500 MG/100ML
500 INJECTION, SOLUTION INTRAVENOUS EVERY 8 HOURS
Status: DISCONTINUED | OUTPATIENT
Start: 2023-01-26 | End: 2023-01-28 | Stop reason: ALTCHOICE

## 2023-01-26 RX ADMIN — PIPERACILLIN AND TAZOBACTAM 3375 MG: 3; .375 INJECTION, POWDER, LYOPHILIZED, FOR SOLUTION INTRAVENOUS at 20:26

## 2023-01-26 RX ADMIN — PANTOPRAZOLE SODIUM 40 MG: 40 TABLET, DELAYED RELEASE ORAL at 20:20

## 2023-01-26 RX ADMIN — METRONIDAZOLE 500 MG: 500 INJECTION, SOLUTION INTRAVENOUS at 13:07

## 2023-01-26 RX ADMIN — HYDROMORPHONE HYDROCHLORIDE 0.5 MG: 1 INJECTION, SOLUTION INTRAMUSCULAR; INTRAVENOUS; SUBCUTANEOUS at 05:45

## 2023-01-26 RX ADMIN — METRONIDAZOLE 500 MG: 500 INJECTION, SOLUTION INTRAVENOUS at 18:12

## 2023-01-26 RX ADMIN — MONTELUKAST 10 MG: 10 TABLET, FILM COATED ORAL at 13:04

## 2023-01-26 RX ADMIN — SODIUM CHLORIDE: 9 INJECTION, SOLUTION INTRAVENOUS at 05:51

## 2023-01-26 RX ADMIN — PIPERACILLIN AND TAZOBACTAM 3375 MG: 3; .375 INJECTION, POWDER, LYOPHILIZED, FOR SOLUTION INTRAVENOUS at 14:06

## 2023-01-26 RX ADMIN — HYDROMORPHONE HYDROCHLORIDE 0.5 MG: 1 INJECTION, SOLUTION INTRAMUSCULAR; INTRAVENOUS; SUBCUTANEOUS at 18:10

## 2023-01-26 RX ADMIN — Medication 1 CAPSULE: at 13:03

## 2023-01-26 RX ADMIN — ENOXAPARIN SODIUM 30 MG: 100 INJECTION SUBCUTANEOUS at 20:20

## 2023-01-26 RX ADMIN — LOSARTAN POTASSIUM 25 MG: 25 TABLET, FILM COATED ORAL at 13:04

## 2023-01-26 RX ADMIN — PIPERACILLIN AND TAZOBACTAM 3375 MG: 3; .375 INJECTION, POWDER, LYOPHILIZED, FOR SOLUTION INTRAVENOUS at 05:54

## 2023-01-26 RX ADMIN — FLUTICASONE PROPIONATE 2 PUFF: 110 AEROSOL, METERED RESPIRATORY (INHALATION) at 07:33

## 2023-01-26 RX ADMIN — Medication 6 MILLICURIE: at 10:42

## 2023-01-26 RX ADMIN — HYDROMORPHONE HYDROCHLORIDE 0.5 MG: 1 INJECTION, SOLUTION INTRAMUSCULAR; INTRAVENOUS; SUBCUTANEOUS at 22:17

## 2023-01-26 RX ADMIN — INSULIN LISPRO 1 UNITS: 100 INJECTION, SOLUTION INTRAVENOUS; SUBCUTANEOUS at 20:26

## 2023-01-26 RX ADMIN — HYDROMORPHONE HYDROCHLORIDE 0.5 MG: 1 INJECTION, SOLUTION INTRAMUSCULAR; INTRAVENOUS; SUBCUTANEOUS at 14:04

## 2023-01-26 RX ADMIN — PANTOPRAZOLE SODIUM 40 MG: 40 TABLET, DELAYED RELEASE ORAL at 13:03

## 2023-01-26 RX ADMIN — ACETAMINOPHEN 650 MG: 325 TABLET ORAL at 20:19

## 2023-01-26 RX ADMIN — ENOXAPARIN SODIUM 30 MG: 100 INJECTION SUBCUTANEOUS at 13:04

## 2023-01-26 ASSESSMENT — PAIN DESCRIPTION - LOCATION
LOCATION: ABDOMEN
LOCATION: ABDOMEN

## 2023-01-26 ASSESSMENT — PAIN SCALES - GENERAL
PAINLEVEL_OUTOF10: 8
PAINLEVEL_OUTOF10: 0
PAINLEVEL_OUTOF10: 6
PAINLEVEL_OUTOF10: 7
PAINLEVEL_OUTOF10: 8
PAINLEVEL_OUTOF10: 0
PAINLEVEL_OUTOF10: 9

## 2023-01-26 ASSESSMENT — PAIN DESCRIPTION - FREQUENCY: FREQUENCY: CONTINUOUS

## 2023-01-26 ASSESSMENT — PAIN - FUNCTIONAL ASSESSMENT: PAIN_FUNCTIONAL_ASSESSMENT: PREVENTS OR INTERFERES SOME ACTIVE ACTIVITIES AND ADLS

## 2023-01-26 ASSESSMENT — PAIN DESCRIPTION - PAIN TYPE: TYPE: ACUTE PAIN

## 2023-01-26 ASSESSMENT — PAIN DESCRIPTION - ONSET: ONSET: ON-GOING

## 2023-01-26 ASSESSMENT — PAIN DESCRIPTION - ORIENTATION: ORIENTATION: RIGHT

## 2023-01-26 ASSESSMENT — PAIN DESCRIPTION - DESCRIPTORS: DESCRIPTORS: ACHING;DISCOMFORT

## 2023-01-26 NOTE — PROGRESS NOTES
INPATIENT PROGRESS NOTE        IDENTIFYING DATA/REASON FOR CONSULTATION   PATIENT:  Vernon Carson  MRN:  [id number]  ADMIT DATE: 1/24/2023  TIME OF EVALUATION: 1/26/2023 10:12 AM  HOSPITAL STAY:   LOS: 2 days   CONSULTING PHYSICIAN: Carolina Bullard MD   REASON FOR CONSULTATION: abdominal pain    Subjective:    Patient seen in follow up     EGD yesterday showed large amount of food retained in the stomach suggestive of Gastroparesis. The stomach as well as esophagus and duodenum otherwise appeared normal     Pt reports she still has abdominal pain, describes it as of \"flutter\" radiating across her abdomen.       MEDICATIONS   SCHEDULED:  metroNIDAZOLE, 500 mg, Q8H  lactobacillus, 1 capsule, Daily with breakfast  piperacillin-tazobactam, 3,375 mg, Q8H  insulin lispro, 0-4 Units, Q4H  sodium chloride flush, 5-40 mL, 2 times per day  enoxaparin, 30 mg, BID  fluticasone, 2 puff, Daily  losartan, 25 mg, Daily  montelukast, 10 mg, Daily  pantoprazole, 40 mg, BID      FLUIDS/DRIPS:     sodium chloride 100 mL/hr at 01/26/23 0551    dextrose       PRNs: technetium mebrofenin, 6 millicurie, ONCE PRN  sodium chloride flush, 5-40 mL, PRN  sodium chloride, , PRN  acetaminophen, 650 mg, Q6H PRN   Or  acetaminophen, 650 mg, Q6H PRN  albuterol sulfate HFA, 2 puff, Q4H PRN  glucose, 4 tablet, PRN  dextrose bolus, 125 mL, PRN   Or  dextrose bolus, 250 mL, PRN  glucagon (rDNA), 1 mg, PRN  dextrose, , Continuous PRN  HYDROmorphone, 0.25 mg, Q4H PRN   Or  HYDROmorphone, 0.5 mg, Q4H PRN  naloxone, 0.4 mg, PRN  hydrALAZINE, 5 mg, Q4H PRN      ALLERGIES:    Allergies   Allergen Reactions    Fluoxetine Other (See Comments)     Overtim, head ache    Lisinopril Other (See Comments)     cough    Oseltamivir Phosphate Other (See Comments)    Lexapro [Escitalopram Oxalate] Other (See Comments)     sleepy    Methylisothiazolinone Itching and Rash    Sulfa Antibiotics Rash         PHYSICAL EXAM   VITALS:  /83   Pulse (!) 107   Temp 99.7 °F (37.6 °C) (Oral)   Resp 18   Ht 5' 7\" (1.702 m)   Wt (!) 312 lb 9.8 oz (141.8 kg)   LMP  (Approximate) Comment: \"4 weeks ago\"  SpO2 93%   BMI 48.96 kg/m²   TEMPERATURE:  Current - Temp: 99.7 °F (37.6 °C); Max - Temp  Av °F (37.2 °C)  Min: 97.6 °F (36.4 °C)  Max: 101.1 °F (38.4 °C)    Physical Exam:  General appearance: alert, cooperative, no distress, appears stated age  Eyes: Anicteric  Head: Normocephalic, without obvious abnormality  Lungs: clear to auscultation bilaterally, Normal Effort  Heart: regular rate and rhythm, normal S1 and S2, no murmurs or rubs  Abdomen: soft, non-distended, non-tender. Bowel sounds normal.   Extremities: atraumatic, no cyanosis or edema  Skin: warm and dry, no jaundice  Neuro: Grossly intact, A&OX3    LABS AND IMAGING   Laboratory   Recent Labs     23  1339 23  0608 23  0716   WBC 14.7* 18.6* 19.0*   HGB 13.7 11.8* 12.1   HCT 42.0 36.4 38.0   MCV 86.3 85.5 86.9    245 233     Recent Labs     23  1339 23  0608 23  0716    133* 136   K 4.3 3.3* 3.5   CL 98* 96* 102   CO2 18* 22 23   BUN 17 8 7   CREATININE 0.6 0.6 0.5*     Recent Labs     23  1339 23  0608 23  0716   AST 27 21 12*   ALT 20 18 12   BILITOT 0.3 0.6 1.2*   ALKPHOS 130* 102 99     Recent Labs     23  1339   LIPASE 42.0     No results for input(s): PROTIME, INR in the last 72 hours. Imaging  CT ABDOMEN PELVIS W IV CONTRAST Additional Contrast? None   Final Result   Diffuse hepatic steatosis. Mildly distended gallbladder with cholelithiasis. US GALLBLADDER RUQ   Final Result   Cholelithiasis without evidence of cholecystitis. Increased echogenicity of the liver compatible with diffuse hepatocellular   disease, most commonly due to hepatic steatosis.          NM HEPATOBILIARY    (Results Pending)       Endoscopy      ASSESSMENT AND RECOMMENDATIONS   Jaden Weaver is a 41 y.o. female with PMH of HTN, SARA, obesity who presented on 1/24/2023 with abdominal pain. CT showed mildly distended gallbladder with cholelithiasis o/w normal.  RUQ US showed no evidence of acute cholecystitis. EGD completed 1/25 that showed large amt of retained food in the stomach suggestive of gastroparesis    Abdominal pain. Differentials include acute gastroenteritis, functional dyspepsia, gallbladder disease, med induced (?trulicity). HIDA scan ordered  Suspected gastroparesis. EGD with large amt of retained food in stomach. May consider trial of Reglan if symptoms persist and gallbladder work up unrevealing    RECOMMENDATIONS:    HIDA today. Follow up with results  Continue PPI    If you have any questions or need any further information, please feel free to contact us [phone]. Thank you for allowing us to participate in the care of Francisco J Nava. The note was completed using Dragon voice recognition transcription. Every effort was made to ensure accuracy; however, inadvertent transcription errors may be present despite my best efforts to edit errors. Gabriel ROMERO    Attending physician addendum:    I have personally seen and examined the patient, reviewed the patient's medical record and pertinent labs and clinical imaging. I have personally staffed the case with Gabriel ROMERO. I agree with her consultation note, exam findings, assessment and plans  as written above. I have made appropriate modifications and edited her assessment and plan where needed to reflect my impression and plans for this patient. Francisco J Nava is a 40 YO female with a PMH of HTN, SARA, obesity who presented on 1/24/2023 with abdominal pain. We have been consulted regarding abdominal pain. Acute onset of abdominal pain starting yesterday AM. EGD with retained food suggestive of Gastroparesis which may be secondary to Trulicity but this is not a new medication. Checking HIDA scan. Continue on PPI. Await HIDA scan results.       Thank you for allowing me to participate in this patient's care. If there are any questions or concerns regarding this patient, or the plan we have set in place, please feel free to contact me at [phone].      Claude Butler MD

## 2023-01-26 NOTE — PLAN OF CARE
Problem: Discharge Planning  Goal: Discharge to home or other facility with appropriate resources  1/26/2023 0055 by Demetria King RN  Outcome: Progressing  Flowsheets (Taken 1/25/2023 2053)  Discharge to home or other facility with appropriate resources: Identify barriers to discharge with patient and caregiver  1/25/2023 1150 by Richa Pacheco RN  Outcome: Progressing  Flowsheets (Taken 1/25/2023 1150)  Discharge to home or other facility with appropriate resources: Identify barriers to discharge with patient and caregiver     Problem: Pain  Goal: Verbalizes/displays adequate comfort level or baseline comfort level  1/26/2023 0055 by Demetria King RN  Outcome: Progressing  1/25/2023 1150 by Richa Pacheco RN  Outcome: Progressing  Flowsheets (Taken 1/25/2023 1150)  Verbalizes/displays adequate comfort level or baseline comfort level: Encourage patient to monitor pain and request assistance     Problem: ABCDS Injury Assessment  Goal: Absence of physical injury  1/26/2023 0055 by Demetria King RN  Outcome: Progressing  1/25/2023 1150 by Richa Pacheco RN  Outcome: Progressing  Flowsheets (Taken 1/25/2023 1150)  Absence of Physical Injury: Implement safety measures based on patient assessment     Problem: Gastrointestinal - Adult  Goal: Minimal or absence of nausea and vomiting  1/26/2023 0055 by Demetria King RN  Outcome: Progressing  Flowsheets (Taken 1/25/2023 2053)  Minimal or absence of nausea and vomiting: Administer IV fluids as ordered to ensure adequate hydration  1/25/2023 1150 by Richa Pacheco RN  Outcome: Progressing  Flowsheets (Taken 1/25/2023 1150)  Minimal or absence of nausea and vomiting: Administer IV fluids as ordered to ensure adequate hydration  Goal: Maintains or returns to baseline bowel function  Outcome: Progressing  Goal: Maintains adequate nutritional intake  Outcome: Progressing     Problem: Metabolic/Fluid and Electrolytes - Adult  Goal: Electrolytes maintained within normal limits  1/26/2023 0055 by Lucien Blank RN  Outcome: Progressing  Flowsheets (Taken 1/25/2023 2053)  Electrolytes maintained within normal limits: Monitor labs and assess patient for signs and symptoms of electrolyte imbalances  1/25/2023 1150 by Avis Briggs RN  Outcome: Progressing  Flowsheets (Taken 1/25/2023 1150)  Electrolytes maintained within normal limits: Monitor labs and assess patient for signs and symptoms of electrolyte imbalances  Goal: Hemodynamic stability and optimal renal function maintained  Outcome: Progressing  Flowsheets (Taken 1/25/2023 2053)  Hemodynamic stability and optimal renal function maintained: Monitor labs and assess for signs and symptoms of volume excess or deficit  Goal: Glucose maintained within prescribed range  Outcome: Progressing  Flowsheets (Taken 1/25/2023 2053)  Glucose maintained within prescribed range: Monitor blood glucose as ordered     Problem: Cardiovascular - Adult  Goal: Maintains optimal cardiac output and hemodynamic stability  1/26/2023 0055 by Lucien Blank RN  Outcome: Progressing  Flowsheets (Taken 1/25/2023 2053)  Maintains optimal cardiac output and hemodynamic stability: Monitor blood pressure and heart rate  1/25/2023 1150 by Avis Briggs RN  Outcome: Progressing  Flowsheets (Taken 1/25/2023 1150)  Maintains optimal cardiac output and hemodynamic stability: Monitor blood pressure and heart rate  Goal: Absence of cardiac dysrhythmias or at baseline  Outcome: Progressing  Flowsheets (Taken 1/25/2023 2053)  Absence of cardiac dysrhythmias or at baseline: Monitor cardiac rate and rhythm     Problem: Chronic Conditions and Co-morbidities  Goal: Patient's chronic conditions and co-morbidity symptoms are monitored and maintained or improved  1/26/2023 0055 by Lucien Blank RN  Outcome: Progressing  Flowsheets (Taken 1/25/2023 2053)  Care Plan - Patient's Chronic Conditions and Co-Morbidity Symptoms are Monitored and Maintained or Improved: Monitor and assess patient's chronic conditions and comorbid symptoms for stability, deterioration, or improvement  1/25/2023 1150 by Quentin Gutierrez RN  Flowsheets (Taken 1/25/2023 1150)  Care Plan - Patient's Chronic Conditions and Co-Morbidity Symptoms are Monitored and Maintained or Improved: Monitor and assess patient's chronic conditions and comorbid symptoms for stability, deterioration, or improvement

## 2023-01-26 NOTE — PROGRESS NOTES
Notified by Asad Dyson that pt is having PVC's, PAC's and blocked PAC's causing her rhythm to be irregular at times. Pt is resting, asymptomatic. Secure message sent to Dr. William Mabry. Instructed to monitor for now.    Electronically signed by Marc Carpio RN on 1/26/2023 at 3:22 AM

## 2023-01-26 NOTE — PLAN OF CARE
Problem: Discharge Planning  Goal: Discharge to home or other facility with appropriate resources  Outcome: Progressing     Problem: Pain  Goal: Verbalizes/displays adequate comfort level or baseline comfort level  Outcome: Progressing     Problem: ABCDS Injury Assessment  Goal: Absence of physical injury  Outcome: Progressing     Problem: Gastrointestinal - Adult  Goal: Minimal or absence of nausea and vomiting  Outcome: Progressing  Goal: Maintains or returns to baseline bowel function  Outcome: Progressing  Goal: Maintains adequate nutritional intake  Outcome: Progressing     Problem: Metabolic/Fluid and Electrolytes - Adult  Goal: Electrolytes maintained within normal limits  Outcome: Progressing  Goal: Hemodynamic stability and optimal renal function maintained  Outcome: Progressing  Goal: Glucose maintained within prescribed range  Outcome: Progressing     Problem: Cardiovascular - Adult  Goal: Maintains optimal cardiac output and hemodynamic stability  Outcome: Progressing  Goal: Absence of cardiac dysrhythmias or at baseline  Outcome: Progressing     Problem: Chronic Conditions and Co-morbidities  Goal: Patient's chronic conditions and co-morbidity symptoms are monitored and maintained or improved  Outcome: Progressing

## 2023-01-26 NOTE — PROGRESS NOTES
[facility] General Surgery                                   Daily Progress Note                                                         Pt Name: Juan Pizarro  Medical Record Number: [id number]  Date of Birth 1975   Today's Date: 1/26/2023  Chief Complaint   Patient presents with    Abdominal Pain     Stabbing pain in center of stomach starting 0830. Emesis on going since. \"Pain wont go away\"         ASSESSMENT/PLAN  Abdominal pain              EGD 1/25 with concern for gastroparesis              Other imaging with question of gallbladder pathology              F/U HIDA, done awiting interpretation              If positive will consider cholecystectomy later today vs tomorrow            Marlow Saint is resting in bed. CPAP. Minimal abdominal tenderness. []Pain []nausea  []vomiting  [x]passing flatus   []BM      OBJECTIVE  VITALS:  height is 5' 7\" (1.702 m) and weight is 312 lb 9.8 oz (141.8 kg) (abnormal). Her oral temperature is 99.7 °F (37.6 °C). Her blood pressure is 121/83 and her pulse is 107 (abnormal). Her respiration is 18 and oxygen saturation is 93%. INTAKE/OUTPUT:    Intake/Output Summary (Last 24 hours) at 1/26/2023 1220  Last data filed at 1/26/2023 0101  Gross per 24 hour   Intake 1655.62 ml   Output --   Net 1655.62 ml     GENERAL: alert, cooperative, no distress  LUNGS: clear to ausculation, without wheezes, rales or rhonci  HEART: normal rate and regular rhythm  ABDOMEN: Soft, mild RUQ tenderness, non distended. EXTREMITY: no cyanosis, clubbing or edema    I/O last 3 completed shifts: In: 1655.6 [P.O.:840;  I.V.:709.6; IV Piggyback:106.1]  Out: -       LABS  Recent Labs     01/24/23  1441 01/25/23  0608 01/26/23  0716   WBC  --    < > 19.0*   HGB  --    < > 12.1   HCT  --    < > 38.0   PLT  --    < > 233   NA  --    < > 136   K  --    < > 3.5   CL  --    < > 102   CO2  --    < > 23   BUN  --    < > 7   CREATININE  --    < > 0.5*   MG  --    < > 2.10   CALCIUM  --    < > 8.3   AST  --    < > 12*   ALT  --    < > 12   BILITOT  --    < > 1.2*   NITRU Negative  --   --    COLORU Yellow  --   --    BACTERIA None Seen  --   --     < > = values in this interval not displayed. EDUCATION  Patient educated about Disease Process, Medications, Smoking Cessation, Oxygenation, Incentive Spirometry and Deep Breath and Cough, Diabetes, Hyperlipidemia, Smoking Cessation, Nutrition, Exercise and Hypertension    Electronically signed by OC Rossi CNP on 1/26/2023 at 12:20 PM      [facility]   [phone] Office  [phone] Fax     As above    Pain is about the same  EGD noted, HIDA noted    Given positive HIDA will proceed with cholecystectomy  Suspect there is more than one etiology of her symptoms so it is difficult to predict how much she will improve    The risks, benefits and alternatives to the planned procedure were discussed. Patient expressed an understanding and is willing to proceed.     Electronically signed by Sirisha Montano MD on 1/26/2023 at 3:13 PM

## 2023-01-26 NOTE — PROGRESS NOTES
Hospitalist Progress Note      PCP: Zeynep Pimentel MD    Date of Admission: 1/24/2023    Chief Complaint:   Chief Complaint   Patient presents with    Abdominal Pain     Stabbing pain in center of stomach starting 0830. Emesis on going since. \"Pain wont go away\"      Hospital Course: The patient is a 41 y.o. female with past medical history as below including diabetes and high blood pressure and high cholesterol who presents to [facility] with acute onset and waxing and waning epigastric abdominal pain that is sharp in nature and has been occurring since this past morning. She had not had pain like this before or more recently. She notes that it was associated with decreased appetite throughout the rest of the day and increasing intermittent episodes of nausea and vomiting. At 1 point she did feel as though the abdominal pain was associate with her having vomiting and then feeling lightheaded to the point of passing out but she did not actually pass out. She is uncertain as to what may be causing this abdominal pain but she does not feel as though she ate anything out of the ordinary to trigger the symptoms and no one in her family has had similar symptoms. She denies any other recent symptoms prior to today's abdominal pain nausea vomiting of fever, chills, dizziness, syncope, dysuria, blood in urine/stool/sputum/vomitus, rashes, diarrhea, chest pain, shortness of breath. 01/25: Patient still having significant abdominal pain. She states that she has history of a sliding hiatal hernia, but states that she has never had pain like this before. Discussed CT abdomen and ultrasound results with the patient. GI is planning for EGD this afternoon. Appreciate their input. Subjective:   EGD revealing large amount of food retained in the stomach, suggesting gastroparesis. HIDA scan: Nonvisualization of the gallbladder suggesting cystic duct obstruction/acute cholecystitis.   General surgery is with plan for possible cholecystectomy today versus tomorrow. Patient still febrile on zosyn, WBC up to 19k. Will add Flagyl. Medications:  Reviewed    Infusion Medications    sodium chloride 100 mL/hr at 01/26/23 0551    dextrose       Scheduled Medications    metroNIDAZOLE  500 mg IntraVENous Q8H    lactobacillus  1 capsule Oral Daily with breakfast    piperacillin-tazobactam  3,375 mg IntraVENous Q8H    insulin lispro  0-4 Units SubCUTAneous Q4H    sodium chloride flush  5-40 mL IntraVENous 2 times per day    enoxaparin  30 mg SubCUTAneous BID    fluticasone  2 puff Inhalation Daily    losartan  25 mg Oral Daily    montelukast  10 mg Oral Daily    pantoprazole  40 mg Oral BID     PRN Meds: sodium chloride flush, sodium chloride, acetaminophen **OR** acetaminophen, albuterol sulfate HFA, glucose, dextrose bolus **OR** dextrose bolus, glucagon (rDNA), dextrose, HYDROmorphone **OR** HYDROmorphone, naloxone, hydrALAZINE      Intake/Output Summary (Last 24 hours) at 1/26/2023 1401  Last data filed at 1/26/2023 0101  Gross per 24 hour   Intake 1655.62 ml   Output --   Net 1655.62 ml       Physical Exam Performed:    /75   Pulse (!) 106   Temp 97.4 °F (36.3 °C) (Oral)   Resp 18   Ht 5' 7\" (1.702 m)   Wt (!) 312 lb 9.8 oz (141.8 kg)   LMP  (Approximate) Comment: \"4 weeks ago\"  SpO2 95%   BMI 48.96 kg/m²     General appearance: No apparent distress, appears stated age and cooperative. HEENT: Pupils equal, round, and reactive to light. Conjunctivae/corneas clear. Neck: Supple, with full range of motion. No jugular venous distention. Trachea midline. Respiratory:  Normal respiratory effort. Clear to auscultation, bilaterally without Rales/Wheezes/Rhonchi. Cardiovascular: Regular rate and rhythm with normal S1/S2 without murmurs, rubs or gallops. Abdomen: Soft, tender to palpation especially to right upper quadrant, guarding, non-distended with normal bowel sounds.   Musculoskeletal: No clubbing, cyanosis or edema bilaterally. Full range of motion without deformity. Skin: Skin color, texture, turgor normal.  No rashes or lesions. Neurologic:  Neurovascularly intact without any focal sensory/motor deficits. Cranial nerves: II-XII intact, grossly non-focal.  Psychiatric: Alert and oriented, thought content appropriate, normal insight  Capillary Refill: Brisk,< 3 seconds   Peripheral Pulses: +2 palpable, equal bilaterally       Labs:   Recent Labs     01/24/23  1339 01/25/23  0608 01/26/23  0716   WBC 14.7* 18.6* 19.0*   HGB 13.7 11.8* 12.1   HCT 42.0 36.4 38.0    245 233     Recent Labs     01/24/23  1339 01/25/23  0608 01/26/23  0716    133* 136   K 4.3 3.3* 3.5   CL 98* 96* 102   CO2 18* 22 23   BUN 17 8 7   CREATININE 0.6 0.6 0.5*   CALCIUM 10.2 8.2* 8.3     Recent Labs     01/24/23  1339 01/25/23  0608 01/26/23  0716   AST 27 21 12*   ALT 20 18 12   BILITOT 0.3 0.6 1.2*   ALKPHOS 130* 102 99     No results for input(s): INR in the last 72 hours. Recent Labs     01/25/23  1213   TROPONINI <0.01       Urinalysis:      Lab Results   Component Value Date/Time    NITRU Negative 01/24/2023 02:41 PM    WBCUA 0 01/24/2023 02:41 PM    BACTERIA None Seen 01/24/2023 02:41 PM    RBCUA 4 01/24/2023 02:41 PM    BLOODU SMALL 01/24/2023 02:41 PM    SPECGRAV 1.022 01/24/2023 02:41 PM    GLUCOSEU Negative 01/24/2023 02:41 PM       Radiology:  Kajal Alas   Final Result   Nonvisualization of the gallbladder suggesting cystic duct obstruction/acute   cholecystitis. CT ABDOMEN PELVIS W IV CONTRAST Additional Contrast? None   Final Result   Diffuse hepatic steatosis. Mildly distended gallbladder with cholelithiasis. US GALLBLADDER RUQ   Final Result   Cholelithiasis without evidence of cholecystitis. Increased echogenicity of the liver compatible with diffuse hepatocellular   disease, most commonly due to hepatic steatosis.                  Assessment/Plan:    C/Rozina Roosevelt [id number] Problems    Diagnosis     Calculus of gallbladder without cholecystitis without obstruction [K80.20]      Priority: Medium    Sepsis ([facility] [address] [A41.9]      Priority: Medium    Abdominal pain [R10.9]      Priority: Medium    DM2 (diabetes mellitus, type 2) ([facility] [address] [E11.9]      Priority: Medium    Hyperlipidemia LDL goal <100 [E78.5]      Sepsis of unclear etiology. POA: tachycardia, tachypnea, leukocytosis, febrile. Suspicion for GI   - CT abd/pel: Diffuse hepatic steatosis. Mildly distended gallbladder with cholelithiasis. -Right upper quadrant ultrasound with cholelithiasis without evidence of cholecystitis. Increased echogenicity of the liver compatible with diffuse hepatocellular disease, most commonly due to hepatic steatosis  - IV Zosyn + flagyl  - GI and general surgery consulted, appreciate their input  - Received 30ml/kg bolus in ED, keep n.p.o. until consultations completed  - IV pain meds as needed  - EGD on 01/25/23: Large amount of food retained, suspect Gastroparesis  - HIDA scan with nonvisualization of the gallbladder suggesting cystic duct obstruction/acute cholecystitis  - General surgery with plans for possible cholecystectomy today versus tomorrow    Essential HTN, controlled. - Continue home losartan  - Telemetry monitoring    DM2, uncontrolled. - Hemoglobin a1c 6.9  - LDSSI  - POCT Q4hrs while NPO  - Hypoglycemia protocol  - Carb control diet when able    Morbid Obesity -  With Body mass index is 48.96 kg/m². Complicating assessment and treatment. Placing patient at risk for multiple co-morbidities as well as early death and contributing to the patient's presentation. Counseled on weight loss      DVT Prophylaxis: lovenox  Diet: Diet NPO Exceptions are: Ice Chips, Sips of Water with Meds  Code Status: Full Code    PT/OT Eval Status: not ordered    Dispo - pending clinical improvement    Jayshree Carrion - NP

## 2023-01-27 ENCOUNTER — ANESTHESIA (OUTPATIENT)
Dept: OPERATING ROOM | Age: 48
End: 2023-01-27
Payer: COMMERCIAL

## 2023-01-27 ENCOUNTER — APPOINTMENT (OUTPATIENT)
Dept: GENERAL RADIOLOGY | Age: 48
DRG: 854 | End: 2023-01-27
Payer: COMMERCIAL

## 2023-01-27 ENCOUNTER — ANESTHESIA EVENT (OUTPATIENT)
Dept: OPERATING ROOM | Age: 48
End: 2023-01-27
Payer: COMMERCIAL

## 2023-01-27 LAB
A/G RATIO: 0.8 (ref 1.1–2.2)
ALBUMIN SERPL-MCNC: 3 G/DL (ref 3.4–5)
ALP BLD-CCNC: 113 U/L (ref 40–129)
ALT SERPL-CCNC: 20 U/L (ref 10–40)
ANION GAP SERPL CALCULATED.3IONS-SCNC: 15 MMOL/L (ref 3–16)
AST SERPL-CCNC: 48 U/L (ref 15–37)
BASOPHILS ABSOLUTE: 0 K/UL (ref 0–0.2)
BASOPHILS RELATIVE PERCENT: 0 %
BILIRUB SERPL-MCNC: 0.6 MG/DL (ref 0–1)
BUN BLDV-MCNC: 7 MG/DL (ref 7–20)
CALCIUM SERPL-MCNC: 8.2 MG/DL (ref 8.3–10.6)
CHLORIDE BLD-SCNC: 101 MMOL/L (ref 99–110)
CO2: 21 MMOL/L (ref 21–32)
CREAT SERPL-MCNC: 0.6 MG/DL (ref 0.6–1.1)
EOSINOPHILS ABSOLUTE: 0 K/UL (ref 0–0.6)
EOSINOPHILS RELATIVE PERCENT: 0 %
GFR SERPL CREATININE-BSD FRML MDRD: >60 ML/MIN/{1.73_M2}
GLUCOSE BLD-MCNC: 151 MG/DL (ref 70–99)
GLUCOSE BLD-MCNC: 169 MG/DL (ref 70–99)
GLUCOSE BLD-MCNC: 170 MG/DL (ref 70–99)
GLUCOSE BLD-MCNC: 175 MG/DL (ref 70–99)
GLUCOSE BLD-MCNC: 235 MG/DL (ref 70–99)
GLUCOSE BLD-MCNC: 238 MG/DL (ref 70–99)
GLUCOSE BLD-MCNC: 278 MG/DL (ref 70–99)
GLUCOSE BLD-MCNC: 282 MG/DL (ref 70–99)
HCG(URINE) PREGNANCY TEST: NEGATIVE
HCT VFR BLD CALC: 35.8 % (ref 36–48)
HEMOGLOBIN: 11.6 G/DL (ref 12–16)
LYMPHOCYTES ABSOLUTE: 0.6 K/UL (ref 1–5.1)
LYMPHOCYTES RELATIVE PERCENT: 3.7 %
MAGNESIUM: 2.3 MG/DL (ref 1.8–2.4)
MCH RBC QN AUTO: 28 PG (ref 26–34)
MCHC RBC AUTO-ENTMCNC: 32.4 G/DL (ref 31–36)
MCV RBC AUTO: 86.6 FL (ref 80–100)
MONOCYTES ABSOLUTE: 0.3 K/UL (ref 0–1.3)
MONOCYTES RELATIVE PERCENT: 2 %
NEUTROPHILS ABSOLUTE: 16 K/UL (ref 1.7–7.7)
NEUTROPHILS RELATIVE PERCENT: 94.3 %
PDW BLD-RTO: 15.9 % (ref 12.4–15.4)
PERFORMED ON: ABNORMAL
PLATELET # BLD: 243 K/UL (ref 135–450)
PMV BLD AUTO: 9 FL (ref 5–10.5)
POTASSIUM SERPL-SCNC: 4.5 MMOL/L (ref 3.5–5.1)
RBC # BLD: 4.13 M/UL (ref 4–5.2)
SODIUM BLD-SCNC: 137 MMOL/L (ref 136–145)
TOTAL PROTEIN: 7 G/DL (ref 6.4–8.2)
WBC # BLD: 16.9 K/UL (ref 4–11)

## 2023-01-27 PROCEDURE — 2709999900 HC NON-CHARGEABLE SUPPLY: Performed by: SURGERY

## 2023-01-27 PROCEDURE — 84703 CHORIONIC GONADOTROPIN ASSAY: CPT

## 2023-01-27 PROCEDURE — 2700000000 HC OXYGEN THERAPY PER DAY

## 2023-01-27 PROCEDURE — BF121ZZ FLUOROSCOPY OF GALLBLADDER USING LOW OSMOLAR CONTRAST: ICD-10-PCS | Performed by: SURGERY

## 2023-01-27 PROCEDURE — 1200000000 HC SEMI PRIVATE

## 2023-01-27 PROCEDURE — 6370000000 HC RX 637 (ALT 250 FOR IP): Performed by: SURGERY

## 2023-01-27 PROCEDURE — 6360000002 HC RX W HCPCS: Performed by: SURGERY

## 2023-01-27 PROCEDURE — 2500000003 HC RX 250 WO HCPCS: Performed by: NURSE ANESTHETIST, CERTIFIED REGISTERED

## 2023-01-27 PROCEDURE — 2500000003 HC RX 250 WO HCPCS: Performed by: NURSE PRACTITIONER

## 2023-01-27 PROCEDURE — 6370000000 HC RX 637 (ALT 250 FOR IP): Performed by: ANESTHESIOLOGY

## 2023-01-27 PROCEDURE — 94761 N-INVAS EAR/PLS OXIMETRY MLT: CPT

## 2023-01-27 PROCEDURE — 2580000003 HC RX 258: Performed by: ANESTHESIOLOGY

## 2023-01-27 PROCEDURE — 85025 COMPLETE CBC W/AUTO DIFF WBC: CPT

## 2023-01-27 PROCEDURE — 88304 TISSUE EXAM BY PATHOLOGIST: CPT

## 2023-01-27 PROCEDURE — 2580000003 HC RX 258: Performed by: SURGERY

## 2023-01-27 PROCEDURE — 0FT44ZZ RESECTION OF GALLBLADDER, PERCUTANEOUS ENDOSCOPIC APPROACH: ICD-10-PCS | Performed by: SURGERY

## 2023-01-27 PROCEDURE — 6360000002 HC RX W HCPCS: Performed by: NURSE PRACTITIONER

## 2023-01-27 PROCEDURE — 2500000003 HC RX 250 WO HCPCS: Performed by: SURGERY

## 2023-01-27 PROCEDURE — 74300 X-RAY BILE DUCTS/PANCREAS: CPT

## 2023-01-27 PROCEDURE — 6360000002 HC RX W HCPCS: Performed by: STUDENT IN AN ORGANIZED HEALTH CARE EDUCATION/TRAINING PROGRAM

## 2023-01-27 PROCEDURE — 2580000003 HC RX 258: Performed by: NURSE PRACTITIONER

## 2023-01-27 PROCEDURE — 6360000002 HC RX W HCPCS: Performed by: NURSE ANESTHETIST, CERTIFIED REGISTERED

## 2023-01-27 PROCEDURE — A4217 STERILE WATER/SALINE, 500 ML: HCPCS | Performed by: SURGERY

## 2023-01-27 PROCEDURE — 2720000010 HC SURG SUPPLY STERILE: Performed by: SURGERY

## 2023-01-27 PROCEDURE — 36415 COLL VENOUS BLD VENIPUNCTURE: CPT

## 2023-01-27 PROCEDURE — 3700000001 HC ADD 15 MINUTES (ANESTHESIA): Performed by: SURGERY

## 2023-01-27 PROCEDURE — 6360000002 HC RX W HCPCS: Performed by: ANESTHESIOLOGY

## 2023-01-27 PROCEDURE — 83735 ASSAY OF MAGNESIUM: CPT

## 2023-01-27 PROCEDURE — 6360000004 HC RX CONTRAST MEDICATION: Performed by: SURGERY

## 2023-01-27 PROCEDURE — 3700000000 HC ANESTHESIA ATTENDED CARE: Performed by: SURGERY

## 2023-01-27 PROCEDURE — 7100000001 HC PACU RECOVERY - ADDTL 15 MIN: Performed by: SURGERY

## 2023-01-27 PROCEDURE — 47563 LAPARO CHOLECYSTECTOMY/GRAPH: CPT | Performed by: SURGERY

## 2023-01-27 PROCEDURE — 3600000004 HC SURGERY LEVEL 4 BASE: Performed by: SURGERY

## 2023-01-27 PROCEDURE — 94640 AIRWAY INHALATION TREATMENT: CPT

## 2023-01-27 PROCEDURE — 7100000000 HC PACU RECOVERY - FIRST 15 MIN: Performed by: SURGERY

## 2023-01-27 PROCEDURE — 80053 COMPREHEN METABOLIC PANEL: CPT

## 2023-01-27 PROCEDURE — 3600000014 HC SURGERY LEVEL 4 ADDTL 15MIN: Performed by: SURGERY

## 2023-01-27 RX ORDER — LIDOCAINE HYDROCHLORIDE 20 MG/ML
INJECTION, SOLUTION EPIDURAL; INFILTRATION; INTRACAUDAL; PERINEURAL PRN
Status: DISCONTINUED | OUTPATIENT
Start: 2023-01-27 | End: 2023-01-27 | Stop reason: SDUPTHER

## 2023-01-27 RX ORDER — SODIUM CHLORIDE 0.9 % (FLUSH) 0.9 %
5-40 SYRINGE (ML) INJECTION EVERY 12 HOURS SCHEDULED
Status: DISCONTINUED | OUTPATIENT
Start: 2023-01-27 | End: 2023-01-27 | Stop reason: HOSPADM

## 2023-01-27 RX ORDER — OXYCODONE HYDROCHLORIDE 5 MG/1
5 TABLET ORAL EVERY 4 HOURS PRN
Status: DISCONTINUED | OUTPATIENT
Start: 2023-01-27 | End: 2023-01-29 | Stop reason: HOSPADM

## 2023-01-27 RX ORDER — OXYCODONE HYDROCHLORIDE 10 MG/1
10 TABLET ORAL EVERY 4 HOURS PRN
Status: DISCONTINUED | OUTPATIENT
Start: 2023-01-27 | End: 2023-01-29 | Stop reason: HOSPADM

## 2023-01-27 RX ORDER — DEXAMETHASONE SODIUM PHOSPHATE 4 MG/ML
INJECTION, SOLUTION INTRA-ARTICULAR; INTRALESIONAL; INTRAMUSCULAR; INTRAVENOUS; SOFT TISSUE PRN
Status: DISCONTINUED | OUTPATIENT
Start: 2023-01-27 | End: 2023-01-27 | Stop reason: SDUPTHER

## 2023-01-27 RX ORDER — ONDANSETRON 2 MG/ML
4 INJECTION INTRAMUSCULAR; INTRAVENOUS
Status: DISCONTINUED | OUTPATIENT
Start: 2023-01-27 | End: 2023-01-27 | Stop reason: HOSPADM

## 2023-01-27 RX ORDER — IPRATROPIUM BROMIDE AND ALBUTEROL SULFATE 2.5; .5 MG/3ML; MG/3ML
1 SOLUTION RESPIRATORY (INHALATION) ONCE
Status: COMPLETED | OUTPATIENT
Start: 2023-01-27 | End: 2023-01-27

## 2023-01-27 RX ORDER — PROPOFOL 10 MG/ML
INJECTION, EMULSION INTRAVENOUS PRN
Status: DISCONTINUED | OUTPATIENT
Start: 2023-01-27 | End: 2023-01-27 | Stop reason: SDUPTHER

## 2023-01-27 RX ORDER — MIDAZOLAM HYDROCHLORIDE 1 MG/ML
INJECTION INTRAMUSCULAR; INTRAVENOUS PRN
Status: DISCONTINUED | OUTPATIENT
Start: 2023-01-27 | End: 2023-01-27 | Stop reason: SDUPTHER

## 2023-01-27 RX ORDER — BUPIVACAINE HYDROCHLORIDE 5 MG/ML
INJECTION, SOLUTION EPIDURAL; INTRACAUDAL
Status: COMPLETED | OUTPATIENT
Start: 2023-01-27 | End: 2023-01-27

## 2023-01-27 RX ORDER — ONDANSETRON 2 MG/ML
INJECTION INTRAMUSCULAR; INTRAVENOUS PRN
Status: DISCONTINUED | OUTPATIENT
Start: 2023-01-27 | End: 2023-01-27 | Stop reason: SDUPTHER

## 2023-01-27 RX ORDER — SODIUM CHLORIDE 0.9 % (FLUSH) 0.9 %
5-40 SYRINGE (ML) INJECTION PRN
Status: DISCONTINUED | OUTPATIENT
Start: 2023-01-27 | End: 2023-01-27 | Stop reason: HOSPADM

## 2023-01-27 RX ORDER — SODIUM CHLORIDE 9 MG/ML
INJECTION, SOLUTION INTRAVENOUS PRN
Status: DISCONTINUED | OUTPATIENT
Start: 2023-01-27 | End: 2023-01-27 | Stop reason: HOSPADM

## 2023-01-27 RX ORDER — MAGNESIUM HYDROXIDE 1200 MG/15ML
LIQUID ORAL CONTINUOUS PRN
Status: COMPLETED | OUTPATIENT
Start: 2023-01-27 | End: 2023-01-27

## 2023-01-27 RX ORDER — METOCLOPRAMIDE HYDROCHLORIDE 5 MG/ML
INJECTION INTRAMUSCULAR; INTRAVENOUS
Status: DISPENSED
Start: 2023-01-27 | End: 2023-01-27

## 2023-01-27 RX ORDER — FENTANYL CITRATE 50 UG/ML
INJECTION, SOLUTION INTRAMUSCULAR; INTRAVENOUS PRN
Status: DISCONTINUED | OUTPATIENT
Start: 2023-01-27 | End: 2023-01-27 | Stop reason: SDUPTHER

## 2023-01-27 RX ORDER — ROCURONIUM BROMIDE 10 MG/ML
INJECTION, SOLUTION INTRAVENOUS PRN
Status: DISCONTINUED | OUTPATIENT
Start: 2023-01-27 | End: 2023-01-27 | Stop reason: SDUPTHER

## 2023-01-27 RX ORDER — DROPERIDOL 2.5 MG/ML
0.62 INJECTION, SOLUTION INTRAMUSCULAR; INTRAVENOUS
Status: DISCONTINUED | OUTPATIENT
Start: 2023-01-27 | End: 2023-01-27 | Stop reason: HOSPADM

## 2023-01-27 RX ORDER — FENTANYL CITRATE 50 UG/ML
25 INJECTION, SOLUTION INTRAMUSCULAR; INTRAVENOUS EVERY 5 MIN PRN
Status: DISCONTINUED | OUTPATIENT
Start: 2023-01-27 | End: 2023-01-27 | Stop reason: HOSPADM

## 2023-01-27 RX ADMIN — METHOCARBAMOL 1000 MG: 100 INJECTION, SOLUTION INTRAMUSCULAR; INTRAVENOUS at 10:43

## 2023-01-27 RX ADMIN — PIPERACILLIN AND TAZOBACTAM 3375 MG: 3; .375 INJECTION, POWDER, LYOPHILIZED, FOR SOLUTION INTRAVENOUS at 04:47

## 2023-01-27 RX ADMIN — HYDROMORPHONE HYDROCHLORIDE 0.5 MG: 1 INJECTION, SOLUTION INTRAMUSCULAR; INTRAVENOUS; SUBCUTANEOUS at 04:45

## 2023-01-27 RX ADMIN — ROCURONIUM BROMIDE 50 MG: 100 INJECTION, SOLUTION INTRAVENOUS at 07:35

## 2023-01-27 RX ADMIN — SUGAMMADEX 200 MG: 100 INJECTION, SOLUTION INTRAVENOUS at 08:34

## 2023-01-27 RX ADMIN — OXYCODONE HYDROCHLORIDE 10 MG: 10 TABLET ORAL at 20:23

## 2023-01-27 RX ADMIN — METRONIDAZOLE 500 MG: 500 INJECTION, SOLUTION INTRAVENOUS at 18:28

## 2023-01-27 RX ADMIN — MIDAZOLAM 1 MG: 1 INJECTION INTRAMUSCULAR; INTRAVENOUS at 07:25

## 2023-01-27 RX ADMIN — PANTOPRAZOLE SODIUM 40 MG: 40 TABLET, DELAYED RELEASE ORAL at 20:18

## 2023-01-27 RX ADMIN — MIDAZOLAM 1 MG: 1 INJECTION INTRAMUSCULAR; INTRAVENOUS at 07:26

## 2023-01-27 RX ADMIN — PIPERACILLIN AND TAZOBACTAM 3375 MG: 3; .375 INJECTION, POWDER, LYOPHILIZED, FOR SOLUTION INTRAVENOUS at 14:23

## 2023-01-27 RX ADMIN — INSULIN LISPRO 1 UNITS: 100 INJECTION, SOLUTION INTRAVENOUS; SUBCUTANEOUS at 12:08

## 2023-01-27 RX ADMIN — Medication 1 CAPSULE: at 12:02

## 2023-01-27 RX ADMIN — ENOXAPARIN SODIUM 30 MG: 100 INJECTION SUBCUTANEOUS at 12:03

## 2023-01-27 RX ADMIN — HYDROMORPHONE HYDROCHLORIDE 0.5 MG: 1 INJECTION, SOLUTION INTRAMUSCULAR; INTRAVENOUS; SUBCUTANEOUS at 09:44

## 2023-01-27 RX ADMIN — MONTELUKAST 10 MG: 10 TABLET, FILM COATED ORAL at 12:03

## 2023-01-27 RX ADMIN — IPRATROPIUM BROMIDE AND ALBUTEROL SULFATE 1 AMPULE: .5; 3 SOLUTION RESPIRATORY (INHALATION) at 09:55

## 2023-01-27 RX ADMIN — HYDROMORPHONE HYDROCHLORIDE 0.5 MG: 1 INJECTION, SOLUTION INTRAMUSCULAR; INTRAVENOUS; SUBCUTANEOUS at 09:35

## 2023-01-27 RX ADMIN — PANTOPRAZOLE SODIUM 40 MG: 40 TABLET, DELAYED RELEASE ORAL at 12:03

## 2023-01-27 RX ADMIN — DEXAMETHASONE SODIUM PHOSPHATE 8 MG: 4 INJECTION, SOLUTION INTRAMUSCULAR; INTRAVENOUS at 07:43

## 2023-01-27 RX ADMIN — HYDROMORPHONE HYDROCHLORIDE 0.5 MG: 1 INJECTION, SOLUTION INTRAMUSCULAR; INTRAVENOUS; SUBCUTANEOUS at 22:37

## 2023-01-27 RX ADMIN — HYDROMORPHONE HYDROCHLORIDE 0.5 MG: 1 INJECTION, SOLUTION INTRAMUSCULAR; INTRAVENOUS; SUBCUTANEOUS at 10:04

## 2023-01-27 RX ADMIN — METRONIDAZOLE 500 MG: 500 INJECTION, SOLUTION INTRAVENOUS at 12:05

## 2023-01-27 RX ADMIN — METRONIDAZOLE 500 MG: 500 INJECTION, SOLUTION INTRAVENOUS at 00:39

## 2023-01-27 RX ADMIN — ONDANSETRON 4 MG: 2 INJECTION INTRAMUSCULAR; INTRAVENOUS at 08:34

## 2023-01-27 RX ADMIN — ENOXAPARIN SODIUM 30 MG: 100 INJECTION SUBCUTANEOUS at 20:18

## 2023-01-27 RX ADMIN — OXYCODONE HYDROCHLORIDE 10 MG: 10 TABLET ORAL at 15:30

## 2023-01-27 RX ADMIN — FLUTICASONE PROPIONATE 2 PUFF: 110 AEROSOL, METERED RESPIRATORY (INHALATION) at 20:46

## 2023-01-27 RX ADMIN — HYDROMORPHONE HYDROCHLORIDE 0.5 MG: 1 INJECTION, SOLUTION INTRAMUSCULAR; INTRAVENOUS; SUBCUTANEOUS at 18:26

## 2023-01-27 RX ADMIN — PROPOFOL 175 MG: 10 INJECTION, EMULSION INTRAVENOUS at 07:35

## 2023-01-27 RX ADMIN — PIPERACILLIN AND TAZOBACTAM 4500 MG: 4; .5 INJECTION, POWDER, LYOPHILIZED, FOR SOLUTION INTRAVENOUS at 20:27

## 2023-01-27 RX ADMIN — HYDROMORPHONE HYDROCHLORIDE 0.5 MG: 1 INJECTION, SOLUTION INTRAMUSCULAR; INTRAVENOUS; SUBCUTANEOUS at 12:03

## 2023-01-27 RX ADMIN — ROCURONIUM BROMIDE 20 MG: 100 INJECTION, SOLUTION INTRAVENOUS at 08:17

## 2023-01-27 RX ADMIN — LIDOCAINE HYDROCHLORIDE 100 MG: 20 INJECTION, SOLUTION EPIDURAL; INFILTRATION; INTRACAUDAL; PERINEURAL at 07:35

## 2023-01-27 RX ADMIN — HYDROMORPHONE HYDROCHLORIDE 0.5 MG: 1 INJECTION, SOLUTION INTRAMUSCULAR; INTRAVENOUS; SUBCUTANEOUS at 09:50

## 2023-01-27 RX ADMIN — INSULIN LISPRO 2 UNITS: 100 INJECTION, SOLUTION INTRAVENOUS; SUBCUTANEOUS at 17:32

## 2023-01-27 RX ADMIN — LOSARTAN POTASSIUM 25 MG: 25 TABLET, FILM COATED ORAL at 12:03

## 2023-01-27 RX ADMIN — PROPOFOL 25 MG: 10 INJECTION, EMULSION INTRAVENOUS at 07:36

## 2023-01-27 RX ADMIN — FENTANYL CITRATE 100 MCG: 50 INJECTION INTRAMUSCULAR; INTRAVENOUS at 07:30

## 2023-01-27 RX ADMIN — INSULIN LISPRO 2 UNITS: 100 INJECTION, SOLUTION INTRAVENOUS; SUBCUTANEOUS at 20:28

## 2023-01-27 ASSESSMENT — PAIN SCALES - GENERAL
PAINLEVEL_OUTOF10: 9
PAINLEVEL_OUTOF10: 8
PAINLEVEL_OUTOF10: 9
PAINLEVEL_OUTOF10: 8
PAINLEVEL_OUTOF10: 8
PAINLEVEL_OUTOF10: 6
PAINLEVEL_OUTOF10: 8
PAINLEVEL_OUTOF10: 9
PAINLEVEL_OUTOF10: 6
PAINLEVEL_OUTOF10: 5
PAINLEVEL_OUTOF10: 0
PAINLEVEL_OUTOF10: 7
PAINLEVEL_OUTOF10: 7
PAINLEVEL_OUTOF10: 8
PAINLEVEL_OUTOF10: 9
PAINLEVEL_OUTOF10: 6

## 2023-01-27 ASSESSMENT — PAIN DESCRIPTION - LOCATION
LOCATION: ABDOMEN

## 2023-01-27 ASSESSMENT — PAIN DESCRIPTION - PAIN TYPE
TYPE: SURGICAL PAIN
TYPE: ACUTE PAIN;SURGICAL PAIN
TYPE: ACUTE PAIN;SURGICAL PAIN
TYPE: SURGICAL PAIN
TYPE: SURGICAL PAIN
TYPE: ACUTE PAIN
TYPE: SURGICAL PAIN

## 2023-01-27 ASSESSMENT — PAIN DESCRIPTION - ONSET
ONSET: ON-GOING

## 2023-01-27 ASSESSMENT — PAIN DESCRIPTION - DESCRIPTORS
DESCRIPTORS: ACHING;DISCOMFORT
DESCRIPTORS: ACHING
DESCRIPTORS: ACHING;DISCOMFORT
DESCRIPTORS: ACHING
DESCRIPTORS: ACHING
DESCRIPTORS: ACHING;DISCOMFORT
DESCRIPTORS: ACHING;DISCOMFORT
DESCRIPTORS: ACHING

## 2023-01-27 ASSESSMENT — PAIN DESCRIPTION - ORIENTATION
ORIENTATION: MID
ORIENTATION: MID
ORIENTATION: RIGHT;MID
ORIENTATION: MID
ORIENTATION: MID
ORIENTATION: RIGHT;MID
ORIENTATION: RIGHT
ORIENTATION: RIGHT
ORIENTATION: MID

## 2023-01-27 ASSESSMENT — PAIN DESCRIPTION - FREQUENCY
FREQUENCY: CONTINUOUS

## 2023-01-27 NOTE — PROGRESS NOTES
Hospitalist Progress Note      PCP: Nicole Allen MD    Date of Admission: 1/24/2023    Chief Complaint:   Chief Complaint   Patient presents with    Abdominal Pain     Stabbing pain in center of stomach starting 0830. Emesis on going since. \"Pain wont go away\"      Hospital Course: The patient is a 41 y.o. female with past medical history as below including diabetes and high blood pressure and high cholesterol who presents to [facility] with acute onset and waxing and waning epigastric abdominal pain that is sharp in nature and has been occurring since this past morning. She had not had pain like this before or more recently. She notes that it was associated with decreased appetite throughout the rest of the day and increasing intermittent episodes of nausea and vomiting. At 1 point she did feel as though the abdominal pain was associate with her having vomiting and then feeling lightheaded to the point of passing out but she did not actually pass out. She is uncertain as to what may be causing this abdominal pain but she does not feel as though she ate anything out of the ordinary to trigger the symptoms and no one in her family has had similar symptoms. She denies any other recent symptoms prior to today's abdominal pain nausea vomiting of fever, chills, dizziness, syncope, dysuria, blood in urine/stool/sputum/vomitus, rashes, diarrhea, chest pain, shortness of breath. 01/25: Patient still having significant abdominal pain. She states that she has history of a sliding hiatal hernia, but states that she has never had pain like this before. Discussed CT abdomen and ultrasound results with the patient. GI is planning for EGD this afternoon. Appreciate their input. 01/26: EGD revealing large amount of food retained in the stomach, suggesting gastroparesis. HIDA scan: Nonvisualization of the gallbladder suggesting cystic duct obstruction/acute cholecystitis.   General surgery is with plan for possible cholecystectomy today versus tomorrow. Patient still febrile on zosyn, WBC up to 19k. Will add Flagyl. Subjective:     Laparoscopic cholecystectomy with cholangiogram with Dr. Tres Rosen today. Noted to have gangrenous changes to the gallbladder and a normal cholangiogram.    Patient complains of some incisional pain during my visit, but states that her abdomen does feel better overall. She is back on regular diet per surgery and is tolerating this well. Medications:  Reviewed    Infusion Medications    sodium chloride 125 mL/hr at 01/27/23 0735    dextrose       Scheduled Medications    metoclopramide        metroNIDAZOLE  500 mg IntraVENous Q8H    lactobacillus  1 capsule Oral Daily with breakfast    piperacillin-tazobactam  3,375 mg IntraVENous Q8H    insulin lispro  0-4 Units SubCUTAneous Q4H    sodium chloride flush  5-40 mL IntraVENous 2 times per day    enoxaparin  30 mg SubCUTAneous BID    fluticasone  2 puff Inhalation Daily    losartan  25 mg Oral Daily    montelukast  10 mg Oral Daily    pantoprazole  40 mg Oral BID     PRN Meds: oxyCODONE **OR** oxyCODONE, sodium chloride flush, sodium chloride, acetaminophen **OR** acetaminophen, albuterol sulfate HFA, glucose, dextrose bolus **OR** dextrose bolus, glucagon (rDNA), dextrose, HYDROmorphone **OR** HYDROmorphone, naloxone, hydrALAZINE      Intake/Output Summary (Last 24 hours) at 1/27/2023 1326  Last data filed at 1/27/2023 1158  Gross per 24 hour   Intake 1550 ml   Output 410 ml   Net 1140 ml       Physical Exam Performed:    /77   Pulse 95   Temp 98.5 °F (36.9 °C) (Oral)   Resp 14   Ht 5' 7\" (1.702 m)   Wt (!) 312 lb 6.3 oz (141.7 kg)   LMP  (Approximate) Comment: \"4 weeks ago\"  SpO2 95%   BMI 48.93 kg/m²     General appearance: No apparent distress, appears stated age and cooperative. HEENT: Pupils equal, round, and reactive to light. Conjunctivae/corneas clear. Neck: Supple, with full range of motion.  No jugular venous distention. Trachea midline. Respiratory:  Normal respiratory effort. Clear to auscultation, bilaterally without Rales/Wheezes/Rhonchi. Cardiovascular: Regular rate and rhythm with normal S1/S2 without murmurs, rubs or gallops. Abdomen: Soft, tender to palpation especially to right upper quadrant, guarding, non-distended with normal bowel sounds. Abdominal incisions clean dry and intact  Musculoskeletal: No clubbing, cyanosis or edema bilaterally. Full range of motion without deformity. Skin: Skin color, texture, turgor normal.  No rashes or lesions. Neurologic:  Neurovascularly intact without any focal sensory/motor deficits. Cranial nerves: II-XII intact, grossly non-focal.  Psychiatric: Alert and oriented, thought content appropriate, normal insight  Capillary Refill: Brisk,< 3 seconds   Peripheral Pulses: +2 palpable, equal bilaterally       Labs:   Recent Labs     01/25/23  0608 01/26/23  0716 01/27/23  1216   WBC 18.6* 19.0* 16.9*   HGB 11.8* 12.1 11.6*   HCT 36.4 38.0 35.8*    233 243     Recent Labs     01/25/23  0608 01/26/23  0716 01/27/23  1216   * 136 137   K 3.3* 3.5 4.5   CL 96* 102 101   CO2 22 23 21   BUN 8 7 7   CREATININE 0.6 0.5* 0.6   CALCIUM 8.2* 8.3 8.2*     Recent Labs     01/25/23  0608 01/26/23  0716 01/27/23  1216   AST 21 12* 48*   ALT 18 12 20   BILITOT 0.6 1.2* 0.6   ALKPHOS 102 99 113     No results for input(s): INR in the last 72 hours. Recent Labs     01/25/23  1213   TROPONINI <0.01       Urinalysis:      Lab Results   Component Value Date/Time    NITRU Negative 01/24/2023 02:41 PM    WBCUA 0 01/24/2023 02:41 PM    BACTERIA None Seen 01/24/2023 02:41 PM    RBCUA 4 01/24/2023 02:41 PM    BLOODU SMALL 01/24/2023 02:41 PM    SPECGRAV 1.022 01/24/2023 02:41 PM    GLUCOSEU Negative 01/24/2023 02:41 PM       Radiology:  FL CHOLANGIOGRAM OR   Final Result   No obstructing distal common duct filling defect.          NM HEPATOBILIARY   Final Result   Nonvisualization of the gallbladder suggesting cystic duct obstruction/acute   cholecystitis.         CT ABDOMEN PELVIS W IV CONTRAST Additional Contrast? None   Final Result   Diffuse hepatic steatosis.      Mildly distended gallbladder with cholelithiasis.         US GALLBLADDER RUQ   Final Result   Cholelithiasis without evidence of cholecystitis.      Increased echogenicity of the liver compatible with diffuse hepatocellular   disease, most commonly due to hepatic steatosis.                 Assessment/Plan:    Active Hospital Problems    Diagnosis     Calculus of gallbladder without cholecystitis without obstruction [K80.20]      Priority: Medium    Sepsis (HCC) [A41.9]      Priority: Medium    Abdominal pain [R10.9]      Priority: Medium    DM2 (diabetes mellitus, type 2) (HCC) [E11.9]      Priority: Medium    Hyperlipidemia LDL goal <100 [E78.5]      Sepsis secondary to gangrenous cholecystitis. POA: tachycardia, tachypnea, leukocytosis, febrile. Status post laparoscopic cholecystectomy with cholangiogram on 1/27/2023 with Dr. Talley  - CT abd/pel: Diffuse hepatic steatosis.  Mildly distended gallbladder with cholelithiasis.  -Right upper quadrant ultrasound with cholelithiasis without evidence of cholecystitis.  Increased echogenicity of the liver compatible with diffuse hepatocellular disease, most commonly due to hepatic steatosis  - IV Zosyn + flagyl  - GI and general surgery consulted  - Received 30ml/kg bolus in ED  - IV pain meds as needed  - EGD on 01/25/23: Large amount of food retained, suspect Gastroparesis  - HIDA scan with nonvisualization of the gallbladder suggesting cystic duct obstruction/acute cholecystitis  - General surgery following    Essential HTN, controlled.  - Continue home losartan  - Telemetry monitoring    DM2, uncontrolled.  - Hemoglobin a1c 6.9  - LDSSI  - POCT Q4hrs while NPO  - Hypoglycemia protocol  - Carb control diet when able    Morbid Obesity -  With Body mass index is  48.93 kg/m². Complicating assessment and treatment. Placing patient at risk for multiple co-morbidities as well as early death and contributing to the patient's presentation. Counseled on weight loss      DVT Prophylaxis: lovenox  Diet: ADULT DIET; Regular  Code Status: Full Code    PT/OT Eval Status: not ordered    Dispo - pending clinical improvement    Jayshree Cha - EFRAÍN

## 2023-01-27 NOTE — PROGRESS NOTES
Took over pt care, pt resting easy. Dressings to abdomen dry and intact x4. Abdomen soft. Waiting to go to room.

## 2023-01-27 NOTE — ANESTHESIA PRE PROCEDURE
Department of Anesthesiology  Preprocedure Note       Name:  Latrice Merino   Age:  41 y.o.  :  1975                                          MRN:  [id number]         Date:  2023      Surgeon: Archie Rojas):  Altaf Cox MD    Procedure: Procedure(s):  LAPAROSCOPIC CHOLECYSTECTOMY WITH CHOLANGIOGRAM, POSSBILE OPEN    Medications prior to admission:   Prior to Admission medications    Medication Sig Start Date End Date Taking? Authorizing Provider   ferrous sulfate (IRON 325) 325 (65 Fe) MG tablet Take 325 mg by mouth in the morning and at bedtime   Yes Historical Provider, MD   losartan (COZAAR) 50 MG tablet TAKE 1 TABLET EVERY        MORNING(NEW DOSAGE) 23   Monae Gonzalez MD   Accu-Chek Softclix Lancets MISC Test twice daily 22   OC Chaudhari CNP   blood glucose test strips (ASCENSIA AUTODISC VI;ONE TOUCH ULTRA TEST VI) strip 1 each by In Vitro route 2 times daily As needed. 22   OC Chaudhari CNP   blood glucose monitor kit and supplies Dispense sufficient amount for indicated testing frequency plus additional to accommodate PRN testing needs. Dispense all needed supplies to include: monitor, strips, lancing device, lancets, control solutions, alcohol swabs.  22   OC Chaudhari CNP   dulaglutide (TRULICITY) 1.5 KK/4.0OW SC injection ADMINISTER 1.5 MG UNDER THE SKIN 1 TIME A WEEK 22   OC Chaudhari CNP   sertraline (ZOLOFT) 50 MG tablet Take one tablet daily 22   OC Chaudhari CNP   montelukast (SINGULAIR) 10 MG tablet Take one tablet daily 22   OC Chaudhari CNP   metFORMIN (GLUCOPHAGE-XR) 500 MG extended release tablet Take 4 tablets daily with breakfast 22   Gage Kulkarni, OC Young CNP   pantoprazole (PROTONIX) 40 MG tablet TAKE 1 TABLET BY MOUTH TWICE DAILY 22   Gage Kulkarni, APRN - CNP   FLOVENT DISKUS 100 MCG/BLIST AEPB inhaler INHALE 2 PUFFS INTO THE LUNGS DAILY  Patient taking differently: Inhale 1 puff into the lungs daily 4/22/22   Maxnahed Houston APRN - CNP   norethindrone-ethinyl estradiol ([address] 7/7/7, 28,) 0.5/0.75/1-35 MG-MCG per tablet Take 1 tablet by mouth daily 4/6/22   Lizz Henry MD   loratadine (CLARITIN) 10 MG tablet Take 10 mg by mouth daily.       Historical Provider, MD       Current medications:    Current Facility-Administered Medications   Medication Dose Route Frequency Provider Last Rate Last Admin    sodium chloride flush 0.9 % injection 5-40 mL  5-40 mL IntraVENous 2 times per day Aki Rapp MD        sodium chloride flush 0.9 % injection 5-40 mL  5-40 mL IntraVENous PRN Aki Rapp MD        0.9 % sodium chloride infusion   IntraVENous PRN Aki Rapp MD        metoclopramide (REGLAN) 5 MG/ML injection             metronidazole (FLAGYL) 500 mg in 0.9% NaCl 100 mL IVPB premix  500 mg IntraVENous Q8H Carlton Form, APRN - NP   Stopped at 01/27/23 0140    lactobacillus (CULTURELLE) capsule 1 capsule  1 capsule Oral Daily with breakfast Carlton Form, APRN - NP   1 capsule at 01/26/23 1303    piperacillin-tazobactam (ZOSYN) 3,375 mg in sodium chloride 0.9 % 50 mL IVPB (mini-bag)  3,375 mg IntraVENous Q8H Carlton Form, APRN - NP 12.5 mL/hr at 01/27/23 0447 3,375 mg at 01/27/23 0447    insulin lispro (HUMALOG) injection vial 0-4 Units  0-4 Units SubCUTAneous Q4H Carlton Form, APRN - NP   1 Units at 01/26/23 2026    sodium chloride flush 0.9 % injection 5-40 mL  5-40 mL IntraVENous 2 times per day Tristin M Urmila, DO   10 mL at 01/25/23 2059    sodium chloride flush 0.9 % injection 5-40 mL  5-40 mL IntraVENous PRN Tristin PARKER Kearns, DO        0.9 % sodium chloride infusion   IntraVENous PRN Tristin PARKER Kearns,  mL/hr at 01/26/23 0551 New Bag at 01/26/23 0551    enoxaparin Sodium (LOVENOX) injection 30 mg  30 mg SubCUTAneous BID Tristin PARKER Kearns DO   30 mg at 01/26/23 2020    acetaminophen (TYLENOL) tablet 650 mg  650 mg Oral Q6H PRN Tristin Rutha Plan, DO   650 mg at 01/26/23 2019    Or    acetaminophen (TYLENOL) suppository 650 mg  650 mg Rectal Q6H PRN [facility]        albuterol sulfate HFA (PROVENTIL;VENTOLIN;PROAIR) 108 (90 Base) MCG/ACT inhaler 2 puff  2 puff Inhalation Q4H PRN [facility]        fluticasone (FLOVENT HFA) 110 MCG/ACT inhaler 2 puff  2 puff Inhalation Daily [facility], DO   2 puff at 01/26/23 0733    losartan (COZAAR) tablet 25 mg  25 mg Oral Daily [facility], DO   25 mg at 01/26/23 1304    montelukast (SINGULAIR) tablet 10 mg  10 mg Oral Daily [facility], DO   10 mg at 01/26/23 1304    pantoprazole (PROTONIX) tablet 40 mg  40 mg Oral BID [facility], DO   40 mg at 01/26/23 2020    glucose chewable tablet 16 g  4 tablet Oral PRN [facility], DO        dextrose bolus 10% 125 mL  125 mL IntraVENous PRN [facility], DO        Or    dextrose bolus 10% 250 mL  250 mL IntraVENous PRN [facility], DO        glucagon (rDNA) injection 1 mg  1 mg SubCUTAneous PRN [facility], DO        dextrose 10 % infusion   IntraVENous Continuous PRN [facility], DO        HYDROmorphone (DILAUDID) injection 0.25 mg  0.25 mg IntraVENous Q4H PRN [facility]        Or    HYDROmorphone (DILAUDID) injection 0.5 mg  0.5 mg IntraVENous Q4H PRN Tristin [facility], DO   0.5 mg at 01/27/23 0445    naloxone (NARCAN) injection 0.4 mg  0.4 mg IntraVENous PRN [facility]        hydrALAZINE (APRESOLINE) injection 5 mg  5 mg IntraVENous Q4H PRN Tristin Rutha Plan, DO           Allergies:     Allergies   Allergen Reactions    Fluoxetine Other (See Comments)     Overtim, head ache    Lisinopril Other (See Comments)     cough    Oseltamivir Phosphate Other (See Comments)    Lexapro [Escitalopram Oxalate] Other (See Comments)     sleepy    Methylisothiazolinone Itching and Rash    Sulfa Antibiotics Rash       Problem List:    Patient Active Problem List   Diagnosis Code    Seborrhea L21.9    Dysfunctional uterine bleeding N93.8    Candidal intertrigo B37.2    Urticaria, idiopathic L50.1    Low back pain M54.50    Allergic rhinitis J30.9    Asthma- mild persistent J45.909    GERD (gastroesophageal reflux disease) K21.9    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    Hiatal hernia K44.9    Obesity, Class III, BMI 40-49.9 (morbid obesity) ([facility]) E66.01    Obstructive apnea- CPAP G47.33    Hyperlipidemia LDL goal <100 E78.5    Type 2 diabetes mellitus with diabetic nephropathy ([facility]) E11.21    Irritable bowel syndrome with diarrhea K58.0    History of colon polyps Z86.010    Anxiety F41.9    Microcytic anemia D50.9    RLS (restless legs syndrome) G25.81    Hypertension, essential I10    Sepsis ([facility]) A41.9    Abdominal pain R10.9    DM2 (diabetes mellitus, type 2) ([facility]) E11.9    Calculus of gallbladder without cholecystitis without obstruction K80.20       Past Medical History:        Diagnosis Date    Abnormal Pap smear- LGSIL 2001 5/4/2011 2001, nl since    Cholinergic urticaria 60/9/3743    Chronic folliculitis 3/31/6545    Colon polyp 5/2/2018    Dysfunctional uterine bleeding     False positive serology for HIV 7/30/2017    H/O colonoscopy- done 4/12/2018 polyp, repeat 5 years 4/13/2018    Hypertension, essential 8/10/2022    Obstructive apnea     Seborrhea     Urticaria, idiopathic        Past Surgical History:        Procedure Laterality Date    ANKLE SURGERY Left     ENDOSCOPY, COLON, DIAGNOSTIC      MOUTH SURGERY  2016    gingival auto graft    UPPER GASTROINTESTINAL ENDOSCOPY N/A 1/25/2023    ESOPHAGOGASTRODUODENOSCOPY performed by Alison Rodriguez MD at [address] [place] [address] History:    Social History     Tobacco Use    Smoking status: Never    Smokeless tobacco: Never    Tobacco comments:     advised not to start   Substance Use Topics    Alcohol use:  Yes     Alcohol/week: 1.0 standard drink     Types: 1 Cans of beer per week Counseling given: Not Answered  Tobacco comments: advised not to start      Vital Signs (Current):   Vitals:    01/27/23 0007 01/27/23 0438 01/27/23 0440 01/27/23 0629   BP: 106/69 116/62  128/78   Pulse: (!) 120 (!) 103  (!) 103   Resp: 16 18  18   Temp: 98.8 °F (37.1 °C) 99.4 °F (37.4 °C)  (!) 96.4 °F (35.8 °C)   TempSrc: Oral Oral  Temporal   SpO2: 95% 95%  96%   Weight:   (!) 312 lb 6.3 oz (141.7 kg)    Height:                                                  BP Readings from Last 3 Encounters:   01/27/23 128/78   11/30/22 124/86   08/10/22 (!) 140/88       NPO Status: Time of last liquid consumption: 2200                        Time of last solid consumption: 0800                        Date of last liquid consumption: 01/26/23                        Date of last solid food consumption: 01/24/23    BMI:   Wt Readings from Last 3 Encounters:   01/27/23 (!) 312 lb 6.3 oz (141.7 kg)   11/30/22 (!) 313 lb (142 kg)   08/10/22 (!) 310 lb (140.6 kg)     Body mass index is 48.93 kg/m².     CBC:   Lab Results   Component Value Date/Time    WBC 19.0 01/26/2023 07:16 AM    RBC 4.37 01/26/2023 07:16 AM    HGB 12.1 01/26/2023 07:16 AM    HCT 38.0 01/26/2023 07:16 AM    MCV 86.9 01/26/2023 07:16 AM    RDW 15.8 01/26/2023 07:16 AM     01/26/2023 07:16 AM       CMP:   Lab Results   Component Value Date/Time     01/26/2023 07:16 AM    K 3.5 01/26/2023 07:16 AM    K 4.3 01/24/2023 01:39 PM     01/26/2023 07:16 AM    CO2 23 01/26/2023 07:16 AM    BUN 7 01/26/2023 07:16 AM    CREATININE 0.5 01/26/2023 07:16 AM    GFRAA >60 09/23/2022 10:47 AM    AGRATIO 1.0 01/26/2023 07:16 AM    LABGLOM >60 01/26/2023 07:16 AM    GLUCOSE 178 01/26/2023 07:16 AM    PROT 6.3 01/26/2023 07:16 AM    CALCIUM 8.3 01/26/2023 07:16 AM    BILITOT 1.2 01/26/2023 07:16 AM    ALKPHOS 99 01/26/2023 07:16 AM    AST 12 01/26/2023 07:16 AM    ALT 12 01/26/2023 07:16 AM       POC Tests:   Recent Labs     01/27/23  0630 POCGLU 170*       Coags: No results found for: PROTIME, INR, APTT    HCG (If Applicable):   Lab Results   Component Value Date    PREGTESTUR Negative 01/27/2023        ABGs: No results found for: PHART, PO2ART, CKI8CYF, NYQ5CXK, BEART, O0WFLAVA     Type & Screen (If Applicable):  No results found for: LABABO, LABRH    Drug/Infectious Status (If Applicable):  No results found for: HIV, HEPCAB    COVID-19 Screening (If Applicable):   Lab Results   Component Value Date/Time    COVID19 Not Detected 07/03/2020 10:53 AM           Anesthesia Evaluation  Patient summary reviewed no history of anesthetic complications:   Airway: Mallampati: II  TM distance: >3 FB   Neck ROM: full  Mouth opening: > = 3 FB   Dental: normal exam         Pulmonary:normal exam    (+) sleep apnea: on CPAP,  asthma:                            Cardiovascular:  Exercise tolerance: good (>4 METS),   (+) hypertension:,     (-) past MI, CAD and  ALMANZA      Rhythm: regular  Rate: normal                    Neuro/Psych:   Negative Neuro/Psych ROS              GI/Hepatic/Renal:   (+) hiatal hernia, GERD:, morbid obesity     (-) liver disease, no renal disease and bowel prep       Endo/Other:    (+) DiabetesType II DM, , blood dyscrasia: anemia:., electrolyte abnormalities (hypokalemia), . Abdominal:   (+) obese,           Vascular: negative vascular ROS. Other Findings:             Anesthesia Plan      general     ASA 3     (39 yo F with morbid obesity, DM2, HTN, SARA, hiatal hernia, GERD presenting for lap sam.     I discussed with the patient the risks and benefits to general anesthesia including possible anesthetic complications but not limited to: PONV, damage to the airway and surrounding structures (teeth, lips, gums, tongue, etc.), adverse reactions to medications, cardiac complications (MI, CHF, arrhythmias, etc.), respiratory complications (post-op ventilation, respiratory failure, etc.), neurologic complications (nerve damage, stroke, seizure) and death. The patient was given the opportunity to ask questions and all questions were answered to the patient's satisfaction.    )  Induction: intravenous. MIPS: Postoperative opioids intended and Prophylactic antiemetics administered. Anesthetic plan and risks discussed with patient. Plan discussed with CRNA. This pre-anesthesia assessment may be used as a history and physical.    DOS STAFF ADDENDUM:    Pt seen and examined, chart reviewed (including anesthesia, drug and allergy history). No interval changes to history and physical examination. Anesthetic plan, risks, benefits, alternatives, and personnel involved discussed with patient. Patient verbalized an understanding and agrees to proceed.       Ana Blue MD  January 27, 2023  6:47 AM

## 2023-01-27 NOTE — ANESTHESIA POSTPROCEDURE EVALUATION
Department of Anesthesiology  Postprocedure Note    Patient: Latrice Merino  MRN: [id number]  YOB: 1975  Date of evaluation: 1/27/2023      Procedure Summary     Date: 01/27/23 Room / Location: [address]    Anesthesia Start: 0401 Anesthesia Stop: 2756    Procedure: LAPAROSCOPIC CHOLECYSTECTOMY WITH CHOLANGIOGRAM, POSSBILE OPEN (Abdomen) Diagnosis:       Cholecystitis      (CHOLECYSTITIS)    Surgeons: Altaf Cox MD Responsible Provider: Rupinder Hamilton MD    Anesthesia Type: general ASA Status: 3          Anesthesia Type: No value filed. Mark Phase I: Mark Score: 10    Mark Phase II:        Anesthesia Post Evaluation    Patient location during evaluation: PACU  Patient participation: complete - patient participated  Level of consciousness: awake  Airway patency: patent  Nausea & Vomiting: no nausea and no vomiting  Cardiovascular status: blood pressure returned to baseline  Respiratory status: acceptable  Hydration status: stable  Comments: Vital signs stable  OK to discharge from Stage I post anesthesia care.   Care transferred from Anesthesiology department on discharge from perioperative area   Multimodal analgesia pain management approach

## 2023-01-27 NOTE — PROGRESS NOTES
Per [facility] patient had 9 beats of PAT with a HR of 152 bpm. Checked on patient , not on distress. EFRAÍN [place] made aware.  Electronically signed by Bk Pineda RN on 1/26/2023 at 10:39 PM

## 2023-01-27 NOTE — PROGRESS NOTES
INPATIENT PROGRESS NOTE        IDENTIFYING DATA/REASON FOR CONSULTATION   PATIENT:  Minal Hoskins  MRN:  [id number]  ADMIT DATE: 1/24/2023  TIME OF EVALUATION: 1/27/2023 11:23 AM  HOSPITAL STAY:   LOS: 3 days   CONSULTING PHYSICIAN: No Vera MD   REASON FOR CONSULTATION: abdominal pain    Subjective:    Patient seen in follow up     Underwent cholecystectomy this morning     She has no complaints.   Abd sore at surgical incision sites    MEDICATIONS   SCHEDULED:  metoclopramide, ,   metroNIDAZOLE, 500 mg, Q8H  lactobacillus, 1 capsule, Daily with breakfast  piperacillin-tazobactam, 3,375 mg, Q8H  insulin lispro, 0-4 Units, Q4H  sodium chloride flush, 5-40 mL, 2 times per day  enoxaparin, 30 mg, BID  fluticasone, 2 puff, Daily  losartan, 25 mg, Daily  montelukast, 10 mg, Daily  pantoprazole, 40 mg, BID    FLUIDS/DRIPS:     sodium chloride 125 mL/hr at 01/27/23 0735    dextrose       PRNs: oxyCODONE, 5 mg, Q4H PRN   Or  oxyCODONE, 10 mg, Q4H PRN  sodium chloride flush, 5-40 mL, PRN  sodium chloride, , PRN  acetaminophen, 650 mg, Q6H PRN   Or  acetaminophen, 650 mg, Q6H PRN  albuterol sulfate HFA, 2 puff, Q4H PRN  glucose, 4 tablet, PRN  dextrose bolus, 125 mL, PRN   Or  dextrose bolus, 250 mL, PRN  glucagon (rDNA), 1 mg, PRN  dextrose, , Continuous PRN  HYDROmorphone, 0.25 mg, Q4H PRN   Or  HYDROmorphone, 0.5 mg, Q4H PRN  naloxone, 0.4 mg, PRN  hydrALAZINE, 5 mg, Q4H PRN    ALLERGIES:    Allergies   Allergen Reactions    Fluoxetine Other (See Comments)     Overtim, head ache    Lisinopril Other (See Comments)     cough    Oseltamivir Phosphate Other (See Comments)    Lexapro [Escitalopram Oxalate] Other (See Comments)     sleepy    Methylisothiazolinone Itching and Rash    Sulfa Antibiotics Rash         PHYSICAL EXAM   VITALS:  /82   Pulse 96   Temp 97.4 °F (36.3 °C)   Resp 15   Ht 5' 7\" (1.702 m)   Wt (!) 312 lb 6.3 oz (141.7 kg)   LMP  (Approximate) Comment: \"4 weeks ago\"  SpO2 95%   BMI 48.93 kg/m²   TEMPERATURE:  Current - Temp: 97.4 °F (36.3 °C); Max - Temp  Av.3 °F (36.8 °C)  Min: 96.4 °F (35.8 °C)  Max: 100.7 °F (38.2 °C)    Physical Exam:  General appearance: alert, cooperative, no distress, appears stated age  Eyes: Anicteric  Head: Normocephalic, without obvious abnormality  Lungs: clear to auscultation bilaterally, Normal Effort  Heart: regular rate and rhythm, normal S1 and S2, no murmurs or rubs  Abdomen: soft, non-distended, non-tender. Bowel sounds normal.   Extremities: atraumatic, no cyanosis or edema  Skin: warm and dry, no jaundice  Neuro: Grossly intact, A&OX3    LABS AND IMAGING   Laboratory   Recent Labs     23  1339 23  0608 23  0716   WBC 14.7* 18.6* 19.0*   HGB 13.7 11.8* 12.1   HCT 42.0 36.4 38.0   MCV 86.3 85.5 86.9    245 233     Recent Labs     23  1339 23  0608 23  0716    133* 136   K 4.3 3.3* 3.5   CL 98* 96* 102   CO2 18* 22 23   BUN 17 8 7   CREATININE 0.6 0.6 0.5*     Recent Labs     23  1339 23  0608 23  0716   AST 27 21 12*   ALT 20 18 12   BILITOT 0.3 0.6 1.2*   ALKPHOS 130* 102 99     Recent Labs     23  1339   LIPASE 42.0     No results for input(s): PROTIME, INR in the last 72 hours. Imaging  FL CHOLANGIOGRAM OR   Final Result   No obstructing distal common duct filling defect. NM HEPATOBILIARY   Final Result   Nonvisualization of the gallbladder suggesting cystic duct obstruction/acute   cholecystitis. CT ABDOMEN PELVIS W IV CONTRAST Additional Contrast? None   Final Result   Diffuse hepatic steatosis. Mildly distended gallbladder with cholelithiasis. US GALLBLADDER RUQ   Final Result   Cholelithiasis without evidence of cholecystitis. Increased echogenicity of the liver compatible with diffuse hepatocellular   disease, most commonly due to hepatic steatosis.              Endoscopy      ASSESSMENT AND RECOMMENDATIONS   Brian Rios is a 41 y.o. female with PMH of HTN, SARA, obesity who presented on 1/24/2023 with abdominal pain. CT showed mildly distended gallbladder with cholelithiasis o/w normal.  RUQ US showed no evidence of acute cholecystitis. EGD completed 1/25 that showed large amt of retained food in the stomach suggestive of gastroparesis    Abdominal pain. Differentials include acute gastroenteritis, gallbladder disease, med induced (?trulicity), functional dyspepsia. HIDA scan nonvisualization of the gallbladder suggesting cystic duct obstruction/acute cholecystitis. Underwent cholecystectomy this morning    Suspected gastroparesis. EGD with large amt of retained food in stomach. May consider trial of Reglan if symptoms persist and gallbladder work up unrevealing    RECOMMENDATIONS:    Diet adv per Gen Surgery  Continue PPI    If you have any questions or need any further information, please feel free to contact us [phone]. Thank you for allowing us to participate in the care of Janki Huber. The note was completed using Dragon voice recognition transcription. Every effort was made to ensure accuracy; however, inadvertent transcription errors may be present despite my best efforts to edit errors. Kailee ROMERO    Attending physician addendum:    I have personally seen and examined the patient, reviewed the patient's medical record and pertinent labs and clinical imaging. I have personally staffed the case with Kailee ROMERO. I agree with her consultation note, exam findings, assessment and plans  as written above. I have made appropriate modifications and edited her assessment and plan where needed to reflect my impression and plans for this patient. Janki Huber is a 40 YO female with a PMH of HTN, SARA, obesity who presented on 1/24/2023 with abdominal pain. We have been consulted regarding abdominal pain.  Acute onset of abdominal pain starting yesterday AM. EGD with retained food suggestive of Gastroparesis which may be secondary to Trulicity but this is not a new medication. HIDA positive. Planning of cholecystectomy today. We discussed this may not fix her pain but given otherwise negative work-up and HIDA results will proceed with cholecystectomy. Thank you for allowing me to participate in this patient's care. If there are any questions or concerns regarding this patient, or the plan we have set in place, please feel free to contact me at [phone].      Flores Ceballos MD

## 2023-01-27 NOTE — BRIEF OP NOTE
Brief Postoperative Note      Patient: Joseph Brennan  YOB: 1975  MRN: [id number]    Date of Procedure: 1/27/2023    Pre-Op Diagnosis: CHOLECYSTITIS    Post-Op Diagnosis:  gangrenous cholecystitis       Procedure(s):  LAPAROSCOPIC CHOLECYSTECTOMY WITH CHOLANGIOGRAM, POSSBILE OPEN    Surgeon(s):  Dal Cabot, MD    Assistant:  Surgical Assistant: Rosey Berrios RN    Anesthesia: General    Estimated Blood Loss (mL): less than 50     Complications: None    Specimens:   ID Type Source Tests Collected by Time Destination   A : A. gallbladder and contents Tissue Gallbladder SURGICAL PATHOLOGY Dal Cabot, MD 1/27/2023 1474        Implants:  * No implants in log *      Drains: * No LDAs found *    Findings: gangrenous change to gallbladder, normal cholangiogram    Electronically signed by Yesika Mejia MD on 1/27/2023 at 8:40 AM

## 2023-01-27 NOTE — H&P
Preoperative History and Physical Update    H&P from 1/24/2023 was reviewed    Ongoing epigastric pain  Hida scan positive for cholecystitis    PE  Alert and oriented  Tender epigastrium    A/P  Acute cholecystitis  For Laparoscopic Cholecystectomy with Cholangiogram    The risks, benefits and alternatives to the planned procedure were discussed. Patient expressed an understanding and is willing to proceed.     Electronically signed by Lynn Oconnor MD on 1/27/2023 at 7:25 AM

## 2023-01-27 NOTE — OP NOTE
[address] [place] [address]                                OPERATIVE REPORT    PATIENT NAME: Iris Alexandra                      :        1975  MED REC NO:   [id number]                          ROOM:       4255  ACCOUNT NO:   [de-identified]                           ADMIT DATE: 2023  PROVIDER:     Naya Neely MD    DATE OF PROCEDURE:  2023    PREOPERATIVE DIAGNOSIS:  Cholecystitis. POSTOPERATIVE DIAGNOSIS:  Gangrenous cholecystitis. OPERATION PERFORMED:  Laparoscopic cholecystectomy with cholangiogram.    SURGEON:  Naya Neely MD    SPECIMEN:  Gallbladder. ESTIMATED BLOOD LOSS:  Less than 50 mL. COMPLICATIONS:  None. DISPOSITION:  To Recovery in stable condition. INDICATION:  The patient is a 51-year-old female who presented with  abdominal pain. Her initial imaging was suggestive of possible  gallbladder issues and she ultimately underwent a HIDA scan which showed  nonvisualization consistent with cholecystitis. The risks, benefits,  alternatives of the procedure were reviewed and she agreed to proceed  with cholecystectomy today. OPERATIVE PROCEDURE:  The patient was brought to the operating room,  placed supine, general anesthesia intubation performed and the abdomen  prepped and draped in a sterile fashion. A supraumbilical incision was  made and a trocar placed with the Eladio technique. Insufflation was  established and three additional trocars placed across the right upper  quadrant. There were dense omental adhesions into the right upper  quadrant to the gallbladder and surrounding liver bed. Gentle blunt  dissection was used and eventually the gallbladder was exposed. There  was purple to black discoloration of the gallbladder consistent with  gangrenous cholecystitis but we were able to open and decompress the  gallbladder.   This allowed us to retract cephalad and additional  dissection was performed to free remaining adhesions from the duodenum  and omentum off the surface of the gallbladder. Eventually, we  identified the neck of the gallbladder and dissecting down the lateral  surface, we eventually identified the cystic duct. Dissection was  carried in the medial direction and the duct and the artery were  identified and cleared. A clip was placed on the gallbladder side of  the cystic duct and the duct opened with the scissors. A cholangiogram  catheter was then inserted and a cholangiogram performed. This showed  normal biliary anatomy with free flow of contrast to the duodenum and no  filling defects. The catheter was withdrawn. Clips were placed on the  downside of the cystic duct x3 and the duct divided. The cystic artery  was now clipped and divided and the gallbladder  from the liver  bed using electrocautery. It was placed into an Endopouch and extracted  from the umbilical trocar site. The liver bed was now checked for  bleeding, hemostasis achieved with cautery and the abdomen irrigated and  suctioned clear. The trocars were now withdrawn and the umbilicus  closed with a 0 Vicryl in the fascia. Skin incisions were closed with  4-0 Vicryls. Dressings applied and the patient transferred to Recovery  in good condition.         Dora Romero MD    D: 01/27/2023 10:51:46       T: 01/27/2023 10:55:36     [id number]  Job#: [id number]     Doc#: [id number]    CC:

## 2023-01-27 NOTE — PROGRESS NOTES
Patient admitted to PACU # 5 from OR at 0855 post LAPAROSCOPIC CHOLECYSTECTOMY WITH CHOLANGIOGRAM, GIACOOM [place].  Attached to PACU monitoring system and report received from anesthesia provider.  Patient was reported to be hemodynamically stable during procedure.  Patient drowsy on admission, airway remains in place. Dressings are clean, dry, and intact.

## 2023-01-27 NOTE — PROGRESS NOTES
Report called to floor rn. Pt to floor in stable condition resting easy. Surgical site remains unchanged.

## 2023-01-27 NOTE — PLAN OF CARE
Problem: Pain  Goal: Verbalizes/displays adequate comfort level or baseline comfort level  1/26/2023 2113 by Nishant Crespo RN  Outcome: Progressing  1/26/2023 1712 by Juvenal Floyd RN  Outcome: Progressing    Pain/discomfort being managed with PRN analgesics per MD orders. Pt able to express presence and absence of pain and rate pain appropriately using numerical scale. Non-pharmacologic comfort measures implemented. Rest and comfort promoted.       Problem: ABCDS Injury Assessment  Goal: Absence of physical injury  1/26/2023 2113 by Nishant Crespo RN  Outcome: Progressing  1/26/2023 1712 by Juvenal Floyd RN  Outcome: Progressing     Problem: Gastrointestinal - Adult  Goal: Minimal or absence of nausea and vomiting  1/26/2023 2113 by Nishant Crespo RN  Outcome: Progressing  1/26/2023 1712 by Juvenal Floyd RN  Outcome: Progressing     Problem: Gastrointestinal - Adult  Goal: Maintains or returns to baseline bowel function  1/26/2023 2113 by Nishant Crespo RN  Outcome: Progressing  1/26/2023 1712 by Juvenal Floyd RN  Outcome: Progressing     Problem: Gastrointestinal - Adult  Goal: Maintains adequate nutritional intake  1/26/2023 2113 by Nishant Crespo RN  Outcome: Progressing  1/26/2023 1712 by Juvenal Floyd RN  Outcome: Progressing     Problem: Metabolic/Fluid and Electrolytes - Adult  Goal: Electrolytes maintained within normal limits  1/26/2023 2113 by Nishant Crespo RN  Outcome: Progressing  1/26/2023 1712 by Juvenal Floyd RN  Outcome: Progressing     Problem: Metabolic/Fluid and Electrolytes - Adult  Goal: Hemodynamic stability and optimal renal function maintained  1/26/2023 2113 by Nishant Crespo RN  Outcome: Progressing  1/26/2023 1712 by Juvenal Floyd RN  Outcome: Progressing     Problem: Discharge Planning  Goal: Discharge to home or other facility with appropriate resources  1/26/2023 2113 by Nishant Crespo RN  Outcome: Progressing  1/26/2023 1712 by Juvenal Floyd RN  Outcome: Progressing

## 2023-01-28 LAB
A/G RATIO: 0.8 (ref 1.1–2.2)
ALBUMIN SERPL-MCNC: 2.8 G/DL (ref 3.4–5)
ALP BLD-CCNC: 103 U/L (ref 40–129)
ALT SERPL-CCNC: 14 U/L (ref 10–40)
ANION GAP SERPL CALCULATED.3IONS-SCNC: 14 MMOL/L (ref 3–16)
AST SERPL-CCNC: 13 U/L (ref 15–37)
BASOPHILS ABSOLUTE: 0 K/UL (ref 0–0.2)
BASOPHILS RELATIVE PERCENT: 0.2 %
BILIRUB SERPL-MCNC: 0.5 MG/DL (ref 0–1)
BLOOD CULTURE, ROUTINE: NORMAL
BUN BLDV-MCNC: 8 MG/DL (ref 7–20)
CALCIUM SERPL-MCNC: 8.3 MG/DL (ref 8.3–10.6)
CHLORIDE BLD-SCNC: 101 MMOL/L (ref 99–110)
CO2: 21 MMOL/L (ref 21–32)
CREAT SERPL-MCNC: 0.6 MG/DL (ref 0.6–1.1)
CULTURE, BLOOD 2: NORMAL
EOSINOPHILS ABSOLUTE: 0.1 K/UL (ref 0–0.6)
EOSINOPHILS RELATIVE PERCENT: 0.4 %
GFR SERPL CREATININE-BSD FRML MDRD: >60 ML/MIN/{1.73_M2}
GLUCOSE BLD-MCNC: 173 MG/DL (ref 70–99)
GLUCOSE BLD-MCNC: 174 MG/DL (ref 70–99)
GLUCOSE BLD-MCNC: 183 MG/DL (ref 70–99)
GLUCOSE BLD-MCNC: 188 MG/DL (ref 70–99)
GLUCOSE BLD-MCNC: 195 MG/DL (ref 70–99)
GLUCOSE BLD-MCNC: 212 MG/DL (ref 70–99)
GLUCOSE BLD-MCNC: 213 MG/DL (ref 70–99)
HCT VFR BLD CALC: 35 % (ref 36–48)
HEMOGLOBIN: 11.1 G/DL (ref 12–16)
LYMPHOCYTES ABSOLUTE: 2.8 K/UL (ref 1–5.1)
LYMPHOCYTES RELATIVE PERCENT: 18.5 %
MAGNESIUM: 2.3 MG/DL (ref 1.8–2.4)
MCH RBC QN AUTO: 27.7 PG (ref 26–34)
MCHC RBC AUTO-ENTMCNC: 31.6 G/DL (ref 31–36)
MCV RBC AUTO: 87.6 FL (ref 80–100)
MONOCYTES ABSOLUTE: 1 K/UL (ref 0–1.3)
MONOCYTES RELATIVE PERCENT: 6.8 %
NEUTROPHILS ABSOLUTE: 11.1 K/UL (ref 1.7–7.7)
NEUTROPHILS RELATIVE PERCENT: 74.1 %
PDW BLD-RTO: 16 % (ref 12.4–15.4)
PERFORMED ON: ABNORMAL
PLATELET # BLD: 260 K/UL (ref 135–450)
PMV BLD AUTO: 9.2 FL (ref 5–10.5)
POTASSIUM SERPL-SCNC: 3.3 MMOL/L (ref 3.5–5.1)
RBC # BLD: 3.99 M/UL (ref 4–5.2)
SODIUM BLD-SCNC: 136 MMOL/L (ref 136–145)
TOTAL PROTEIN: 6.4 G/DL (ref 6.4–8.2)
WBC # BLD: 14.9 K/UL (ref 4–11)

## 2023-01-28 PROCEDURE — 85025 COMPLETE CBC W/AUTO DIFF WBC: CPT

## 2023-01-28 PROCEDURE — 6370000000 HC RX 637 (ALT 250 FOR IP): Performed by: NURSE PRACTITIONER

## 2023-01-28 PROCEDURE — 2500000003 HC RX 250 WO HCPCS: Performed by: SURGERY

## 2023-01-28 PROCEDURE — 6370000000 HC RX 637 (ALT 250 FOR IP): Performed by: SURGERY

## 2023-01-28 PROCEDURE — 80053 COMPREHEN METABOLIC PANEL: CPT

## 2023-01-28 PROCEDURE — 36415 COLL VENOUS BLD VENIPUNCTURE: CPT

## 2023-01-28 PROCEDURE — 1200000000 HC SEMI PRIVATE

## 2023-01-28 PROCEDURE — 2580000003 HC RX 258: Performed by: NURSE PRACTITIONER

## 2023-01-28 PROCEDURE — 99024 POSTOP FOLLOW-UP VISIT: CPT | Performed by: SURGERY

## 2023-01-28 PROCEDURE — 94760 N-INVAS EAR/PLS OXIMETRY 1: CPT

## 2023-01-28 PROCEDURE — 6360000002 HC RX W HCPCS: Performed by: SURGERY

## 2023-01-28 PROCEDURE — 6360000002 HC RX W HCPCS: Performed by: NURSE PRACTITIONER

## 2023-01-28 PROCEDURE — 83735 ASSAY OF MAGNESIUM: CPT

## 2023-01-28 PROCEDURE — 94640 AIRWAY INHALATION TREATMENT: CPT

## 2023-01-28 RX ORDER — IBUPROFEN 600 MG/1
600 TABLET ORAL EVERY 8 HOURS
Status: DISCONTINUED | OUTPATIENT
Start: 2023-01-28 | End: 2023-01-29 | Stop reason: HOSPADM

## 2023-01-28 RX ORDER — POTASSIUM CHLORIDE 20 MEQ/1
40 TABLET, EXTENDED RELEASE ORAL PRN
Status: DISCONTINUED | OUTPATIENT
Start: 2023-01-28 | End: 2023-01-29 | Stop reason: HOSPADM

## 2023-01-28 RX ORDER — ACETAMINOPHEN 500 MG
1000 TABLET ORAL EVERY 8 HOURS SCHEDULED
Status: DISCONTINUED | OUTPATIENT
Start: 2023-01-28 | End: 2023-01-29 | Stop reason: HOSPADM

## 2023-01-28 RX ORDER — DOCUSATE SODIUM 100 MG/1
100 CAPSULE, LIQUID FILLED ORAL 2 TIMES DAILY
Status: DISCONTINUED | OUTPATIENT
Start: 2023-01-28 | End: 2023-01-29 | Stop reason: HOSPADM

## 2023-01-28 RX ORDER — ACETAMINOPHEN 650 MG/1
650 SUPPOSITORY RECTAL EVERY 8 HOURS SCHEDULED
Status: DISCONTINUED | OUTPATIENT
Start: 2023-01-28 | End: 2023-01-29 | Stop reason: HOSPADM

## 2023-01-28 RX ORDER — POTASSIUM CHLORIDE 20 MEQ/1
40 TABLET, EXTENDED RELEASE ORAL ONCE
Status: DISCONTINUED | OUTPATIENT
Start: 2023-01-28 | End: 2023-01-29 | Stop reason: HOSPADM

## 2023-01-28 RX ORDER — POTASSIUM CHLORIDE 20 MEQ/1
40 TABLET, EXTENDED RELEASE ORAL ONCE
Status: COMPLETED | OUTPATIENT
Start: 2023-01-28 | End: 2023-01-28

## 2023-01-28 RX ORDER — POTASSIUM CHLORIDE 7.45 MG/ML
10 INJECTION INTRAVENOUS PRN
Status: DISCONTINUED | OUTPATIENT
Start: 2023-01-28 | End: 2023-01-29 | Stop reason: HOSPADM

## 2023-01-28 RX ADMIN — MONTELUKAST 10 MG: 10 TABLET, FILM COATED ORAL at 08:39

## 2023-01-28 RX ADMIN — OXYCODONE HYDROCHLORIDE 10 MG: 10 TABLET ORAL at 01:14

## 2023-01-28 RX ADMIN — PANTOPRAZOLE SODIUM 40 MG: 40 TABLET, DELAYED RELEASE ORAL at 08:39

## 2023-01-28 RX ADMIN — DOCUSATE SODIUM 100 MG: 100 CAPSULE, LIQUID FILLED ORAL at 09:29

## 2023-01-28 RX ADMIN — PIPERACILLIN AND TAZOBACTAM 4500 MG: 4; .5 INJECTION, POWDER, LYOPHILIZED, FOR SOLUTION INTRAVENOUS at 20:34

## 2023-01-28 RX ADMIN — FLUTICASONE PROPIONATE 2 PUFF: 110 AEROSOL, METERED RESPIRATORY (INHALATION) at 07:29

## 2023-01-28 RX ADMIN — PIPERACILLIN AND TAZOBACTAM 4500 MG: 4; .5 INJECTION, POWDER, LYOPHILIZED, FOR SOLUTION INTRAVENOUS at 05:21

## 2023-01-28 RX ADMIN — IBUPROFEN 600 MG: 600 TABLET, FILM COATED ORAL at 12:28

## 2023-01-28 RX ADMIN — LOSARTAN POTASSIUM 25 MG: 25 TABLET, FILM COATED ORAL at 08:39

## 2023-01-28 RX ADMIN — POTASSIUM CHLORIDE 40 MEQ: 1500 TABLET, EXTENDED RELEASE ORAL at 08:39

## 2023-01-28 RX ADMIN — ENOXAPARIN SODIUM 30 MG: 100 INJECTION SUBCUTANEOUS at 20:32

## 2023-01-28 RX ADMIN — OXYCODONE HYDROCHLORIDE 5 MG: 5 TABLET ORAL at 09:29

## 2023-01-28 RX ADMIN — OXYCODONE HYDROCHLORIDE 10 MG: 10 TABLET ORAL at 05:20

## 2023-01-28 RX ADMIN — INSULIN LISPRO 1 UNITS: 100 INJECTION, SOLUTION INTRAVENOUS; SUBCUTANEOUS at 12:29

## 2023-01-28 RX ADMIN — PIPERACILLIN AND TAZOBACTAM 4500 MG: 4; .5 INJECTION, POWDER, LYOPHILIZED, FOR SOLUTION INTRAVENOUS at 13:29

## 2023-01-28 RX ADMIN — OXYCODONE HYDROCHLORIDE 10 MG: 10 TABLET ORAL at 13:25

## 2023-01-28 RX ADMIN — METRONIDAZOLE 500 MG: 500 INJECTION, SOLUTION INTRAVENOUS at 01:16

## 2023-01-28 RX ADMIN — DOCUSATE SODIUM 100 MG: 100 CAPSULE, LIQUID FILLED ORAL at 20:31

## 2023-01-28 RX ADMIN — ENOXAPARIN SODIUM 30 MG: 100 INJECTION SUBCUTANEOUS at 08:39

## 2023-01-28 RX ADMIN — Medication 1 CAPSULE: at 08:39

## 2023-01-28 RX ADMIN — METRONIDAZOLE 500 MG: 500 INJECTION, SOLUTION INTRAVENOUS at 08:43

## 2023-01-28 RX ADMIN — IBUPROFEN 600 MG: 600 TABLET, FILM COATED ORAL at 19:03

## 2023-01-28 RX ADMIN — PANTOPRAZOLE SODIUM 40 MG: 40 TABLET, DELAYED RELEASE ORAL at 20:31

## 2023-01-28 ASSESSMENT — PAIN DESCRIPTION - DESCRIPTORS
DESCRIPTORS: SHARP
DESCRIPTORS: SHARP
DESCRIPTORS: SHARP;STABBING
DESCRIPTORS: ACHING;DISCOMFORT
DESCRIPTORS: ACHING;DISCOMFORT
DESCRIPTORS: DULL

## 2023-01-28 ASSESSMENT — PAIN - FUNCTIONAL ASSESSMENT

## 2023-01-28 ASSESSMENT — PAIN DESCRIPTION - ORIENTATION
ORIENTATION: RIGHT
ORIENTATION: RIGHT;MID
ORIENTATION: RIGHT
ORIENTATION: RIGHT;MID

## 2023-01-28 ASSESSMENT — PAIN DESCRIPTION - ONSET
ONSET: ON-GOING
ONSET: ON-GOING

## 2023-01-28 ASSESSMENT — PAIN DESCRIPTION - LOCATION
LOCATION: ABDOMEN

## 2023-01-28 ASSESSMENT — PAIN SCALES - GENERAL
PAINLEVEL_OUTOF10: 7
PAINLEVEL_OUTOF10: 5
PAINLEVEL_OUTOF10: 4
PAINLEVEL_OUTOF10: 3
PAINLEVEL_OUTOF10: 5
PAINLEVEL_OUTOF10: 3
PAINLEVEL_OUTOF10: 9
PAINLEVEL_OUTOF10: 9
PAINLEVEL_OUTOF10: 6
PAINLEVEL_OUTOF10: 0
PAINLEVEL_OUTOF10: 2
PAINLEVEL_OUTOF10: 6
PAINLEVEL_OUTOF10: 3

## 2023-01-28 ASSESSMENT — PAIN DESCRIPTION - PAIN TYPE
TYPE: ACUTE PAIN;SURGICAL PAIN
TYPE: SURGICAL PAIN;ACUTE PAIN

## 2023-01-28 ASSESSMENT — PAIN DESCRIPTION - FREQUENCY
FREQUENCY: CONTINUOUS
FREQUENCY: CONTINUOUS

## 2023-01-28 NOTE — PROGRESS NOTES
Hospitalist Progress Note      PCP: Dylan Novak MD    Date of Admission: 1/24/2023    Chief Complaint:   Chief Complaint   Patient presents with    Abdominal Pain     Stabbing pain in center of stomach starting 0830. Emesis on going since. \"Pain wont go away\"      Hospital Course: The patient is a 41 y.o. female with past medical history as below including diabetes and high blood pressure and high cholesterol who presents to [facility] with acute onset and waxing and waning epigastric abdominal pain that is sharp in nature and has been occurring since this past morning. She had not had pain like this before or more recently. She notes that it was associated with decreased appetite throughout the rest of the day and increasing intermittent episodes of nausea and vomiting. At 1 point she did feel as though the abdominal pain was associate with her having vomiting and then feeling lightheaded to the point of passing out but she did not actually pass out. She is uncertain as to what may be causing this abdominal pain but she does not feel as though she ate anything out of the ordinary to trigger the symptoms and no one in her family has had similar symptoms. She denies any other recent symptoms prior to today's abdominal pain nausea vomiting of fever, chills, dizziness, syncope, dysuria, blood in urine/stool/sputum/vomitus, rashes, diarrhea, chest pain, shortness of breath. 01/25: Patient still having significant abdominal pain. She states that she has history of a sliding hiatal hernia, but states that she has never had pain like this before. Discussed CT abdomen and ultrasound results with the patient. GI is planning for EGD this afternoon. Appreciate their input. 01/26: EGD revealing large amount of food retained in the stomach, suggesting gastroparesis. HIDA scan: Nonvisualization of the gallbladder suggesting cystic duct obstruction/acute cholecystitis.   General surgery is with plan for possible cholecystectomy today versus tomorrow. Patient still febrile on zosyn, WBC up to 19k. Will add Flagyl. 01/27: Laparoscopic cholecystectomy with cholangiogram with Dr. Mazin Lambert today. Noted to have gangrenous changes to the gallbladder and a normal cholangiogram. Patient complains of some incisional pain during my visit, but states that her abdomen does feel better overall. She is back on regular diet per surgery and is tolerating this well. Subjective:     Patient is tolerating diet well. States that her abdomen feels a lot better except for some incisional pain now. White blood cell count is coming down.     Medications:  Reviewed    Infusion Medications    sodium chloride 125 mL/hr at 01/27/23 0735    dextrose       Scheduled Medications    piperacillin-tazobactam  4,500 mg IntraVENous Q8H    metroNIDAZOLE  500 mg IntraVENous Q8H    lactobacillus  1 capsule Oral Daily with breakfast    insulin lispro  0-4 Units SubCUTAneous Q4H    sodium chloride flush  5-40 mL IntraVENous 2 times per day    enoxaparin  30 mg SubCUTAneous BID    fluticasone  2 puff Inhalation Daily    losartan  25 mg Oral Daily    montelukast  10 mg Oral Daily    pantoprazole  40 mg Oral BID     PRN Meds: oxyCODONE **OR** oxyCODONE, sodium chloride flush, sodium chloride, acetaminophen **OR** acetaminophen, albuterol sulfate HFA, glucose, dextrose bolus **OR** dextrose bolus, glucagon (rDNA), dextrose, HYDROmorphone **OR** HYDROmorphone, naloxone, hydrALAZINE      Intake/Output Summary (Last 24 hours) at 1/28/2023 0725  Last data filed at 1/28/2023 0220  Gross per 24 hour   Intake 1302.07 ml   Output 10 ml   Net 1292.07 ml         Physical Exam Performed:    /73   Pulse 96   Temp 97.8 °F (36.6 °C) (Oral)   Resp 16   Ht 5' 7\" (1.702 m)   Wt (!) 314 lb 12.8 oz (142.8 kg)   LMP  (Approximate) Comment: \"4 weeks ago\"  SpO2 96%   BMI 49.30 kg/m²     General appearance: No apparent distress, appears stated age and cooperative. HEENT: Pupils equal, round, and reactive to light. Conjunctivae/corneas clear. Neck: Supple, with full range of motion. No jugular venous distention. Trachea midline. Respiratory:  Normal respiratory effort. Clear to auscultation, bilaterally without Rales/Wheezes/Rhonchi. Cardiovascular: Regular rate and rhythm with normal S1/S2 without murmurs, rubs or gallops. Abdomen: Soft, some tenderness with palpation, non-distended with normal bowel sounds. Abdominal incision dressings clean dry and intact  Musculoskeletal: No clubbing, cyanosis or edema bilaterally. Full range of motion without deformity. Skin: Skin color, texture, turgor normal.  No rashes or lesions. Neurologic:  Neurovascularly intact without any focal sensory/motor deficits. Cranial nerves: II-XII intact, grossly non-focal.  Psychiatric: Alert and oriented, thought content appropriate, normal insight  Capillary Refill: Brisk,< 3 seconds   Peripheral Pulses: +2 palpable, equal bilaterally       Labs:   Recent Labs     01/26/23  0716 01/27/23  1216   WBC 19.0* 16.9*   HGB 12.1 11.6*   HCT 38.0 35.8*    243       Recent Labs     01/26/23  0716 01/27/23  1216 01/28/23  0607    137 136   K 3.5 4.5 3.3*    101 101   CO2 23 21 21   BUN 7 7 8   CREATININE 0.5* 0.6 0.6   CALCIUM 8.3 8.2* 8.3       Recent Labs     01/26/23  0716 01/27/23  1216 01/28/23  0607   AST 12* 48* 13*   ALT 12 20 14   BILITOT 1.2* 0.6 0.5   ALKPHOS 99 113 103       No results for input(s): INR in the last 72 hours.   Recent Labs     01/25/23  1213   TROPONINI <0.01         Urinalysis:      Lab Results   Component Value Date/Time    NITRU Negative 01/24/2023 02:41 PM    WBCUA 0 01/24/2023 02:41 PM    BACTERIA None Seen 01/24/2023 02:41 PM    RBCUA 4 01/24/2023 02:41 PM    BLOODU SMALL 01/24/2023 02:41 PM    SPECGRAV 1.022 01/24/2023 02:41 PM    GLUCOSEU Negative 01/24/2023 02:41 PM       Radiology:  FL CHOLANGIOGRAM OR   Final Result   No obstructing distal common duct filling defect. NM HEPATOBILIARY   Final Result   Nonvisualization of the gallbladder suggesting cystic duct obstruction/acute   cholecystitis. CT ABDOMEN PELVIS W IV CONTRAST Additional Contrast? None   Final Result   Diffuse hepatic steatosis. Mildly distended gallbladder with cholelithiasis. US GALLBLADDER RUQ   Final Result   Cholelithiasis without evidence of cholecystitis. Increased echogenicity of the liver compatible with diffuse hepatocellular   disease, most commonly due to hepatic steatosis. Assessment/Plan:    Active Hospital Problems    Diagnosis     Calculus of gallbladder without cholecystitis without obstruction [K80.20]      Priority: Medium    Sepsis ([address] [A41.9]      Priority: Medium    Abdominal pain [R10.9]      Priority: Medium    DM2 (diabetes mellitus, type 2) ([address] [E11.9]      Priority: Medium    Hyperlipidemia LDL goal <100 [E78.5]      Sepsis secondary to gangrenous cholecystitis. POA: tachycardia, tachypnea, leukocytosis, febrile. Status post laparoscopic cholecystectomy with cholangiogram on 1/27/2023 with Dr. Kalyani Zapata  - CT abd/pel: Diffuse hepatic steatosis. Mildly distended gallbladder with cholelithiasis. -Right upper quadrant ultrasound with cholelithiasis without evidence of cholecystitis. Increased echogenicity of the liver compatible with diffuse hepatocellular disease, most commonly due to hepatic steatosis  - IV Zosyn  - GI and general surgery consulted  - Received 30ml/kg bolus in ED  - Analgesia as needed  - EGD on 01/25/23: Large amount of food retained, suspect Gastroparesis  - HIDA scan with nonvisualization of the gallbladder suggesting cystic duct obstruction/acute cholecystitis  - General surgery following    Essential HTN, controlled. - Continue home losartan  - Telemetry monitoring    DM2, uncontrolled.   - Hemoglobin a1c 6.9  - LDSSI  - POCT Q4hrs while NPO  - Hypoglycemia protocol  - Carb control diet when able    Morbid Obesity -  With Body mass index is 49.3 kg/m². Complicating assessment and treatment. Placing patient at risk for multiple co-morbidities as well as early death and contributing to the patient's presentation. Counseled on weight loss      DVT Prophylaxis: lovenox  Diet: ADULT DIET; Regular  Code Status: Full Code    PT/OT Eval Status: not ordered    Dispo - pending clinical improvement    Jayshree Goodwin - EFRAÍN

## 2023-01-28 NOTE — PLAN OF CARE
Problem: Pain  Goal: Verbalizes/displays adequate comfort level or baseline comfort level  1/27/2023 2035 by Kulwant Cole RN  Outcome: Progressing  1/27/2023 1158 by Escobar Hammond RN  Outcome: Progressing    Pain/discomfort being managed with PRN analgesics per MD orders. Pt able to express presence and absence of pain and rate pain appropriately using numerical scale. Non-pharmacologic comfort measures implemented. Rest and comfort promoted.       Problem: ABCDS Injury Assessment  Goal: Absence of physical injury  1/27/2023 2035 by Kulwant Cole RN  Outcome: Progressing  1/27/2023 1158 by Escobar Hammond RN  Outcome: Progressing     Problem: Gastrointestinal - Adult  Goal: Minimal or absence of nausea and vomiting  1/27/2023 2035 by Kulwant Cole RN  Outcome: Progressing  1/27/2023 1158 by Escobar Hammond RN  Outcome: Progressing     Problem: Gastrointestinal - Adult  Goal: Maintains or returns to baseline bowel function  1/27/2023 2035 by Kulwant Cole RN  Outcome: Progressing  1/27/2023 1158 by Escobar Hammond RN  Outcome: Progressing     Problem: Gastrointestinal - Adult  Goal: Maintains adequate nutritional intake  1/27/2023 2035 by Kulwant Cole RN  Outcome: Progressing  1/27/2023 1158 by Escobar Hammond RN  Outcome: Progressing      Problem: Discharge Planning  Goal: Discharge to home or other facility with appropriate resources  1/27/2023 2035 by Kulwant Cole RN  Outcome: Progressing  1/27/2023 1158 by Escobar Hammond RN  Outcome: Progressing

## 2023-01-28 NOTE — PROGRESS NOTES
[facility] General Surgery                                   Daily Progress Note                                                         Pt Name: Zi Driver  Medical Record Number: [id number]  Date of Birth 1975   Today's Date: 1/28/2023  Chief Complaint   Patient presents with    Abdominal Pain     Stabbing pain in center of stomach starting 0830. Emesis on going since. \"Pain wont go away\"         ASSESSMENT/PLAN  Acute gangrenous cholecystitis s/p lap cholecystectomy with cholangiogram POD #1              continue IV abx. Leukocytosis improving   Continue regular diet. Will add colace for bowel regimen   Schedule tylenol and ibuprofen. Dilaudid and oxycodone prn   Ambulate/OOB   Hopefully home over the next day or two   Ok to shower    DM - per primary team            SUBJECTIVE  Bernis Ripple is resting in bed. Pain present but improved from yesterday. Tolerating regular diet. []Pain []nausea  []vomiting  [x]passing flatus   []BM      OBJECTIVE  VITALS:  height is 5' 7\" (1.702 m) and weight is 314 lb 12.8 oz (142.8 kg) (abnormal). Her oral temperature is 98.5 °F (36.9 °C). Her blood pressure is 111/73 and her pulse is 86. Her respiration is 16 and oxygen saturation is 92%. INTAKE/OUTPUT:    Intake/Output Summary (Last 24 hours) at 1/28/2023 0916  Last data filed at 1/28/2023 0220  Gross per 24 hour   Intake 902.07 ml   Output --   Net 902.07 ml       GENERAL: alert, cooperative, no distress  LUNGS: normal respiratory rate, no accessory muscle use  HEART: normal rate and regular rhythm  ABDOMEN: Soft, ND, appropriately tender, dressings c/d/I, no erythema or induration  EXTREMITY: no cyanosis, clubbing or edema    I/O last 3 completed shifts: In: 1792.1 [P.O.:840;  I.V.:400; IV Piggyback:552.1]  Out: 410 [Urine:400; Blood:10]      LABS  Recent Labs     01/28/23  0607   WBC 14.9*   HGB 11.1*   HCT 35.0*         K 3.3*      CO2 21 BUN 8   CREATININE 0.6   MG 2.30   CALCIUM 8.3   AST 13*   ALT 14   BILITOT 0.5         EDUCATION  Patient educated about Disease Process, Medications, Smoking Cessation, Oxygenation, Incentive Spirometry and Deep Breath and Cough, Diabetes, Hyperlipidemia, Smoking Cessation, Nutrition, Exercise and Hypertension    Electronically signed by Dipak Rucker MD on 1/28/2023 at 9:16 AM      [facility]   [phone] Office  [phone] Fax

## 2023-01-28 NOTE — PROGRESS NOTES
INPATIENT PROGRESS NOTE        IDENTIFYING DATA/REASON FOR CONSULTATION   PATIENT:  Gladys Shannon  MRN:  [id number]  ADMIT DATE: 2023  TIME OF EVALUATION: 2023 6:20 AM  HOSPITAL STAY:   LOS: 4 days   CONSULTING PHYSICIAN: Darrin Oliveira MD   REASON FOR CONSULTATION: abdominal pain    Subjective:    -Patient is feeling much better. She tolerated last night. MEDICATIONS   SCHEDULED:  piperacillin-tazobactam, 4,500 mg, Q8H  metroNIDAZOLE, 500 mg, Q8H  lactobacillus, 1 capsule, Daily with breakfast  insulin lispro, 0-4 Units, Q4H  sodium chloride flush, 5-40 mL, 2 times per day  enoxaparin, 30 mg, BID  fluticasone, 2 puff, Daily  losartan, 25 mg, Daily  montelukast, 10 mg, Daily  pantoprazole, 40 mg, BID    FLUIDS/DRIPS:     sodium chloride 125 mL/hr at 23 0735    dextrose       PRNs: oxyCODONE, 5 mg, Q4H PRN   Or  oxyCODONE, 10 mg, Q4H PRN  sodium chloride flush, 5-40 mL, PRN  sodium chloride, , PRN  acetaminophen, 650 mg, Q6H PRN   Or  acetaminophen, 650 mg, Q6H PRN  albuterol sulfate HFA, 2 puff, Q4H PRN  glucose, 4 tablet, PRN  dextrose bolus, 125 mL, PRN   Or  dextrose bolus, 250 mL, PRN  glucagon (rDNA), 1 mg, PRN  dextrose, , Continuous PRN  HYDROmorphone, 0.25 mg, Q4H PRN   Or  HYDROmorphone, 0.5 mg, Q4H PRN  naloxone, 0.4 mg, PRN  hydrALAZINE, 5 mg, Q4H PRN    ALLERGIES:    Allergies   Allergen Reactions    Fluoxetine Other (See Comments)     Overtim, head ache    Lisinopril Other (See Comments)     cough    Oseltamivir Phosphate Other (See Comments)    Lexapro [Escitalopram Oxalate] Other (See Comments)     sleepy    Methylisothiazolinone Itching and Rash    Sulfa Antibiotics Rash         PHYSICAL EXAM   VITALS:  /73   Pulse 96   Temp 97.8 °F (36.6 °C) (Oral)   Resp 16   Ht 5' 7\" (1.702 m)   Wt (!) 314 lb 12.8 oz (142.8 kg)   LMP  (Approximate) Comment: \"4 weeks ago\"  SpO2 96%   BMI 49.30 kg/m²   TEMPERATURE:  Current - Temp: 97.8 °F (36.6 °C);  Max - Temp  Av.8 °F (36.6 °C)  Min: 96.4 °F (35.8 °C)  Max: 98.6 °F (37 °C)    Physical Exam:  General appearance: alert, cooperative, no distress, appears stated age  Eyes: Anicteric  Head: Normocephalic, without obvious abnormality  Lungs: clear to auscultation bilaterally, Normal Effort  Heart: regular rate and rhythm, normal S1 and S2, no murmurs or rubs  Abdomen: soft, non-distended, mild tenderness RUQ. Bowel sounds normal.   Extremities: atraumatic, no cyanosis or edema  Skin: warm and dry, no jaundice  Neuro: Grossly intact, A&OX3    LABS AND IMAGING   Laboratory   Recent Labs     01/26/23  0716 01/27/23  1216   WBC 19.0* 16.9*   HGB 12.1 11.6*   HCT 38.0 35.8*   MCV 86.9 86.6    243     Recent Labs     01/26/23  0716 01/27/23  1216    137   K 3.5 4.5    101   CO2 23 21   BUN 7 7   CREATININE 0.5* 0.6     Recent Labs     01/26/23  0716 01/27/23  1216   AST 12* 48*   ALT 12 20   BILITOT 1.2* 0.6   ALKPHOS 99 113     No results for input(s): LIPASE, AMYLASE in the last 72 hours. No results for input(s): PROTIME, INR in the last 72 hours. Imaging  FL CHOLANGIOGRAM OR   Final Result   No obstructing distal common duct filling defect. NM HEPATOBILIARY   Final Result   Nonvisualization of the gallbladder suggesting cystic duct obstruction/acute   cholecystitis. CT ABDOMEN PELVIS W IV CONTRAST Additional Contrast? None   Final Result   Diffuse hepatic steatosis. Mildly distended gallbladder with cholelithiasis. US GALLBLADDER RUQ   Final Result   Cholelithiasis without evidence of cholecystitis. Increased echogenicity of the liver compatible with diffuse hepatocellular   disease, most commonly due to hepatic steatosis. Endoscopy      ASSESSMENT AND RECOMMENDATIONS   Juan Pizarro is a 40 YO female with a PMH of HTN, SARA, obesity who presented on 1/24/2023 with abdominal pain. We have been consulted regarding abdominal pain.  Acute onset of abdominal pain starting yesterday AM. EGD with retained food suggestive of Gastroparesis which may be secondary to Trulicity but this is not a new medication. HIDA positive. Now s/p Cholecystectomy. Abdominal pain. Differentials include acute gastroenteritis, gallbladder disease, med induced (?trulicity), functional dyspepsia. HIDA scan nonvisualization of the gallbladder suggesting cystic duct obstruction/acute cholecystitis. Underwent cholecystectomy this morning    Suspected gastroparesis. EGD with large amt of retained food in stomach. May consider trial of Reglan if symptoms persist and gallbladder work up unrevealing    RECOMMENDATIONS:    -Patient feeling better s/p cholecystectomy. Tolerating diet. Will sign off at this point. Thank you for allowing me to participate in this patient's care. If there are any questions or concerns regarding this patient, or the plan we have set in place, please feel free to contact me at [phone].      Kaylan Duarte MD

## 2023-01-29 VITALS
HEART RATE: 89 BPM | OXYGEN SATURATION: 95 % | SYSTOLIC BLOOD PRESSURE: 129 MMHG | WEIGHT: 293 LBS | TEMPERATURE: 97.7 F | RESPIRATION RATE: 17 BRPM | HEIGHT: 67 IN | DIASTOLIC BLOOD PRESSURE: 83 MMHG | BODY MASS INDEX: 45.99 KG/M2

## 2023-01-29 LAB
A/G RATIO: 0.8 (ref 1.1–2.2)
ALBUMIN SERPL-MCNC: 2.9 G/DL (ref 3.4–5)
ALP BLD-CCNC: 102 U/L (ref 40–129)
ALT SERPL-CCNC: 14 U/L (ref 10–40)
ANION GAP SERPL CALCULATED.3IONS-SCNC: 12 MMOL/L (ref 3–16)
ANISOCYTOSIS: ABNORMAL
AST SERPL-CCNC: 19 U/L (ref 15–37)
BASOPHILS ABSOLUTE: 0 K/UL (ref 0–0.2)
BASOPHILS RELATIVE PERCENT: 0 %
BILIRUB SERPL-MCNC: 0.4 MG/DL (ref 0–1)
BUN BLDV-MCNC: 10 MG/DL (ref 7–20)
CALCIUM SERPL-MCNC: 8.7 MG/DL (ref 8.3–10.6)
CHLORIDE BLD-SCNC: 103 MMOL/L (ref 99–110)
CO2: 23 MMOL/L (ref 21–32)
CREAT SERPL-MCNC: 0.5 MG/DL (ref 0.6–1.1)
EOSINOPHILS ABSOLUTE: 0.1 K/UL (ref 0–0.6)
EOSINOPHILS RELATIVE PERCENT: 1 %
GFR SERPL CREATININE-BSD FRML MDRD: >60 ML/MIN/{1.73_M2}
GLUCOSE BLD-MCNC: 153 MG/DL (ref 70–99)
GLUCOSE BLD-MCNC: 163 MG/DL (ref 70–99)
GLUCOSE BLD-MCNC: 172 MG/DL (ref 70–99)
GLUCOSE BLD-MCNC: 181 MG/DL (ref 70–99)
HCT VFR BLD CALC: 35.2 % (ref 36–48)
HEMOGLOBIN: 11.3 G/DL (ref 12–16)
LYMPHOCYTES ABSOLUTE: 2.7 K/UL (ref 1–5.1)
LYMPHOCYTES RELATIVE PERCENT: 26 %
MAGNESIUM: 1.9 MG/DL (ref 1.8–2.4)
MCH RBC QN AUTO: 28.1 PG (ref 26–34)
MCHC RBC AUTO-ENTMCNC: 32 G/DL (ref 31–36)
MCV RBC AUTO: 87.7 FL (ref 80–100)
MONOCYTES ABSOLUTE: 0.1 K/UL (ref 0–1.3)
MONOCYTES RELATIVE PERCENT: 1 %
NEUTROPHILS ABSOLUTE: 7.5 K/UL (ref 1.7–7.7)
NEUTROPHILS RELATIVE PERCENT: 72 %
PDW BLD-RTO: 15.8 % (ref 12.4–15.4)
PERFORMED ON: ABNORMAL
PHOSPHORUS: 3.2 MG/DL (ref 2.5–4.9)
PLATELET # BLD: 278 K/UL (ref 135–450)
PLATELET SLIDE REVIEW: ADEQUATE
PMV BLD AUTO: 9.4 FL (ref 5–10.5)
POTASSIUM SERPL-SCNC: 3.8 MMOL/L (ref 3.5–5.1)
RBC # BLD: 4.01 M/UL (ref 4–5.2)
SODIUM BLD-SCNC: 138 MMOL/L (ref 136–145)
TOTAL PROTEIN: 6.4 G/DL (ref 6.4–8.2)
WBC # BLD: 10.4 K/UL (ref 4–11)

## 2023-01-29 PROCEDURE — 36415 COLL VENOUS BLD VENIPUNCTURE: CPT

## 2023-01-29 PROCEDURE — 99024 POSTOP FOLLOW-UP VISIT: CPT | Performed by: SURGERY

## 2023-01-29 PROCEDURE — 83735 ASSAY OF MAGNESIUM: CPT

## 2023-01-29 PROCEDURE — 85025 COMPLETE CBC W/AUTO DIFF WBC: CPT

## 2023-01-29 PROCEDURE — 84100 ASSAY OF PHOSPHORUS: CPT

## 2023-01-29 PROCEDURE — 94760 N-INVAS EAR/PLS OXIMETRY 1: CPT

## 2023-01-29 PROCEDURE — 2580000003 HC RX 258: Performed by: NURSE PRACTITIONER

## 2023-01-29 PROCEDURE — 94640 AIRWAY INHALATION TREATMENT: CPT

## 2023-01-29 PROCEDURE — 6360000002 HC RX W HCPCS: Performed by: NURSE PRACTITIONER

## 2023-01-29 PROCEDURE — 6360000002 HC RX W HCPCS: Performed by: SURGERY

## 2023-01-29 PROCEDURE — 6370000000 HC RX 637 (ALT 250 FOR IP): Performed by: SURGERY

## 2023-01-29 PROCEDURE — 80053 COMPREHEN METABOLIC PANEL: CPT

## 2023-01-29 RX ORDER — IBUPROFEN 600 MG/1
600 TABLET ORAL EVERY 8 HOURS
Qty: 90 TABLET | Refills: 0 | Status: SHIPPED | OUTPATIENT
Start: 2023-01-29 | End: 2023-02-28

## 2023-01-29 RX ORDER — OXYCODONE HYDROCHLORIDE 5 MG/1
5 TABLET ORAL EVERY 6 HOURS PRN
Qty: 10 TABLET | Refills: 0 | Status: SHIPPED | OUTPATIENT
Start: 2023-01-29 | End: 2023-02-03 | Stop reason: ALTCHOICE

## 2023-01-29 RX ORDER — AMOXICILLIN AND CLAVULANATE POTASSIUM 875; 125 MG/1; MG/1
1 TABLET, FILM COATED ORAL 2 TIMES DAILY
Qty: 10 TABLET | Refills: 0 | Status: SHIPPED | OUTPATIENT
Start: 2023-01-29 | End: 2023-02-03 | Stop reason: ALTCHOICE

## 2023-01-29 RX ADMIN — MONTELUKAST 10 MG: 10 TABLET, FILM COATED ORAL at 08:32

## 2023-01-29 RX ADMIN — Medication 1 CAPSULE: at 08:31

## 2023-01-29 RX ADMIN — PIPERACILLIN AND TAZOBACTAM 4500 MG: 4; .5 INJECTION, POWDER, LYOPHILIZED, FOR SOLUTION INTRAVENOUS at 06:13

## 2023-01-29 RX ADMIN — LOSARTAN POTASSIUM 25 MG: 25 TABLET, FILM COATED ORAL at 08:32

## 2023-01-29 RX ADMIN — FLUTICASONE PROPIONATE 2 PUFF: 110 AEROSOL, METERED RESPIRATORY (INHALATION) at 08:15

## 2023-01-29 RX ADMIN — ENOXAPARIN SODIUM 30 MG: 100 INJECTION SUBCUTANEOUS at 08:32

## 2023-01-29 RX ADMIN — PANTOPRAZOLE SODIUM 40 MG: 40 TABLET, DELAYED RELEASE ORAL at 08:32

## 2023-01-29 RX ADMIN — IBUPROFEN 600 MG: 600 TABLET, FILM COATED ORAL at 08:31

## 2023-01-29 ASSESSMENT — PAIN DESCRIPTION - ONSET: ONSET: GRADUAL

## 2023-01-29 ASSESSMENT — PAIN DESCRIPTION - PAIN TYPE: TYPE: SURGICAL PAIN

## 2023-01-29 ASSESSMENT — PAIN DESCRIPTION - FREQUENCY: FREQUENCY: INTERMITTENT

## 2023-01-29 ASSESSMENT — PAIN DESCRIPTION - LOCATION: LOCATION: ABDOMEN

## 2023-01-29 ASSESSMENT — PAIN DESCRIPTION - DESCRIPTORS: DESCRIPTORS: SHARP

## 2023-01-29 ASSESSMENT — PAIN - FUNCTIONAL ASSESSMENT: PAIN_FUNCTIONAL_ASSESSMENT: ACTIVITIES ARE NOT PREVENTED

## 2023-01-29 ASSESSMENT — PAIN DESCRIPTION - ORIENTATION: ORIENTATION: RIGHT

## 2023-01-29 ASSESSMENT — PAIN SCALES - GENERAL: PAINLEVEL_OUTOF10: 1

## 2023-01-29 NOTE — PLAN OF CARE
Problem: Discharge Planning  Goal: Discharge to home or other facility with appropriate resources  1/28/2023 2353 by Marc Carpio RN  Outcome: Progressing  1/28/2023 1435 by Melanie Webster RN  Outcome: Progressing     Problem: Pain  Goal: Verbalizes/displays adequate comfort level or baseline comfort level  1/28/2023 2353 by Marc Carpio RN  Outcome: Progressing  1/28/2023 1435 by Melanie Webster RN  Outcome: Progressing     Problem: ABCDS Injury Assessment  Goal: Absence of physical injury  1/28/2023 2353 by Marc Carpio RN  Outcome: Progressing  1/28/2023 1435 by Melanie Webster RN  Outcome: Progressing     Problem: Gastrointestinal - Adult  Goal: Minimal or absence of nausea and vomiting  1/28/2023 2353 by Marc Carpio RN  Outcome: Progressing  1/28/2023 1435 by Melanie Webster RN  Outcome: Progressing  Goal: Maintains or returns to baseline bowel function  1/28/2023 2353 by Marc Carpio RN  Outcome: Progressing  1/28/2023 1435 by Melanie Webster RN  Outcome: Progressing  Goal: Maintains adequate nutritional intake  1/28/2023 2353 by Marc Carpio RN  Outcome: Progressing  1/28/2023 1435 by Melanie Webster RN  Outcome: Progressing     Problem: Metabolic/Fluid and Electrolytes - Adult  Goal: Electrolytes maintained within normal limits  1/28/2023 2353 by Marc Carpio RN  Outcome: Progressing  1/28/2023 1435 by Melanie Webster RN  Outcome: Progressing  Goal: Hemodynamic stability and optimal renal function maintained  1/28/2023 2353 by Marc Carpio RN  Outcome: Progressing  1/28/2023 1435 by Melanie Webster RN  Outcome: Progressing  Goal: Glucose maintained within prescribed range  1/28/2023 2353 by Marc Carpio RN  Outcome: Progressing  1/28/2023 1435 by Melanie Webster RN  Outcome: Progressing     Problem: Cardiovascular - Adult  Goal: Maintains optimal cardiac output and hemodynamic stability  1/28/2023 2353 by Marc Carpio RN  Outcome: Progressing  1/28/2023 1435 by Vaishali Dong RN  Outcome: Progressing  Goal: Absence of cardiac dysrhythmias or at baseline  1/28/2023 2353 by Rico Walsh RN  Outcome: Progressing  1/28/2023 1435 by Vaishali Dong RN  Outcome: Progressing     Problem: Chronic Conditions and Co-morbidities  Goal: Patient's chronic conditions and co-morbidity symptoms are monitored and maintained or improved  1/28/2023 2353 by Rico Walsh RN  Outcome: Progressing  1/28/2023 1435 by Vaishali Dong RN  Outcome: Progressing

## 2023-01-29 NOTE — CARE COORDINATION
Discharge order noted. Chart reviewed. Plan is to return home. Previously denied needs. No additional discharge needs identified at this time.      Electronically signed by John Barth RN MSN on 1/29/2023 at 11:49 AM

## 2023-01-29 NOTE — DISCHARGE INSTRUCTIONS
Jj Petit MD     General and Vascular Surgery   Dudley Escobar MD     [address] [facility] [address]     [address] [address], [place] [address]  [place]   Phone: [phone]           Fax: [phone]                                                         POST-OPERATIVE INSTRUCTIONS FOR LAPAROSCOPIC CHOLECYSTECTOMY    Call the office to schedule your post-operative appointment with your surgeon for two (2) weeks. You will have steri strips closing your incisions. You may shower. Wash incisions gently, and pat them dry. Do not rub your incisions. Do not soak incisions in tub baths, hot tubs or pools    General guidelines for activity:  Avoid strenuous activity or lifting anything heavier than 15 pounds for 4-6 weeks. It is OK to be up walking around; walking up and down stairs is also OK. Do what is comfortable: stop and rest when you feel tired. Drink plenty of fluids and stay on a bland diet for 2-3 days after surgery. Do NOT drive for one week and while taking your narcotic pain medicine. Watch for signs of infection:   Fever over 100.5°   Excessive warmth or bright redness around your incisions  Leakage of bloody or cloudy fluid from your incisions    During the laparoscopic procedure that you had, gas is pumped into the abdominal cavity. You may feel abdominal, shoulder, or rib pain for a few days due to this. You will have pain medicine ordered. Take as directed. If you experience constipation  Increase your water intake, and activity; walking is best.  A stool softener or mild laxative may be necessary if you still have not had a bowel  movement ; call the office for further instructions.

## 2023-01-29 NOTE — PLAN OF CARE
Problem: Discharge Planning  Goal: Discharge to home or other facility with appropriate resources  1/29/2023 1129 by Amol Brooks RN  Outcome: Completed  1/28/2023 2353 by Arben Stein RN  Outcome: Progressing  Flowsheets (Taken 1/28/2023 2029)  Discharge to home or other facility with appropriate resources: Identify barriers to discharge with patient and caregiver     Problem: Pain  Goal: Verbalizes/displays adequate comfort level or baseline comfort level  1/29/2023 1129 by Amol Brooks RN  Outcome: Completed  1/28/2023 2353 by Arben Stein RN  Outcome: Progressing     Problem: ABCDS Injury Assessment  Goal: Absence of physical injury  1/29/2023 1129 by Amol Brooks RN  Outcome: Completed  1/28/2023 2353 by Arben Stein RN  Outcome: Progressing     Problem: Gastrointestinal - Adult  Goal: Minimal or absence of nausea and vomiting  1/29/2023 1129 by Amol Brooks RN  Outcome: Completed  1/28/2023 2353 by Arben Stein RN  Outcome: Progressing  Goal: Maintains or returns to baseline bowel function  1/29/2023 1129 by Amol Brooks RN  Outcome: Completed  1/28/2023 2353 by Arben Stein RN  Outcome: Progressing  Goal: Maintains adequate nutritional intake  1/29/2023 1129 by Amol Brooks RN  Outcome: Completed  1/28/2023 2353 by Arben Stein RN  Outcome: Progressing     Problem: Metabolic/Fluid and Electrolytes - Adult  Goal: Electrolytes maintained within normal limits  1/29/2023 1129 by Amol Brooks RN  Outcome: Completed  1/28/2023 2353 by Arben Stein RN  Outcome: Progressing  Flowsheets (Taken 1/28/2023 2029)  Electrolytes maintained within normal limits: Monitor labs and assess patient for signs and symptoms of electrolyte imbalances  Goal: Hemodynamic stability and optimal renal function maintained  1/29/2023 1129 by Amol Brooks RN  Outcome: Completed  1/28/2023 2353 by Arben Stein RN  Outcome: Progressing  Flowsheets (Taken 1/28/2023 2029)  Hemodynamic stability and optimal renal function maintained: Monitor labs and assess for signs and symptoms of volume excess or deficit  Goal: Glucose maintained within prescribed range  1/29/2023 1129 by Kati Gregg RN  Outcome: Completed  1/28/2023 2353 by Fanny Szymanski RN  Outcome: Progressing  Flowsheets (Taken 1/28/2023 2029)  Glucose maintained within prescribed range: Monitor blood glucose as ordered     Problem: Cardiovascular - Adult  Goal: Maintains optimal cardiac output and hemodynamic stability  1/29/2023 1129 by Kati Gregg RN  Outcome: Completed  1/28/2023 2353 by Fanny Szymanski RN  Outcome: Progressing  Goal: Absence of cardiac dysrhythmias or at baseline  1/29/2023 1129 by Kati Gregg RN  Outcome: Completed  1/28/2023 2353 by Fanny Szymanski RN  Outcome: Progressing     Problem: Chronic Conditions and Co-morbidities  Goal: Patient's chronic conditions and co-morbidity symptoms are monitored and maintained or improved  1/29/2023 1129 by Kati Gregg RN  Outcome: Completed  1/28/2023 2353 by Fanny Szymanski RN  Outcome: Progressing  Flowsheets (Taken 1/28/2023 2029)  Care Plan - Patient's Chronic Conditions and Co-Morbidity Symptoms are Monitored and Maintained or Improved: Monitor and assess patient's chronic conditions and comorbid symptoms for stability, deterioration, or improvement

## 2023-01-29 NOTE — DISCHARGE SUMMARY
Hospital Medicine Discharge Summary    Patient ID: Joselyn Yeboah      Patient's PCP: Nicole Allen MD    Admit Date: 1/24/2023     Discharge Date:   01/29/23      Admitting Physician: Tito Garcia DO     Discharge Physician: Erika Torrez - EFRAÍN     Discharge Diagnoses: Active Hospital Problems    Diagnosis     Calculus of gallbladder without cholecystitis without obstruction [K80.20]      Priority: Medium    Sepsis ([facility] [address] [A41.9]      Priority: Medium    Abdominal pain [R10.9]      Priority: Medium    DM2 (diabetes mellitus, type 2) ([facility] [address] [E11.9]      Priority: Medium    Hyperlipidemia LDL goal <100 [E78.5]        The patient was seen and examined on day of discharge and this discharge summary is in conjunction with any daily progress note from day of discharge. Hospital Course: The patient is a 41 y.o. female with past medical history as below including diabetes and high blood pressure and high cholesterol who presents to [facility] with acute onset and waxing and waning epigastric abdominal pain that is sharp in nature and has been occurring since this past morning. She had not had pain like this before or more recently. She notes that it was associated with decreased appetite throughout the rest of the day and increasing intermittent episodes of nausea and vomiting. At 1 point she did feel as though the abdominal pain was associate with her having vomiting and then feeling lightheaded to the point of passing out but she did not actually pass out. She is uncertain as to what may be causing this abdominal pain but she does not feel as though she ate anything out of the ordinary to trigger the symptoms and no one in her family has had similar symptoms. She denies any other recent symptoms prior to today's abdominal pain nausea vomiting of fever, chills, dizziness, syncope, dysuria, blood in urine/stool/sputum/vomitus, rashes, diarrhea, chest pain, shortness of breath. 01/25: Patient still having significant abdominal pain. She states that she has history of a sliding hiatal hernia, but states that she has never had pain like this before. Discussed CT abdomen and ultrasound results with the patient. GI is planning for EGD this afternoon. Appreciate their input. 01/26: EGD revealing large amount of food retained in the stomach, suggesting gastroparesis. HIDA scan: Nonvisualization of the gallbladder suggesting cystic duct obstruction/acute cholecystitis. General surgery is with plan for possible cholecystectomy today versus tomorrow. Patient still febrile on zosyn, WBC up to 19k. Will add Flagyl. 01/27: Laparoscopic cholecystectomy with cholangiogram with Dr. Aubrie Roche today. Noted to have gangrenous changes to the gallbladder and a normal cholangiogram. Patient complains of some incisional pain during my visit, but states that her abdomen does feel better overall. She is back on regular diet per surgery and is tolerating this well. Sepsis secondary to gangrenous cholecystitis. POA: tachycardia, tachypnea, leukocytosis, febrile. Status post laparoscopic cholecystectomy with cholangiogram on 1/27/2023 with Dr. Aubrie Roche. IMPROVED.  - CT abd/pel: Diffuse hepatic steatosis. Mildly distended gallbladder with cholelithiasis. -Right upper quadrant ultrasound with cholelithiasis without evidence of cholecystitis. Increased echogenicity of the liver compatible with diffuse hepatocellular disease, most commonly due to hepatic steatosis  - IV Zosyn changed to oral augmentin  - GI and general surgery consulted  - Received 30ml/kg bolus in ED  - EGD on 01/25/23: Large amount of food retained, suspect Gastroparesis  - HIDA scan with nonvisualization of the gallbladder suggesting cystic duct obstruction/acute cholecystitis  - General surgery follow up in 2 weeks     Essential HTN, controlled. - Continue home losartan     DM2, uncontrolled.   - Hemoglobin a1c 6.9  - Continue home meds  - Carb control diet      Morbid Obesity -  With Body mass index is 49.3 kg/m². Complicating assessment and treatment. Placing patient at risk for multiple co-morbidities as well as early death and contributing to the patient's presentation. Counseled on weight loss            Physical Exam Performed:     /89   Pulse 84   Temp 97.5 °F (36.4 °C) (Oral)   Resp 16   Ht 5' 7\" (1.702 m)   Wt (!) 314 lb 12.8 oz (142.8 kg)   LMP  (Approximate) Comment: \"4 weeks ago\"  SpO2 97%   BMI 49.30 kg/m²       General appearance:  No apparent distress, appears stated age and cooperative. HEENT:  Normal cephalic, atraumatic without obvious deformity. Pupils equal, round, and reactive to light. Extra ocular muscles intact. Conjunctivae/corneas clear. Neck: Supple, with full range of motion. No jugular venous distention. Trachea midline. Respiratory:  Normal respiratory effort. Clear to auscultation, bilaterally without Rales/Wheezes/Rhonchi. Cardiovascular:  Regular rate and rhythm with normal S1/S2 without murmurs, rubs or gallops. Abdomen: Soft, some tenderness with palpation, non-distended with normal bowel sounds. Abdominal incision dressings clean dry and intact  Musculoskeletal:  No clubbing, cyanosis or edema bilaterally. Full range of motion without deformity. Skin: Skin color, texture, turgor normal.  No rashes or lesions. Neurologic:  Neurovascularly intact without any focal sensory/motor deficits. Cranial nerves: II-XII intact, grossly non-focal.  Psychiatric:  Alert and oriented, thought content appropriate, normal insight  Capillary Refill: Brisk,< 3 seconds   Peripheral Pulses: +2 palpable, equal bilaterally       Labs:  For convenience and continuity at follow-up the following most recent labs are provided:      CBC:    Lab Results   Component Value Date/Time    WBC 14.9 01/28/2023 06:07 AM    HGB 11.1 01/28/2023 06:07 AM    HCT 35.0 01/28/2023 06:07 AM     01/28/2023 06:07 AM Renal:    Lab Results   Component Value Date/Time     01/28/2023 06:07 AM    K 3.3 01/28/2023 06:07 AM    K 4.3 01/24/2023 01:39 PM     01/28/2023 06:07 AM    CO2 21 01/28/2023 06:07 AM    BUN 8 01/28/2023 06:07 AM    CREATININE 0.6 01/28/2023 06:07 AM    CALCIUM 8.3 01/28/2023 06:07 AM         Significant Diagnostic Studies    Radiology:   FL CHOLANGIOGRAM OR   Final Result   No obstructing distal common duct filling defect. NM HEPATOBILIARY   Final Result   Nonvisualization of the gallbladder suggesting cystic duct obstruction/acute   cholecystitis. CT ABDOMEN PELVIS W IV CONTRAST Additional Contrast? None   Final Result   Diffuse hepatic steatosis. Mildly distended gallbladder with cholelithiasis. US GALLBLADDER RUQ   Final Result   Cholelithiasis without evidence of cholecystitis. Increased echogenicity of the liver compatible with diffuse hepatocellular   disease, most commonly due to hepatic steatosis.                 Consults:     PHARMACY TO DOSE VANCOMYCIN  IP CONSULT TO HOSPITALIST  IP CONSULT TO GENERAL SURGERY  IP CONSULT TO GI    Disposition:  Home    Condition at Discharge: Stable    Discharge Instructions/Follow-up:      Follow up with PCP in 1 week for hospital follow up    Follow up with Dr. Susana Carreno in 2 weeks    Code Status:  Full Code     Activity: activity as tolerated    Diet: low fat, low cholesterol diet      Discharge Medications:     Current Discharge Medication List             Details   ferrous sulfate (IRON 325) 325 (65 Fe) MG tablet Take 325 mg by mouth in the morning and at bedtime      losartan (COZAAR) 50 MG tablet TAKE 1 TABLET EVERY        MORNING(NEW DOSAGE)  Qty: 90 tablet, Refills: 1    Associated Diagnoses: Hypertension, essential; Diabetic nephropathy with proteinuria (HCC)      Accu-Chek Softclix Lancets MISC Test twice daily  Qty: 200 each, Refills: 3      blood glucose test strips (ASCENSIA AUTODISC VI;ONE TOUCH ULTRA TEST VI) strip 1 each by In Vitro route 2 times daily As needed. Qty: 200 each, Refills: 3    Comments: Please give Accu-check test strip supplies      blood glucose monitor kit and supplies Dispense sufficient amount for indicated testing frequency plus additional to accommodate PRN testing needs. Dispense all needed supplies to include: monitor, strips, lancing device, lancets, control solutions, alcohol swabs. Qty: 1 kit, Refills: 0    Comments: Accu-chek meter kit or Brand per patient preference. May round up to next available package size. dulaglutide (TRULICITY) 1.5 BG/4.0UK SC injection ADMINISTER 1.5 MG UNDER THE SKIN 1 TIME A WEEK  Qty: 2 mL, Refills: 3    Comments: Until ozempic available  Associated Diagnoses: Type 2 diabetes mellitus with diabetic nephropathy, without long-term current use of insulin ([facility])      sertraline (ZOLOFT) 50 MG tablet Take one tablet daily  Qty: 90 tablet, Refills: 1    Associated Diagnoses: Adjustment disorder with anxious mood      montelukast (SINGULAIR) 10 MG tablet Take one tablet daily  Qty: 90 tablet, Refills: 1    Associated Diagnoses: Cholinergic urticaria      metFORMIN (GLUCOPHAGE-XR) 500 MG extended release tablet Take 4 tablets daily with breakfast  Qty: 360 tablet, Refills: 1    Associated Diagnoses: Type 2 diabetes mellitus with diabetic nephropathy, without long-term current use of insulin ([facility] [address]  Gastroesophageal reflux disease without esophagitis      pantoprazole (PROTONIX) 40 MG tablet TAKE 1 TABLET BY MOUTH TWICE DAILY  Qty: 180 tablet, Refills: 1    Comments: **Patient requests 90 days supply**      FLOVENT DISKUS 100 MCG/BLIST AEPB inhaler INHALE 2 PUFFS INTO THE LUNGS DAILY  Qty: 60 each, Refills: 2    Associated Diagnoses: Mild intermittent asthma with acute exacerbation      norethindrone-ethinyl estradiol (ORTHO-NOVUM 7/7/7, 28,) 0.5/0.75/1-35 MG-MCG per tablet Take 1 tablet by mouth daily  Qty: 3 packet, Refills: 3    Comments: Cancel isobloom  Associated Diagnoses: DUB (dysfunctional uterine bleeding)      loratadine (CLARITIN) 10 MG tablet Take 10 mg by mouth daily. Time Spent on discharge is more than 1 hour in the examination, evaluation, counseling and review of medications and discharge plan. Signed:    Ella Young NP   1/29/2023      Thank you Jyoti Alex MD for the opportunity to be involved in this patient's care. If you have any questions or concerns please feel free to contact me at [phone].

## 2023-01-29 NOTE — PROGRESS NOTES
[facility] General Surgery                                   Daily Progress Note                                                         Pt Name: Carlos Wade  Medical Record Number: [id number]  Date of Birth 1975   Today's Date: 1/29/2023  Chief Complaint   Patient presents with    Abdominal Pain     Stabbing pain in center of stomach starting 0830. Emesis on going since. \"Pain wont go away\"         ASSESSMENT/PLAN  Acute gangrenous cholecystitis s/p lap cholecystectomy with cholangiogram POD #2              continue IV abx. Check morning labs. If leukocytosis resolved, OK to discharge home from surgical standpoint. Recommend to switch to augmentin x 5 days. Follow up with Dr. Medhat Ocampo in 2 weeks, call for appointment. DM - per primary team            SUBJECTIVE  Fabi Kirkland is resting in bed. Pain controlled. Tolerating regular diet. []Pain []nausea  []vomiting  [x]passing flatus   [x]BM      OBJECTIVE  VITALS:  height is 5' 7\" (1.702 m) and weight is 314 lb 12.8 oz (142.8 kg) (abnormal). Her oral temperature is 97.5 °F (36.4 °C). Her blood pressure is 136/80 and her pulse is 82. Her respiration is 20 and oxygen saturation is 97%. INTAKE/OUTPUT:    Intake/Output Summary (Last 24 hours) at 1/29/2023 0744  Last data filed at 1/29/2023 0424  Gross per 24 hour   Intake 1384.05 ml   Output --   Net 1384.05 ml       GENERAL: alert, cooperative, no distress  LUNGS: normal respiratory rate, no accessory muscle use  HEART: normal rate and regular rhythm  ABDOMEN: Soft, ND, appropriately tender, incisions c/d/I, no erythema or induration  EXTREMITY: no cyanosis, clubbing or edema    I/O last 3 completed shifts:   In: 1926.1 [P.O.:1200; IV Piggyback:726.1]  Out: -       LABS  Recent Labs     01/28/23  0607   WBC 14.9*   HGB 11.1*   HCT 35.0*         K 3.3*      CO2 21   BUN 8   CREATININE 0.6   MG 2.30   CALCIUM 8.3   AST 13*   ALT 14 BILITOT 0.5         EDUCATION  Patient educated about Disease Process, Medications, Smoking Cessation, Oxygenation, Incentive Spirometry and Deep Breath and Cough, Diabetes, Hyperlipidemia, Smoking Cessation, Nutrition, Exercise and Hypertension    Electronically signed by Bony Oneill MD on 1/29/2023 at 7:44 AM      [facility]   [phone] Office  [phone] Fax

## 2023-02-02 ENCOUNTER — TELEPHONE (OUTPATIENT)
Dept: FAMILY MEDICINE CLINIC | Age: 48
End: 2023-02-02

## 2023-02-02 SDOH — ECONOMIC STABILITY: HOUSING INSECURITY
IN THE LAST 12 MONTHS, WAS THERE A TIME WHEN YOU DID NOT HAVE A STEADY PLACE TO SLEEP OR SLEPT IN A SHELTER (INCLUDING NOW)?: NO

## 2023-02-02 SDOH — ECONOMIC STABILITY: TRANSPORTATION INSECURITY
IN THE PAST 12 MONTHS, HAS LACK OF TRANSPORTATION KEPT YOU FROM MEETINGS, WORK, OR FROM GETTING THINGS NEEDED FOR DAILY LIVING?: NO

## 2023-02-02 SDOH — ECONOMIC STABILITY: FOOD INSECURITY: WITHIN THE PAST 12 MONTHS, THE FOOD YOU BOUGHT JUST DIDN'T LAST AND YOU DIDN'T HAVE MONEY TO GET MORE.: NEVER TRUE

## 2023-02-02 SDOH — ECONOMIC STABILITY: INCOME INSECURITY: HOW HARD IS IT FOR YOU TO PAY FOR THE VERY BASICS LIKE FOOD, HOUSING, MEDICAL CARE, AND HEATING?: SOMEWHAT HARD

## 2023-02-02 SDOH — ECONOMIC STABILITY: FOOD INSECURITY: WITHIN THE PAST 12 MONTHS, YOU WORRIED THAT YOUR FOOD WOULD RUN OUT BEFORE YOU GOT MONEY TO BUY MORE.: NEVER TRUE

## 2023-02-02 NOTE — TELEPHONE ENCOUNTER
Care Transitions Initial Follow Up Call    Outreach made within 2 business days of discharge: Yes    Patient: Russell Moore Patient : 1975   MRN: [id number]  Reason for Admission: There are no discharge diagnoses documented for the most recent discharge. Discharge Date: 23       Spoke with: Stephanie Mota     Discharge department/facility: [facility]    TCM Interactive Patient Contact:  Was patient able to fill all prescriptions: Yes  Was patient instructed to bring all medications to the follow-up visit: Yes  Is patient taking all medications as directed in the discharge summary?  Yes  Does patient understand their discharge instructions: Yes    Does patient have questions or concerns that need addressed prior to 7-14 day follow up office visit: no   -no       Scheduled appointment with PCP within 7-14 days    Follow Up  Future Appointments   Date Time Provider Frannie Saenz   2/3/2023  1:00 PM OC [address]   2023  9:00 AM Darryl Palmer MD [id number]   3/28/2023  4:00 PM OC [address]   2023  3:40 PM OC [place] SLEEP MED [facility]       Brigid Chaparro MA

## 2023-02-03 ENCOUNTER — OFFICE VISIT (OUTPATIENT)
Dept: FAMILY MEDICINE CLINIC | Age: 48
End: 2023-02-03

## 2023-02-03 VITALS
BODY MASS INDEX: 45.99 KG/M2 | HEIGHT: 67 IN | OXYGEN SATURATION: 98 % | SYSTOLIC BLOOD PRESSURE: 108 MMHG | DIASTOLIC BLOOD PRESSURE: 80 MMHG | HEART RATE: 100 BPM | WEIGHT: 293 LBS

## 2023-02-03 DIAGNOSIS — D50.9 MICROCYTIC ANEMIA: ICD-10-CM

## 2023-02-03 DIAGNOSIS — K58.0 IRRITABLE BOWEL SYNDROME WITH DIARRHEA: ICD-10-CM

## 2023-02-03 DIAGNOSIS — E78.5 HYPERLIPIDEMIA LDL GOAL <100: ICD-10-CM

## 2023-02-03 DIAGNOSIS — A41.9 SEPSIS WITHOUT ACUTE ORGAN DYSFUNCTION, DUE TO UNSPECIFIED ORGANISM (HCC): ICD-10-CM

## 2023-02-03 DIAGNOSIS — E11.21 TYPE 2 DIABETES MELLITUS WITH DIABETIC NEPHROPATHY, WITHOUT LONG-TERM CURRENT USE OF INSULIN (HCC): ICD-10-CM

## 2023-02-03 DIAGNOSIS — R10.9 ABDOMINAL PAIN, UNSPECIFIED ABDOMINAL LOCATION: ICD-10-CM

## 2023-02-03 DIAGNOSIS — Z09 HOSPITAL DISCHARGE FOLLOW-UP: Primary | ICD-10-CM

## 2023-02-03 DIAGNOSIS — K80.20 CALCULUS OF GALLBLADDER WITHOUT CHOLECYSTITIS WITHOUT OBSTRUCTION: ICD-10-CM

## 2023-02-03 ASSESSMENT — PATIENT HEALTH QUESTIONNAIRE - PHQ9
2. FEELING DOWN, DEPRESSED OR HOPELESS: 0
SUM OF ALL RESPONSES TO PHQ QUESTIONS 1-9: 0
SUM OF ALL RESPONSES TO PHQ9 QUESTIONS 1 & 2: 0
SUM OF ALL RESPONSES TO PHQ QUESTIONS 1-9: 0
1. LITTLE INTEREST OR PLEASURE IN DOING THINGS: 0
SUM OF ALL RESPONSES TO PHQ QUESTIONS 1-9: 0
SUM OF ALL RESPONSES TO PHQ QUESTIONS 1-9: 0

## 2023-02-03 NOTE — PROGRESS NOTES
Post-Discharge Transitional Care  Follow Up      Fredrick Nichols   YOB: 1975    Date of Office Visit:  2/3/2023  Date of Hospital Admission: 1/24/23  Date of Hospital Discharge: 1/29/23  Risk of hospital readmission (high >=14%. Medium >=10%) :Readmission Risk Score: 9.3      Care management risk score Rising risk (score 2-5) and Complex Care (Scores >=6): No Risk Score On File     Non face to face  following discharge, date last encounter closed (first attempt may have been earlier): 02/02/2023    Call initiated 2 business days of discharge: Yes    ASSESSMENT/PLAN:   Hospital discharge follow-up  -     [place] DISCHARGE MEDS RECONCILED W/ CURRENT OUTPATIENT MED LIST  Calculus of gallbladder without cholecystitis without obstruction  S/p cholecystectomy- recovering well, follow up with surgeon next week  Sepsis without acute organ dysfunction, due to unspecified organism ([address]  2/2 to gangrenous cholecystitis: resolved  Abdominal pain, unspecified abdominal location  Resolved following cholecystectomy  Type 2 diabetes mellitus with diabetic nephropathy, without long-term current use of insulin (HCC)  -     Dulaglutide 0.75 MG/0.5ML SOPN; Inject 0.75 mg into the skin once a week, Disp-4 Adjustable Dose Pre-filled Pen Syringe, R-2Normal  HgbA1C 6.9% on 1/25/23  She would like to decrease her Trulicty dose until she is able to discuss side effects with her GI specialist  Hyperlipidemia LDL goal <100  Well controlled, tolerating medications, no changes  Irritable bowel syndrome with diarrhea  Per GI- planning to follow up with specialist soon  Microcytic anemia  Mild- continue to monitor    Medical Decision Making: moderate complexity  Return in about 8 weeks (around 3/28/2023).            Subjective:   HPI:  Follow up of Hospital problems/diagnosis(es):    Calculus of gallbladder without cholecystitis without obstruction     Sepsis without acute organ dysfunction, due to unspecified organism ([address] Abdominal pain, unspecified abdominal location     Type 2 diabetes mellitus with diabetic nephropathy, without long-term current use of insulin (HCC)     Hyperlipidemia LDL goal <100     Irritable bowel syndrome with diarrhea     Microcytic anemia         Inpatient course: Discharge summary reviewed- see chart. Interval history/Current status: Doing better since surgery    Has concerns about the effects of the higher trulicity dose and would like to decrease her dose until she is able to see her GI specialist    Finishing her augmentin today- has yeast infection but is self treating at home  Patient Active Problem List   Diagnosis    Seborrhea    Dysfunctional uterine bleeding    Candidal intertrigo    Urticaria, idiopathic    Low back pain    Allergic rhinitis    Asthma- mild persistent    GERD (gastroesophageal reflux disease)    Cholinergic urticaria    Hiatal hernia    Obesity, Class III, BMI 40-49.9 (morbid obesity) ([address]    Obstructive apnea- CPAP    Hyperlipidemia LDL goal <100    Type 2 diabetes mellitus with diabetic nephropathy (HCC)    Irritable bowel syndrome with diarrhea    History of colon polyps    Anxiety    Microcytic anemia    RLS (restless legs syndrome)    Hypertension, essential    Sepsis ([address]    Abdominal pain    DM2 (diabetes mellitus, type 2) ([address]    Calculus of gallbladder without cholecystitis without obstruction       Medications listed as ordered at the time of discharge from hospital     Medication List            Accurate as of February 3, 2023 11:59 PM. If you have any questions, ask your nurse or doctor. START taking these medications      Dulaglutide 0.75 MG/0.5ML Sopn  Inject 0.75 mg into the skin once a week  Started by: OC Silva - ROSA ELENA            CHANGE how you take these medications      Flovent Diskus 100 MCG/ACT Aepb  Generic drug: Fluticasone Propionate (Inhal)  INHALE 2 PUFFS INTO THE LUNGS DAILY  What changed: See the new instructions. CONTINUE taking these medications      Accu-Chek Softclix Lancets Misc  Test twice daily     blood glucose monitor kit and supplies  Dispense sufficient amount for indicated testing frequency plus additional to accommodate PRN testing needs. Dispense all needed supplies to include: monitor, strips, lancing device, lancets, control solutions, alcohol swabs.      ferrous sulfate 325 (65 Fe) MG tablet  Commonly known as: IRON 325     ibuprofen 600 MG tablet  Commonly known as: ADVIL;MOTRIN  Take 1 tablet by mouth every 8 (eight) hours     loratadine 10 MG tablet  Commonly known as: CLARITIN     losartan 50 MG tablet  Commonly known as: COZAAR  TAKE 1 TABLET EVERY        MORNING(NEW DOSAGE)     metFORMIN 500 MG extended release tablet  Commonly known as: GLUCOPHAGE-XR  Take 4 tablets daily with breakfast     montelukast 10 MG tablet  Commonly known as: SINGULAIR  Take one tablet daily     Ortho-Novum 7/7/7 (28) 0.5/0.75/1-35 MG-MCG per tablet  Generic drug: norethindrone-ethinyl estradiol  Take 1 tablet by mouth daily     pantoprazole 40 MG tablet  Commonly known as: PROTONIX  TAKE 1 TABLET BY MOUTH TWICE DAILY     sertraline 50 MG tablet  Commonly known as: ZOLOFT  Take one tablet daily            STOP taking these medications      amoxicillin-clavulanate 875-125 MG per tablet  Commonly known as: AUGMENTIN  Stopped by: OC Gibbs CNP     oxyCODONE 5 MG immediate release tablet  Commonly known as: ROXICODONE  Stopped by: OC Gibbs CNP               Where to Get Your Medications        These medications were sent to [address] [place] [address],[facility] [phone] [address] [facility] [address]      Phone: [phone]   Dulaglutide 0.75 MG/0.5ML Sopn           Medications marked \"taking\" at this time  Outpatient Medications Marked as Taking for the 2/3/23 encounter (Office Visit) with OC Gibbs CNP   Medication Sig Dispense Refill    Dulaglutide 0.75 MG/0.5ML SOPN Inject 0.75 mg into the skin once a week 4 Adjustable Dose Pre-filled Pen Syringe 2    ibuprofen (ADVIL;MOTRIN) 600 MG tablet Take 1 tablet by mouth every 8 (eight) hours 90 tablet 0    ferrous sulfate (IRON 325) 325 (65 Fe) MG tablet Take 325 mg by mouth in the morning and at bedtime      losartan (COZAAR) 50 MG tablet TAKE 1 TABLET EVERY        MORNING(NEW DOSAGE) 90 tablet 1    Accu-Chek Softclix Lancets MISC Test twice daily 200 each 3    blood glucose monitor kit and supplies Dispense sufficient amount for indicated testing frequency plus additional to accommodate PRN testing needs. Dispense all needed supplies to include: monitor, strips, lancing device, lancets, control solutions, alcohol swabs. 1 kit 0    sertraline (ZOLOFT) 50 MG tablet Take one tablet daily 90 tablet 1    montelukast (SINGULAIR) 10 MG tablet Take one tablet daily 90 tablet 1    metFORMIN (GLUCOPHAGE-XR) 500 MG extended release tablet Take 4 tablets daily with breakfast 360 tablet 1    pantoprazole (PROTONIX) 40 MG tablet TAKE 1 TABLET BY MOUTH TWICE DAILY 180 tablet 1    FLOVENT DISKUS 100 MCG/BLIST AEPB inhaler INHALE 2 PUFFS INTO THE LUNGS DAILY (Patient taking differently: Inhale 1 puff into the lungs daily) 60 each 2    norethindrone-ethinyl estradiol (ORTHO-NOVUM 7/7/7, 28,) 0.5/0.75/1-35 MG-MCG per tablet Take 1 tablet by mouth daily 3 packet 3    loratadine (CLARITIN) 10 MG tablet Take 10 mg by mouth daily. Medications patient taking as of now reconciled against medications ordered at time of hospital discharge: Yes    A comprehensive review of systems was negative except for what was noted in the HPI.     Objective:    /80 (Site: Right Upper Arm, Position: Sitting, Cuff Size: Large Adult)   Pulse 100   Ht 5' 7\" (1.702 m)   Wt 300 lb (136.1 kg)   LMP 01/24/2023   SpO2 98%   BMI 46.99 kg/m²   General Appearance: alert and oriented to person, place and time, well-developed and well-nourished, in no acute distress  Skin: warm and dry, no rash or erythema and laparoscopic incisions to abdomen healing well  Head: normocephalic and atraumatic  Pulmonary/Chest: clear to auscultation bilaterally- no wheezes, rales or rhonchi, normal air movement, no respiratory distress  Cardiovascular: normal rate, normal S1 and S2, no gallops, intact distal pulses, and no carotid bruits  Abdomen: soft, non-tender, non-distended, normal bowel sounds, no masses or organomegaly  Extremities: no cyanosis and no clubbing  Musculoskeletal: normal range of motion, no joint swelling, deformity or tenderness      An electronic signature was used to authenticate this note.   --Jennifer Strickland, APRN - CNP

## 2023-02-03 NOTE — PROGRESS NOTES
Post-Discharge Transitional Care  Follow Up      Irina Patrick   YOB: 1975    Date of Office Visit:  2/3/2023  Date of Hospital Admission: 1/24/23  Date of Hospital Discharge: 1/29/23  Risk of hospital readmission (high >=14%. Medium >=10%) :Readmission Risk Score: 9.3      Care management risk score Rising risk (score 2-5) and Complex Care (Scores >=6): No Risk Score On File     Non face to face  following discharge, date last encounter closed (first attempt may have been earlier): 02/02/2023    Call initiated 2 business days of discharge: Yes    ASSESSMENT/PLAN:   Hospital discharge follow-up  -     [place] DISCHARGE MEDS RECONCILED W/ CURRENT OUTPATIENT MED LIST    Medical Decision Making: moderate complexity  No follow-ups on file. {Time Documentation Optional:810074507}       Subjective:   HPI:  Follow up of Hospital problems/diagnosis(es): ***    Inpatient course: Discharge summary reviewed- see chart. Interval history/Current status: ***    Patient Active Problem List   Diagnosis    Seborrhea    Dysfunctional uterine bleeding    Candidal intertrigo    Urticaria, idiopathic    Low back pain    Allergic rhinitis    Asthma- mild persistent    GERD (gastroesophageal reflux disease)    Cholinergic urticaria    Hiatal hernia    Obesity, Class III, BMI 40-49.9 (morbid obesity) ([address]    Obstructive apnea- CPAP    Hyperlipidemia LDL goal <100    Type 2 diabetes mellitus with diabetic nephropathy (HCC)    Irritable bowel syndrome with diarrhea    History of colon polyps    Anxiety    Microcytic anemia    RLS (restless legs syndrome)    Hypertension, essential    Sepsis ([address]    Abdominal pain    DM2 (diabetes mellitus, type 2) ([address]    Calculus of gallbladder without cholecystitis without obstruction       Medications listed as ordered at the time of discharge from hospital     Medication List            Accurate as of February 3, 2023  1:25 PM. If you have any questions, ask your nurse or doctor. CHANGE how you take these medications      Flovent Diskus 100 MCG/ACT Aepb  Generic drug: Fluticasone Propionate (Inhal)  INHALE 2 PUFFS INTO THE LUNGS DAILY  What changed: See the new instructions. CONTINUE taking these medications      Accu-Chek Softclix Lancets Misc  Test twice daily     amoxicillin-clavulanate 875-125 MG per tablet  Commonly known as: AUGMENTIN  Take 1 tablet by mouth 2 times daily for 5 days     blood glucose monitor kit and supplies  Dispense sufficient amount for indicated testing frequency plus additional to accommodate PRN testing needs. Dispense all needed supplies to include: monitor, strips, lancing device, lancets, control solutions, alcohol swabs. ferrous sulfate 325 (65 Fe) MG tablet  Commonly known as: IRON 325     ibuprofen 600 MG tablet  Commonly known as: ADVIL;MOTRIN  Take 1 tablet by mouth every 8 (eight) hours     loratadine 10 MG tablet  Commonly known as: CLARITIN     losartan 50 MG tablet  Commonly known as: COZAAR  TAKE 1 TABLET EVERY        MORNING(NEW DOSAGE)     metFORMIN 500 MG extended release tablet  Commonly known as: GLUCOPHAGE-XR  Take 4 tablets daily with breakfast     montelukast 10 MG tablet  Commonly known as: SINGULAIR  Take one tablet daily     Ortho-Novum 7/7/7 (28) 0.5/0.75/1-35 MG-MCG per tablet  Generic drug: norethindrone-ethinyl estradiol  Take 1 tablet by mouth daily     oxyCODONE 5 MG immediate release tablet  Commonly known as: ROXICODONE  Take 1 tablet by mouth every 6 hours as needed for Pain for up to 5 days.  Max Daily Amount: 20 mg     pantoprazole 40 MG tablet  Commonly known as: PROTONIX  TAKE 1 TABLET BY MOUTH TWICE DAILY     sertraline 50 MG tablet  Commonly known as: ZOLOFT  Take one tablet daily     Trulicity 1.5 JK/9.2MD SC injection  Generic drug: dulaglutide  ADMINISTER 1.5 MG UNDER THE SKIN 1 TIME A WEEK                Medications marked \"taking\" at this time  Outpatient Medications Marked as Taking for the 2/3/23 encounter (Office Visit) with Kisha Nascimento, OC - CNP   Medication Sig Dispense Refill    ibuprofen (ADVIL;MOTRIN) 600 MG tablet Take 1 tablet by mouth every 8 (eight) hours 90 tablet 0    amoxicillin-clavulanate (AUGMENTIN) 875-125 MG per tablet Take 1 tablet by mouth 2 times daily for 5 days 10 tablet 0    ferrous sulfate (IRON 325) 325 (65 Fe) MG tablet Take 325 mg by mouth in the morning and at bedtime      losartan (COZAAR) 50 MG tablet TAKE 1 TABLET EVERY        MORNING(NEW DOSAGE) 90 tablet 1    Accu-Chek Softclix Lancets MISC Test twice daily 200 each 3    blood glucose monitor kit and supplies Dispense sufficient amount for indicated testing frequency plus additional to accommodate PRN testing needs. Dispense all needed supplies to include: monitor, strips, lancing device, lancets, control solutions, alcohol swabs. 1 kit 0    dulaglutide (TRULICITY) 1.5 WD/1.5OF SC injection ADMINISTER 1.5 MG UNDER THE SKIN 1 TIME A WEEK 2 mL 3    sertraline (ZOLOFT) 50 MG tablet Take one tablet daily 90 tablet 1    montelukast (SINGULAIR) 10 MG tablet Take one tablet daily 90 tablet 1    metFORMIN (GLUCOPHAGE-XR) 500 MG extended release tablet Take 4 tablets daily with breakfast 360 tablet 1    pantoprazole (PROTONIX) 40 MG tablet TAKE 1 TABLET BY MOUTH TWICE DAILY 180 tablet 1    FLOVENT DISKUS 100 MCG/BLIST AEPB inhaler INHALE 2 PUFFS INTO THE LUNGS DAILY (Patient taking differently: Inhale 1 puff into the lungs daily) 60 each 2    norethindrone-ethinyl estradiol (ORTHO-NOVUM 7/7/7, 28,) 0.5/0.75/1-35 MG-MCG per tablet Take 1 tablet by mouth daily 3 packet 3    loratadine (CLARITIN) 10 MG tablet Take 10 mg by mouth daily.             Medications patient taking as of now reconciled against medications ordered at time of hospital discharge: {YES / NO:07401}    {OPTIONAL WHEN USING 81759 - ROS (THIS WILL AUTO-DELETE IF NOT USED) :740266501}    Objective:    /80 (Site: Right Upper Arm, Position: Sitting, Cuff Size: Large Adult)   Pulse 100   Ht 5' 7\" (1.702 m)   Wt 300 lb (136.1 kg)   LMP 01/24/2023   SpO2 98%   BMI 46.99 kg/m²   {GENERAL PHYSICAL EXAM OPTIONAL (Optional):349478920}      An electronic signature was used to authenticate this note.   --Chandni Ham, APRN - CNP

## 2023-02-04 DIAGNOSIS — L50.5 CHOLINERGIC URTICARIA: ICD-10-CM

## 2023-02-04 DIAGNOSIS — F43.22 ADJUSTMENT DISORDER WITH ANXIOUS MOOD: ICD-10-CM

## 2023-02-04 DIAGNOSIS — K21.9 GASTROESOPHAGEAL REFLUX DISEASE WITHOUT ESOPHAGITIS: Chronic | ICD-10-CM

## 2023-02-04 DIAGNOSIS — E11.21 TYPE 2 DIABETES MELLITUS WITH DIABETIC NEPHROPATHY, WITHOUT LONG-TERM CURRENT USE OF INSULIN (HCC): ICD-10-CM

## 2023-02-06 RX ORDER — MONTELUKAST SODIUM 10 MG/1
TABLET ORAL
Qty: 90 TABLET | Refills: 1 | Status: SHIPPED | OUTPATIENT
Start: 2023-02-06

## 2023-02-06 RX ORDER — METFORMIN HYDROCHLORIDE 500 MG/1
TABLET, EXTENDED RELEASE ORAL
Qty: 360 TABLET | Refills: 1 | Status: SHIPPED | OUTPATIENT
Start: 2023-02-06

## 2023-02-09 ENCOUNTER — OFFICE VISIT (OUTPATIENT)
Dept: SURGERY | Age: 48
End: 2023-02-09

## 2023-02-09 VITALS — HEIGHT: 67 IN | WEIGHT: 293 LBS | BODY MASS INDEX: 45.99 KG/M2

## 2023-02-09 DIAGNOSIS — K80.20 CALCULUS OF GALLBLADDER WITHOUT CHOLECYSTITIS WITHOUT OBSTRUCTION: Primary | ICD-10-CM

## 2023-02-09 PROCEDURE — 99024 POSTOP FOLLOW-UP VISIT: CPT | Performed by: SURGERY

## 2023-02-09 NOTE — PROGRESS NOTES
General Cody (:  1975) is a 41 y.o. female,Established patient, here for evaluation of the following chief complaint(s):  Post-Op Check (S/P [address])         ASSESSMENT/PLAN:  1. Calculus of gallbladder without cholecystitis without obstruction    Follow up with me as needed           Subjective   SUBJECTIVE/OBJECTIVE:  HPI  Patient presents s/p Laparoscopic Cholecystectomy with Cholangiogram. Patient is two weeks post op. Pain level is minor. Incision appearance: well healed x 4. Post op complications: none. Pathology report reviewed with patient and showed cholecystitis. Follow up prn. Review of Systems       Objective   Physical Exam       Electronically signed by Hoang Calero MD on 2023 at 9:49 AM        An electronic signature was used to authenticate this note.     --Hoang Calero MD

## 2023-02-21 DIAGNOSIS — E11.21 TYPE 2 DIABETES MELLITUS WITH DIABETIC NEPHROPATHY, WITHOUT LONG-TERM CURRENT USE OF INSULIN (HCC): Primary | ICD-10-CM

## 2023-02-22 RX ORDER — LANCETS 30 GAUGE
1 EACH MISCELLANEOUS 2 TIMES DAILY
Qty: 200 EACH | Refills: 3 | Status: SHIPPED | OUTPATIENT
Start: 2023-02-22

## 2023-02-22 RX ORDER — GLUCOSAMINE HCL/CHONDROITIN SU 500-400 MG
CAPSULE ORAL
Qty: 200 STRIP | Refills: 3 | Status: SHIPPED | OUTPATIENT
Start: 2023-02-22

## 2023-03-28 ENCOUNTER — OFFICE VISIT (OUTPATIENT)
Dept: FAMILY MEDICINE CLINIC | Age: 48
End: 2023-03-28
Payer: COMMERCIAL

## 2023-03-28 VITALS
HEART RATE: 137 BPM | TEMPERATURE: 99.6 F | SYSTOLIC BLOOD PRESSURE: 118 MMHG | DIASTOLIC BLOOD PRESSURE: 82 MMHG | HEIGHT: 67 IN | WEIGHT: 293 LBS | OXYGEN SATURATION: 97 % | BODY MASS INDEX: 45.99 KG/M2

## 2023-03-28 DIAGNOSIS — U07.1 COVID-19: Primary | ICD-10-CM

## 2023-03-28 DIAGNOSIS — F41.9 ANXIETY: ICD-10-CM

## 2023-03-28 DIAGNOSIS — I10 HYPERTENSION, ESSENTIAL: ICD-10-CM

## 2023-03-28 DIAGNOSIS — E78.5 HYPERLIPIDEMIA LDL GOAL <100: ICD-10-CM

## 2023-03-28 DIAGNOSIS — J45.20 MILD INTERMITTENT ASTHMA WITHOUT COMPLICATION: Chronic | ICD-10-CM

## 2023-03-28 DIAGNOSIS — G47.33 OBSTRUCTIVE APNEA: Chronic | ICD-10-CM

## 2023-03-28 DIAGNOSIS — E11.21 TYPE 2 DIABETES MELLITUS WITH DIABETIC NEPHROPATHY, WITHOUT LONG-TERM CURRENT USE OF INSULIN (HCC): Chronic | ICD-10-CM

## 2023-03-28 DIAGNOSIS — K21.00 GASTROESOPHAGEAL REFLUX DISEASE WITH ESOPHAGITIS WITHOUT HEMORRHAGE: Chronic | ICD-10-CM

## 2023-03-28 LAB — HBA1C MFR BLD: 7 %

## 2023-03-28 PROCEDURE — 3051F HG A1C>EQUAL 7.0%<8.0%: CPT

## 2023-03-28 PROCEDURE — 3078F DIAST BP <80 MM HG: CPT

## 2023-03-28 PROCEDURE — 99214 OFFICE O/P EST MOD 30 MIN: CPT

## 2023-03-28 PROCEDURE — 83036 HEMOGLOBIN GLYCOSYLATED A1C: CPT

## 2023-03-28 PROCEDURE — 3074F SYST BP LT 130 MM HG: CPT

## 2023-03-28 NOTE — LETTER
March 28, 2023       Mery Gillespie YOB: 1975   [id number] Date of Visit:  3/28/2023       To Whom It May Concern:    Mery Gillespie was seen in my clinic on 3/28/2023. She will be off work from Tuesday 3/28/23 through Friday 3/31/23. If you have any questions or concerns, please don't hesitate to call.     Sincerely,        Apollo Reynoso, APRN - CNP

## 2023-03-28 NOTE — PROGRESS NOTES
3/28/2023    This is a 41 y.o. female   Chief Complaint   Patient presents with    Fever     Fever, body aches, chest congestion, sinus drainage, nausea - since Sunday. Was exposed on Sunday, found out last night. Home test was positive this afternoon   . HPI  Started with fever, body aches, chest congestion, sinus drainage and nausea on Sunday  Was exposed to someone that was COVID positive- home test today was positive  Having some SOB- using flovent BID and albuterol PRN, home SpO2 has been between 94% and 97%. Diabetes Mellitus Type 2: Current symptoms/problems include none. Home blood sugar records: fasting range: 140s  Any episodes of hypoglycemia? no  Eye exam current (within one year): yes  Tobacco history: She  reports that she has never smoked. She has never used smokeless tobacco.     Hypertension:  Home blood pressure monitoring: No.  She is adherent to a low sodium diet. Patient denies chest pain, shortness of breath, headache, lightheadedness, blurred vision, peripheral edema, palpitations, dry cough, and fatigue. Antihypertensive medication side effects: no medication side effects noted. Use of agents associated with hypertension: none.      Lab Results   Component Value Date    LABA1C 7.0 03/28/2023    LABA1C 6.9 01/25/2023    LABA1C 7.0 11/30/2022     Lab Results   Component Value Date    LABMICR YES 01/24/2023    CREATININE 0.5 (L) 01/29/2023     Lab Results   Component Value Date    ALT 14 01/29/2023    AST 19 01/29/2023     Lab Results   Component Value Date    CHOL 189 04/06/2022    TRIG 205 (H) 04/06/2022    HDL 69 (H) 04/06/2022    LDLCALC 79 04/06/2022        GERD: well controlled on PPI    Mood: good on sertraline     Patient Active Problem List   Diagnosis    Seborrhea    Dysfunctional uterine bleeding    Candidal intertrigo    Urticaria, idiopathic    Low back pain    Allergic rhinitis    Asthma- mild persistent    GERD (gastroesophageal reflux disease)    Cholinergic urticaria

## 2023-03-29 ENCOUNTER — PATIENT MESSAGE (OUTPATIENT)
Dept: FAMILY MEDICINE CLINIC | Age: 48
End: 2023-03-29

## 2023-03-29 DIAGNOSIS — J30.1 ALLERGIC RHINITIS DUE TO POLLEN, UNSPECIFIED SEASONALITY: Primary | ICD-10-CM

## 2023-03-29 RX ORDER — CLEMASTINE FUMARATE 1.34 MG
1.34 TABLET ORAL DAILY
Qty: 60 TABLET | Refills: 0 | Status: SHIPPED | OUTPATIENT
Start: 2023-03-29

## 2023-03-29 ASSESSMENT — ENCOUNTER SYMPTOMS
ABDOMINAL PAIN: 0
SHORTNESS OF BREATH: 1
COUGH: 1
NAUSEA: 1
WHEEZING: 0
SINUS PRESSURE: 1

## 2023-03-29 NOTE — TELEPHONE ENCOUNTER
From: Amanuel Hardy  To: Ginger Mittal  Sent: 3/29/2023 11:06 AM EDT  Subject: Rxs    Hi Swetha Gutierrez, the paxlovid seems to be helping, yay! But this drippy nose is making me crazy, and they've stopped selling my go-to OTC antihistamine for colds, Tavist. Could I get a prescription for clemastine fumarate? It looks like I used to take 1.34mg, but if it's only available in 2.68 and is tablets, I could break them. If this is ok, please send to [facility].  (Also, per our conversation yesterday, my pharmacist nephew tells me Tamiflu is completely different from paxlovid, so allergies to one don't mean a higher risk of allergies to the other.)

## 2023-03-30 RX ORDER — BENZONATATE 200 MG/1
200 CAPSULE ORAL 3 TIMES DAILY PRN
Qty: 30 CAPSULE | Refills: 0 | Status: SHIPPED | OUTPATIENT
Start: 2023-03-30 | End: 2023-04-06

## 2023-05-14 DIAGNOSIS — N93.8 DUB (DYSFUNCTIONAL UTERINE BLEEDING): ICD-10-CM

## 2023-05-15 DIAGNOSIS — N93.8 DUB (DYSFUNCTIONAL UTERINE BLEEDING): ICD-10-CM

## 2023-05-15 RX ORDER — NORETHINDRONE AND ETHINYL ESTRADIOL 7 DAYS X 3
KIT ORAL
Qty: 84 TABLET | Refills: 1 | OUTPATIENT
Start: 2023-05-15

## 2023-05-15 RX ORDER — NORETHINDRONE AND ETHINYL ESTRADIOL 7 DAYS X 3
KIT ORAL
Qty: 84 TABLET | Refills: 0 | Status: SHIPPED | OUTPATIENT
Start: 2023-05-15

## 2023-05-16 RX ORDER — NORETHINDRONE AND ETHINYL ESTRADIOL 7 DAYS X 3
1 KIT ORAL DAILY
Qty: 3 PACKET | Refills: 3 | OUTPATIENT
Start: 2023-05-16

## 2023-05-23 DIAGNOSIS — E11.21 TYPE 2 DIABETES MELLITUS WITH DIABETIC NEPHROPATHY, WITHOUT LONG-TERM CURRENT USE OF INSULIN (HCC): Chronic | ICD-10-CM

## 2023-05-23 DIAGNOSIS — E78.5 HYPERLIPIDEMIA LDL GOAL <100: ICD-10-CM

## 2023-05-23 DIAGNOSIS — I10 HYPERTENSION, ESSENTIAL: ICD-10-CM

## 2023-05-23 LAB
ALBUMIN SERPL-MCNC: 4 G/DL (ref 3.4–5)
ALBUMIN/GLOB SERPL: 1.3 {RATIO} (ref 1.1–2.2)
ALP SERPL-CCNC: 122 U/L (ref 40–129)
ALT SERPL-CCNC: 20 U/L (ref 10–40)
ANION GAP SERPL CALCULATED.3IONS-SCNC: 12 MMOL/L (ref 3–16)
AST SERPL-CCNC: 19 U/L (ref 15–37)
BILIRUB SERPL-MCNC: 0.4 MG/DL (ref 0–1)
BUN SERPL-MCNC: 14 MG/DL (ref 7–20)
CALCIUM SERPL-MCNC: 9.3 MG/DL (ref 8.3–10.6)
CHLORIDE SERPL-SCNC: 103 MMOL/L (ref 99–110)
CHOLEST SERPL-MCNC: 208 MG/DL (ref 0–199)
CO2 SERPL-SCNC: 23 MMOL/L (ref 21–32)
CREAT SERPL-MCNC: 0.7 MG/DL (ref 0.6–1.1)
CREAT UR-MCNC: 353.1 MG/DL (ref 28–259)
GFR SERPLBLD CREATININE-BSD FMLA CKD-EPI: >60 ML/MIN/{1.73_M2}
GLUCOSE SERPL-MCNC: 116 MG/DL (ref 70–99)
HDLC SERPL-MCNC: 67 MG/DL (ref 40–60)
LDLC SERPL CALC-MCNC: 106 MG/DL
MICROALBUMIN UR DL<=1MG/L-MCNC: 6.2 MG/DL
MICROALBUMIN/CREAT UR: 17.6 MG/G (ref 0–30)
POTASSIUM SERPL-SCNC: 4.5 MMOL/L (ref 3.5–5.1)
PROT SERPL-MCNC: 7.1 G/DL (ref 6.4–8.2)
SODIUM SERPL-SCNC: 138 MMOL/L (ref 136–145)
TRIGL SERPL-MCNC: 176 MG/DL (ref 0–150)
VLDLC SERPL CALC-MCNC: 35 MG/DL

## 2023-07-03 DIAGNOSIS — E11.21 TYPE 2 DIABETES MELLITUS WITH DIABETIC NEPHROPATHY, WITHOUT LONG-TERM CURRENT USE OF INSULIN (HCC): ICD-10-CM

## 2023-07-03 DIAGNOSIS — K21.9 GASTROESOPHAGEAL REFLUX DISEASE WITHOUT ESOPHAGITIS: Chronic | ICD-10-CM

## 2023-07-03 DIAGNOSIS — I10 HYPERTENSION, ESSENTIAL: ICD-10-CM

## 2023-07-03 DIAGNOSIS — L50.5 CHOLINERGIC URTICARIA: ICD-10-CM

## 2023-07-03 DIAGNOSIS — F43.22 ADJUSTMENT DISORDER WITH ANXIOUS MOOD: ICD-10-CM

## 2023-07-03 DIAGNOSIS — E11.21 DIABETIC NEPHROPATHY WITH PROTEINURIA (HCC): ICD-10-CM

## 2023-07-03 DIAGNOSIS — N93.8 DUB (DYSFUNCTIONAL UTERINE BLEEDING): ICD-10-CM

## 2023-07-03 RX ORDER — LOSARTAN POTASSIUM 50 MG/1
TABLET ORAL
Qty: 90 TABLET | Refills: 1 | Status: SHIPPED | OUTPATIENT
Start: 2023-07-03

## 2023-07-03 RX ORDER — METFORMIN HYDROCHLORIDE 500 MG/1
TABLET, EXTENDED RELEASE ORAL
Qty: 360 TABLET | Refills: 1 | Status: SHIPPED | OUTPATIENT
Start: 2023-07-03

## 2023-07-03 RX ORDER — NORETHINDRONE AND ETHINYL ESTRADIOL 7 DAYS X 3
1 KIT ORAL DAILY
Qty: 84 TABLET | Refills: 0 | Status: SHIPPED | OUTPATIENT
Start: 2023-07-03

## 2023-07-03 RX ORDER — MONTELUKAST SODIUM 10 MG/1
TABLET ORAL
Qty: 90 TABLET | Refills: 1 | Status: SHIPPED | OUTPATIENT
Start: 2023-07-03

## 2023-07-13 DIAGNOSIS — E11.21 TYPE 2 DIABETES MELLITUS WITH DIABETIC NEPHROPATHY, WITHOUT LONG-TERM CURRENT USE OF INSULIN (HCC): Chronic | ICD-10-CM

## 2023-07-13 NOTE — TELEPHONE ENCOUNTER
Jayne Hooper from [address] [place] [address] called stating that this patient is switching to this mail order pharmacy for her Blenda Marli said this is covered through the patient's insurance for a 80 day supply    The pharmacy was not found when I searched for it     [address] [place] [address] [phone]    Please Advise

## 2023-07-14 DIAGNOSIS — E11.21 TYPE 2 DIABETES MELLITUS WITH DIABETIC NEPHROPATHY, WITHOUT LONG-TERM CURRENT USE OF INSULIN (HCC): Chronic | ICD-10-CM

## 2023-07-14 NOTE — TELEPHONE ENCOUNTER
1- please confirm pharmacy (see other message)  2- would she like to increase 3.0?       Surya Chandler from [facility] called stating that this patient is switching to this mail order pharmacy for her Jason Ivone said this is covered through the patient's insurance for a 80 day supply     The pharmacy was not found when I searched for it      [facility] [phone]     Please Advise

## 2023-07-14 NOTE — TELEPHONE ENCOUNTER
I called Dino Bonny, she said she wanted the same dose sent. Did not want to increase at this time. I called the [address] [place] [address], they are located in [facility] and can not receive E-scripts. We can fax a 90 day script to them at [phone]. Can you sign a script to print. Pended this for print, so we can fax it. Thanks.

## 2023-07-18 ENCOUNTER — OFFICE VISIT (OUTPATIENT)
Dept: FAMILY MEDICINE CLINIC | Age: 48
End: 2023-07-18
Payer: COMMERCIAL

## 2023-07-18 VITALS
BODY MASS INDEX: 45.99 KG/M2 | SYSTOLIC BLOOD PRESSURE: 124 MMHG | WEIGHT: 293 LBS | OXYGEN SATURATION: 96 % | HEIGHT: 67 IN | DIASTOLIC BLOOD PRESSURE: 86 MMHG | HEART RATE: 105 BPM

## 2023-07-18 DIAGNOSIS — G25.81 RLS (RESTLESS LEGS SYNDROME): ICD-10-CM

## 2023-07-18 DIAGNOSIS — G47.33 OBSTRUCTIVE APNEA: Chronic | ICD-10-CM

## 2023-07-18 DIAGNOSIS — I10 HYPERTENSION, ESSENTIAL: ICD-10-CM

## 2023-07-18 DIAGNOSIS — M21.171 ACQUIRED VARUS DEFORMITY OF RIGHT ANKLE: ICD-10-CM

## 2023-07-18 DIAGNOSIS — J45.20 MILD INTERMITTENT ASTHMA WITHOUT COMPLICATION: Chronic | ICD-10-CM

## 2023-07-18 DIAGNOSIS — Z01.818 PRE-OP EXAM: Primary | ICD-10-CM

## 2023-07-18 DIAGNOSIS — E11.21 TYPE 2 DIABETES MELLITUS WITH DIABETIC NEPHROPATHY, WITHOUT LONG-TERM CURRENT USE OF INSULIN (HCC): Chronic | ICD-10-CM

## 2023-07-18 DIAGNOSIS — S86.311S TEAR OF PERONEAL TENDON, RIGHT, SEQUELA: ICD-10-CM

## 2023-07-18 LAB — HBA1C MFR BLD: 6.5 %

## 2023-07-18 PROCEDURE — 99214 OFFICE O/P EST MOD 30 MIN: CPT

## 2023-07-18 PROCEDURE — 93000 ELECTROCARDIOGRAM COMPLETE: CPT

## 2023-07-18 PROCEDURE — 83037 HB GLYCOSYLATED A1C HOME DEV: CPT

## 2023-07-18 PROCEDURE — 3074F SYST BP LT 130 MM HG: CPT

## 2023-07-18 PROCEDURE — 3078F DIAST BP <80 MM HG: CPT

## 2023-07-18 RX ORDER — AMOXICILLIN 500 MG/1
TABLET, FILM COATED ORAL
COMMUNITY
Start: 2023-07-14

## 2023-07-18 NOTE — PROGRESS NOTES
Preoperative Consultation      Hellen Kimball  YOB: 1975    Date of Service:  7/18/2023    Vitals:    07/18/23 1606   BP: 124/86   Site: Right Upper Arm   Position: Sitting   Cuff Size: Large Adult   Pulse: (!) 105   SpO2: 96%   Weight: (!) 314 lb (142.4 kg)   Height: 5' 7\" (1.702 m)      Wt Readings from Last 2 Encounters:   07/18/23 (!) 314 lb (142.4 kg)   03/28/23 300 lb (136.1 kg)     BP Readings from Last 3 Encounters:   07/18/23 124/86   03/28/23 118/82   02/03/23 108/80        Chief Complaint   Patient presents with    Pre-op Exam     Right ankle surgery Dr. Tammi Patricio at [facility] 8/14/23     Allergies   Allergen Reactions    Fluoxetine Other (See Comments)     Overtim, head ache    Lisinopril Other (See Comments)     cough    Oseltamivir Phosphate Other (See Comments)    Lexapro [Escitalopram Oxalate] Other (See Comments)     sleepy    Methylisothiazolinone Itching and Rash    Sulfa Antibiotics Rash     Outpatient Medications Marked as Taking for the 7/18/23 encounter (Office Visit) with OC Knowles CNP   Medication Sig Dispense Refill    amoxicillin (AMOXIL) 500 MG tablet TAKE EVERY TABLET THREE TIMES DAILY UNTIL FINISHED      dulaglutide (TRULICITY) 1.5 GN/7.1ES SC injection Inject 0.5 mLs into the skin once a week 12 Adjustable Dose Pre-filled Pen Syringe 1    losartan (COZAAR) 50 MG tablet TAKE 1 TABLET EVERY MORNING 90 tablet 1    sertraline (ZOLOFT) 50 MG tablet Take one tablet daily 90 tablet 1    montelukast (SINGULAIR) 10 MG tablet Take one tablet daily 90 tablet 1    metFORMIN (GLUCOPHAGE-XR) 500 MG extended release tablet Take 4 tablets daily with breakfast 360 tablet 1    norethindrone-ethinyl estradiol (NORTREL 7/7/7) 0.5/0.75/1-35 MG-MCG per tablet Take 1 tablet by mouth daily 84 tablet 0    Clemastine Fumarate (DAYHIST) 1.34 MG TABS tablet Take 1 tablet by mouth daily 60 tablet 0    blood glucose monitor strips Test 2 times a day & as needed for symptoms

## 2023-07-20 DIAGNOSIS — G25.81 RLS (RESTLESS LEGS SYNDROME): ICD-10-CM

## 2023-07-20 DIAGNOSIS — Z01.818 PRE-OP EXAM: ICD-10-CM

## 2023-07-20 LAB
BASOPHILS # BLD: 0 K/UL (ref 0–0.2)
BASOPHILS NFR BLD: 0.4 %
DEPRECATED RDW RBC AUTO: 15 % (ref 12.4–15.4)
EOSINOPHIL # BLD: 0.1 K/UL (ref 0–0.6)
EOSINOPHIL NFR BLD: 0.8 %
HCT VFR BLD AUTO: 38.3 % (ref 36–48)
HGB BLD-MCNC: 12.9 G/DL (ref 12–16)
LYMPHOCYTES # BLD: 3.4 K/UL (ref 1–5.1)
LYMPHOCYTES NFR BLD: 29.4 %
MCH RBC QN AUTO: 29.4 PG (ref 26–34)
MCHC RBC AUTO-ENTMCNC: 33.6 G/DL (ref 31–36)
MCV RBC AUTO: 87.3 FL (ref 80–100)
MONOCYTES # BLD: 0.9 K/UL (ref 0–1.3)
MONOCYTES NFR BLD: 7.4 %
NEUTROPHILS # BLD: 7.2 K/UL (ref 1.7–7.7)
NEUTROPHILS NFR BLD: 62 %
PLATELET # BLD AUTO: 267 K/UL (ref 135–450)
PMV BLD AUTO: 9.3 FL (ref 5–10.5)
RBC # BLD AUTO: 4.38 M/UL (ref 4–5.2)
WBC # BLD AUTO: 11.6 K/UL (ref 4–11)

## 2023-07-21 DIAGNOSIS — D72.829 LEUKOCYTOSIS, UNSPECIFIED TYPE: Primary | ICD-10-CM

## 2023-07-21 LAB
ANION GAP SERPL CALCULATED.3IONS-SCNC: 14 MMOL/L (ref 3–16)
BUN SERPL-MCNC: 12 MG/DL (ref 7–20)
CALCIUM SERPL-MCNC: 9.7 MG/DL (ref 8.3–10.6)
CHLORIDE SERPL-SCNC: 98 MMOL/L (ref 99–110)
CO2 SERPL-SCNC: 24 MMOL/L (ref 21–32)
CREAT SERPL-MCNC: 0.7 MG/DL (ref 0.6–1.1)
FERRITIN SERPL IA-MCNC: 108.5 NG/ML (ref 15–150)
GFR SERPLBLD CREATININE-BSD FMLA CKD-EPI: >60 ML/MIN/{1.73_M2}
GLUCOSE SERPL-MCNC: 143 MG/DL (ref 70–99)
IRON SATN MFR SERPL: 12 % (ref 15–50)
IRON SERPL-MCNC: 51 UG/DL (ref 37–145)
POTASSIUM SERPL-SCNC: 4.7 MMOL/L (ref 3.5–5.1)
SODIUM SERPL-SCNC: 136 MMOL/L (ref 136–145)
TIBC SERPL-MCNC: 415 UG/DL (ref 260–445)

## 2023-08-05 DIAGNOSIS — N93.8 DUB (DYSFUNCTIONAL UTERINE BLEEDING): ICD-10-CM

## 2023-08-07 DIAGNOSIS — D72.829 LEUKOCYTOSIS, UNSPECIFIED TYPE: ICD-10-CM

## 2023-08-07 LAB
BASOPHILS # BLD: 0.1 K/UL (ref 0–0.2)
BASOPHILS NFR BLD: 0.4 %
DEPRECATED RDW RBC AUTO: 15.3 % (ref 12.4–15.4)
EOSINOPHIL # BLD: 0.2 K/UL (ref 0–0.6)
EOSINOPHIL NFR BLD: 2.1 %
HCT VFR BLD AUTO: 42 % (ref 36–48)
HGB BLD-MCNC: 13.8 G/DL (ref 12–16)
LYMPHOCYTES # BLD: 2.7 K/UL (ref 1–5.1)
LYMPHOCYTES NFR BLD: 22.9 %
MCH RBC QN AUTO: 29.1 PG (ref 26–34)
MCHC RBC AUTO-ENTMCNC: 32.8 G/DL (ref 31–36)
MCV RBC AUTO: 88.8 FL (ref 80–100)
MONOCYTES # BLD: 0.6 K/UL (ref 0–1.3)
MONOCYTES NFR BLD: 5.2 %
NEUTROPHILS # BLD: 8.1 K/UL (ref 1.7–7.7)
NEUTROPHILS NFR BLD: 69.4 %
PLATELET # BLD AUTO: 331 K/UL (ref 135–450)
PMV BLD AUTO: 9.7 FL (ref 5–10.5)
RBC # BLD AUTO: 4.73 M/UL (ref 4–5.2)
WBC # BLD AUTO: 11.6 K/UL (ref 4–11)

## 2023-08-07 RX ORDER — NORETHINDRONE AND ETHINYL ESTRADIOL 7 DAYS X 3
KIT ORAL
Qty: 84 TABLET | Refills: 3 | Status: SHIPPED | OUTPATIENT
Start: 2023-08-07 | End: 2024-08-01

## 2023-10-17 DIAGNOSIS — N93.8 DUB (DYSFUNCTIONAL UTERINE BLEEDING): ICD-10-CM

## 2023-10-18 RX ORDER — NORETHINDRONE AND ETHINYL ESTRADIOL 7 DAYS X 3
1 KIT ORAL DAILY
Qty: 84 TABLET | Refills: 3 | Status: SHIPPED | OUTPATIENT
Start: 2023-10-18

## 2023-11-10 ENCOUNTER — TELEPHONE (OUTPATIENT)
Dept: PULMONOLOGY | Age: 48
End: 2023-11-10

## 2023-11-11 ASSESSMENT — SLEEP AND FATIGUE QUESTIONNAIRES
HOW LIKELY ARE YOU TO NOD OFF OR FALL ASLEEP WHILE SITTING AND TALKING TO SOMEONE: WOULD NEVER DOZE
HOW LIKELY ARE YOU TO NOD OFF OR FALL ASLEEP WHILE SITTING AND READING: 2
HOW LIKELY ARE YOU TO NOD OFF OR FALL ASLEEP WHILE SITTING AND READING: MODERATE CHANCE OF DOZING
HOW LIKELY ARE YOU TO NOD OFF OR FALL ASLEEP IN A CAR, WHILE STOPPED FOR A FEW MINUTES IN TRAFFIC: WOULD NEVER DOZE
HOW LIKELY ARE YOU TO NOD OFF OR FALL ASLEEP WHILE WATCHING TV: SLIGHT CHANCE OF DOZING
HOW LIKELY ARE YOU TO NOD OFF OR FALL ASLEEP IN A CAR, WHILE STOPPED FOR A FEW MINUTES IN TRAFFIC: 0
HOW LIKELY ARE YOU TO NOD OFF OR FALL ASLEEP WHILE LYING DOWN TO REST IN THE AFTERNOON WHEN CIRCUMSTANCES PERMIT: 2
ESS TOTAL SCORE: 7
HOW LIKELY ARE YOU TO NOD OFF OR FALL ASLEEP WHEN YOU ARE A PASSENGER IN A CAR FOR AN HOUR WITHOUT A BREAK: 1
HOW LIKELY ARE YOU TO NOD OFF OR FALL ASLEEP WHILE SITTING QUIETLY AFTER LUNCH WITHOUT ALCOHOL: SLIGHT CHANCE OF DOZING
HOW LIKELY ARE YOU TO NOD OFF OR FALL ASLEEP WHILE SITTING QUIETLY AFTER LUNCH WITHOUT ALCOHOL: 1
HOW LIKELY ARE YOU TO NOD OFF OR FALL ASLEEP WHEN YOU ARE A PASSENGER IN A CAR FOR AN HOUR WITHOUT A BREAK: SLIGHT CHANCE OF DOZING
HOW LIKELY ARE YOU TO NOD OFF OR FALL ASLEEP WHILE WATCHING TV: 1
HOW LIKELY ARE YOU TO NOD OFF OR FALL ASLEEP WHILE LYING DOWN TO REST IN THE AFTERNOON WHEN CIRCUMSTANCES PERMIT: MODERATE CHANCE OF DOZING
HOW LIKELY ARE YOU TO NOD OFF OR FALL ASLEEP WHILE SITTING AND TALKING TO SOMEONE: 0
HOW LIKELY ARE YOU TO NOD OFF OR FALL ASLEEP WHILE SITTING INACTIVE IN A PUBLIC PLACE: 0
HOW LIKELY ARE YOU TO NOD OFF OR FALL ASLEEP WHILE SITTING INACTIVE IN A PUBLIC PLACE: WOULD NEVER DOZE

## 2023-11-14 ENCOUNTER — TELEMEDICINE (OUTPATIENT)
Dept: PULMONOLOGY | Age: 48
End: 2023-11-14
Payer: COMMERCIAL

## 2023-11-14 DIAGNOSIS — I10 HYPERTENSION, ESSENTIAL: ICD-10-CM

## 2023-11-14 DIAGNOSIS — E11.21 TYPE 2 DIABETES MELLITUS WITH DIABETIC NEPHROPATHY, WITHOUT LONG-TERM CURRENT USE OF INSULIN (HCC): Chronic | ICD-10-CM

## 2023-11-14 DIAGNOSIS — E66.01 OBESITY, CLASS III, BMI 40-49.9 (MORBID OBESITY) (HCC): Chronic | ICD-10-CM

## 2023-11-14 DIAGNOSIS — G47.33 OBSTRUCTIVE APNEA: Primary | Chronic | ICD-10-CM

## 2023-11-14 PROCEDURE — 3044F HG A1C LEVEL LT 7.0%: CPT | Performed by: NURSE PRACTITIONER

## 2023-11-14 PROCEDURE — 99214 OFFICE O/P EST MOD 30 MIN: CPT | Performed by: NURSE PRACTITIONER

## 2023-11-14 ASSESSMENT — SLEEP AND FATIGUE QUESTIONNAIRES
HOW LIKELY ARE YOU TO NOD OFF OR FALL ASLEEP WHILE SITTING QUIETLY AFTER LUNCH WITHOUT ALCOHOL: 1
HOW LIKELY ARE YOU TO NOD OFF OR FALL ASLEEP WHILE SITTING INACTIVE IN A PUBLIC PLACE: 0
HOW LIKELY ARE YOU TO NOD OFF OR FALL ASLEEP WHILE SITTING AND TALKING TO SOMEONE: 0
HOW LIKELY ARE YOU TO NOD OFF OR FALL ASLEEP WHILE WATCHING TV: 1
HOW LIKELY ARE YOU TO NOD OFF OR FALL ASLEEP WHEN YOU ARE A PASSENGER IN A CAR FOR AN HOUR WITHOUT A BREAK: 1
HOW LIKELY ARE YOU TO NOD OFF OR FALL ASLEEP WHILE LYING DOWN TO REST IN THE AFTERNOON WHEN CIRCUMSTANCES PERMIT: 2
ESS TOTAL SCORE: 7
HOW LIKELY ARE YOU TO NOD OFF OR FALL ASLEEP WHILE SITTING AND READING: 2
HOW LIKELY ARE YOU TO NOD OFF OR FALL ASLEEP IN A CAR, WHILE STOPPED FOR A FEW MINUTES IN TRAFFIC: 0

## 2023-11-14 NOTE — ASSESSMENT & PLAN NOTE
Chronic-Stable: Reviewed and analyzed results of physiologic download from patient's machine and reviewed with patient. Supplies and parts as needed for her machine. These are medically necessary. Limit caffeine use after 3pm. Based on the analyzed data will continue with current settings. Stable on her machine at current settings, getting benefit from the use, and having minimal side effects. Will place order for a new machine. Will wait to send prescription for the new machine until patient contacts the office. She would like to research the [employer] 11 before ordering as she likes her current model of machine. Her machine is currently over 11years old, broken beyond repair, and needs replaced. She is gaining benefit from machine use and machine is medically necessary. Will see her back between 31 and 90 days for new machine compliance. Encouraged her to contact the office with any questions or concerns.

## 2023-11-14 NOTE — PROGRESS NOTES
assist with weight control. Discussed weight gain and/or weight loss may require adjustments to machine settings. Encouraged her to work on weight loss through diet and exercise. Reviewed, analyzed, and documented physiologic data from patient's PAP machine. This information was analyzed to assess complexity and medical decision making in regards to further testing and management. The primary encounter diagnosis was Obstructive apnea- CPAP. Diagnoses of Hypertension, essential, Type 2 diabetes mellitus with diabetic nephropathy, without long-term current use of insulin ([address]), and Obesity, Class III, BMI 40-49.9 (morbid obesity) ([address]) were also pertinent to this visit. The chronic medical conditions listed are directly related to the primary diagnosis listed above. The management of the primary diagnosis affects the secondary diagnosis and vice versa. Subjective:     Patient ID: Linwood Townsend is a 57 y.o. female. Chief Complaint   Patient presents with    Sleep Apnea     Subjective   HPI:    Machine Modem/Download Info:  Compliance (hours/night): 7.96 hrs/night  % of nights >= 4 hrs: 95 %  Download AHI (/hour): 1.1 /HR  Average CPAP Pressure : 12 cmH2O      APAP - Settings  Pressure Min: 9 cmH2O  Pressure Max: 17 cmH2O                 Comfort Settings  Flex/EPR (0-3): 3 PAP Mask  Mask Type: Full Face mask     Linwood Townsend presents today for follow-up for sleep apnea. she reports she is doing well with her machine. Her machine is over 11years old and is no longer transmitting the data remotely. She has also been receiving an error message that the machine his met its motor life expectancy. She would like to have an order placed for a new machine but is unsure if she wants the Airsense 11 as she likes her current model. The pressure on her machine is comfortable and she is waking rested. she denies headaches, congestion, nosebleeds, dryness, aerophagia, or drowsiness while driving.  her mask

## 2023-11-14 NOTE — PROGRESS NOTES
[address]  1975  [address]  [phone] (home)   [phone] (mobile)      Insurance Info (confirm with patient if correct):  Payer/Plan Subscr  Sex Relation Sub.  Ins. ID Effective Group Num

## 2023-11-14 NOTE — ASSESSMENT & PLAN NOTE
Chronic-not stable:  Discussed importance of treating obstructive sleep apnea and getting sufficient sleep to assist with weight control. Discussed weight gain and/or weight loss may require adjustments to machine settings. Encouraged her to work on weight loss through diet and exercise.

## 2023-11-27 ENCOUNTER — OFFICE VISIT (OUTPATIENT)
Dept: FAMILY MEDICINE CLINIC | Age: 48
End: 2023-11-27
Payer: COMMERCIAL

## 2023-11-27 VITALS
HEIGHT: 67 IN | SYSTOLIC BLOOD PRESSURE: 120 MMHG | OXYGEN SATURATION: 98 % | WEIGHT: 293 LBS | DIASTOLIC BLOOD PRESSURE: 86 MMHG | HEART RATE: 102 BPM | BODY MASS INDEX: 45.99 KG/M2

## 2023-11-27 DIAGNOSIS — D50.9 MICROCYTIC ANEMIA: ICD-10-CM

## 2023-11-27 DIAGNOSIS — I10 HYPERTENSION, ESSENTIAL: ICD-10-CM

## 2023-11-27 DIAGNOSIS — L30.4 INTERTRIGO: ICD-10-CM

## 2023-11-27 DIAGNOSIS — E11.21 TYPE 2 DIABETES MELLITUS WITH DIABETIC NEPHROPATHY, WITHOUT LONG-TERM CURRENT USE OF INSULIN (HCC): Primary | Chronic | ICD-10-CM

## 2023-11-27 DIAGNOSIS — L71.9 ROSACEA: ICD-10-CM

## 2023-11-27 DIAGNOSIS — E78.5 HYPERLIPIDEMIA LDL GOAL <100: ICD-10-CM

## 2023-11-27 DIAGNOSIS — G25.81 RLS (RESTLESS LEGS SYNDROME): ICD-10-CM

## 2023-11-27 DIAGNOSIS — R20.2 PARESTHESIA OF LEFT UPPER EXTREMITY: ICD-10-CM

## 2023-11-27 DIAGNOSIS — K21.00 GASTROESOPHAGEAL REFLUX DISEASE WITH ESOPHAGITIS WITHOUT HEMORRHAGE: Chronic | ICD-10-CM

## 2023-11-27 LAB — HBA1C MFR BLD: 6.8 %

## 2023-11-27 PROCEDURE — 99214 OFFICE O/P EST MOD 30 MIN: CPT

## 2023-11-27 PROCEDURE — 83036 HEMOGLOBIN GLYCOSYLATED A1C: CPT

## 2023-11-27 PROCEDURE — 3074F SYST BP LT 130 MM HG: CPT

## 2023-11-27 PROCEDURE — 3044F HG A1C LEVEL LT 7.0%: CPT

## 2023-11-27 PROCEDURE — 3079F DIAST BP 80-89 MM HG: CPT

## 2023-11-27 RX ORDER — PIMECROLIMUS 10 MG/G
CREAM TOPICAL
Qty: 30 G | Refills: 5 | Status: SHIPPED | OUTPATIENT
Start: 2023-11-27

## 2023-11-27 RX ORDER — CLOTRIMAZOLE AND BETAMETHASONE DIPROPIONATE 10; .64 MG/G; MG/G
CREAM TOPICAL
Qty: 60 G | Refills: 5 | Status: SHIPPED | OUTPATIENT
Start: 2023-11-27

## 2023-11-27 ASSESSMENT — ENCOUNTER SYMPTOMS
SHORTNESS OF BREATH: 0
ABDOMINAL PAIN: 0

## 2023-11-27 NOTE — PROGRESS NOTES
11/27/2023    This is a 57 y.o. female   Chief Complaint   Patient presents with    Diabetes     Dm check   . HPI    Doing well- getting better from surgery    RLS is worse- taking iron supplement, wants to check her B12    Having strange temperature sensation in her left hand/arm- when she goes into cold, it feels very hot, no weakness, no tingling, no pain    Using saeed call [facility]iel[facility] for IBS- hypno therapy; pain is 80% better    Wanting some topicals that were prescribed by derm to be prescribed by us [facility] for her rosacea and lotrisone for her intertrigo    Diabetes Mellitus Type 2: Current symptoms/problems include none and neuropathy. Home blood sugar records: patient does not test  Any episodes of hypoglycemia? no  Eye exam current (within one year): yes  Tobacco history: She  reports that she has never smoked. She has never been exposed to tobacco smoke. She has never used smokeless tobacco.     Hypertension:  Home blood pressure monitoring: No.  She is not adherent to a low sodium diet. Patient denies chest pain, shortness of breath, headache, lightheadedness, blurred vision, peripheral edema, palpitations, dry cough, and fatigue. Antihypertensive medication side effects: no medication side effects noted. Use of agents associated with hypertension: none.      Hyperlipidemia:  Not on medications    Lab Results   Component Value Date    LABA1C 6.8 11/27/2023    LABA1C 6.5 07/18/2023    LABA1C 7.0 03/28/2023     Lab Results   Component Value Date    CREATININE 0.7 07/20/2023     Lab Results   Component Value Date    ALT 20 05/23/2023    AST 19 05/23/2023     Lab Results   Component Value Date    CHOL 208 (H) 05/23/2023    TRIG 176 (H) 05/23/2023    HDL 67 (H) 05/23/2023    LDLCALC 106 (H) 05/23/2023           Patient Active Problem List   Diagnosis    Seborrhea    Dysfunctional uterine bleeding    Candidal intertrigo    Urticaria, idiopathic    Low back pain    Allergic rhinitis    Asthma- mild persistent

## 2023-11-28 LAB
ALBUMIN SERPL-MCNC: 4 G/DL (ref 3.4–5)
ALBUMIN/GLOB SERPL: 1.2 {RATIO} (ref 1.1–2.2)
ALP SERPL-CCNC: 129 U/L (ref 40–129)
ALT SERPL-CCNC: 39 U/L (ref 10–40)
ANION GAP SERPL CALCULATED.3IONS-SCNC: 13 MMOL/L (ref 3–16)
AST SERPL-CCNC: 49 U/L (ref 15–37)
BASOPHILS # BLD: 0.1 K/UL (ref 0–0.2)
BASOPHILS NFR BLD: 0.7 %
BILIRUB SERPL-MCNC: 0.4 MG/DL (ref 0–1)
BUN SERPL-MCNC: 17 MG/DL (ref 7–20)
CALCIUM SERPL-MCNC: 9.7 MG/DL (ref 8.3–10.6)
CHLORIDE SERPL-SCNC: 102 MMOL/L (ref 99–110)
CO2 SERPL-SCNC: 24 MMOL/L (ref 21–32)
CREAT SERPL-MCNC: 0.7 MG/DL (ref 0.6–1.1)
DEPRECATED RDW RBC AUTO: 14.5 % (ref 12.4–15.4)
EOSINOPHIL # BLD: 0.1 K/UL (ref 0–0.6)
EOSINOPHIL NFR BLD: 0.9 %
GFR SERPLBLD CREATININE-BSD FMLA CKD-EPI: >60 ML/MIN/{1.73_M2}
GLUCOSE SERPL-MCNC: 179 MG/DL (ref 70–99)
HCT VFR BLD AUTO: 40.9 % (ref 36–48)
HGB BLD-MCNC: 13.8 G/DL (ref 12–16)
LYMPHOCYTES # BLD: 2.9 K/UL (ref 1–5.1)
LYMPHOCYTES NFR BLD: 23.9 %
MAGNESIUM SERPL-MCNC: 1.9 MG/DL (ref 1.8–2.4)
MCH RBC QN AUTO: 29.7 PG (ref 26–34)
MCHC RBC AUTO-ENTMCNC: 33.8 G/DL (ref 31–36)
MCV RBC AUTO: 87.9 FL (ref 80–100)
MONOCYTES # BLD: 0.6 K/UL (ref 0–1.3)
MONOCYTES NFR BLD: 5.3 %
NEUTROPHILS # BLD: 8.4 K/UL (ref 1.7–7.7)
NEUTROPHILS NFR BLD: 69.2 %
PATH INTERP BLD-IMP: NO
PLATELET # BLD AUTO: 299 K/UL (ref 135–450)
PLATELET BLD QL SMEAR: ADEQUATE
PMV BLD AUTO: 10.3 FL (ref 5–10.5)
POTASSIUM SERPL-SCNC: 4.5 MMOL/L (ref 3.5–5.1)
PROT SERPL-MCNC: 7.4 G/DL (ref 6.4–8.2)
RBC # BLD AUTO: 4.65 M/UL (ref 4–5.2)
SLIDE REVIEW: ABNORMAL
SODIUM SERPL-SCNC: 139 MMOL/L (ref 136–145)
TSH SERPL DL<=0.005 MIU/L-ACNC: 1.93 UIU/ML (ref 0.27–4.2)
VIT B12 SERPL-MCNC: 481 PG/ML (ref 211–911)
WBC # BLD AUTO: 12.2 K/UL (ref 4–11)

## 2023-11-29 PROBLEM — R74.01 ELEVATED AST (SGOT): Status: ACTIVE | Noted: 2023-11-29

## 2023-12-25 DIAGNOSIS — J45.21 MILD INTERMITTENT ASTHMA WITH ACUTE EXACERBATION: Chronic | ICD-10-CM

## 2024-01-02 ENCOUNTER — OFFICE VISIT (OUTPATIENT)
Dept: FAMILY MEDICINE CLINIC | Age: 49
End: 2024-01-02
Payer: COMMERCIAL

## 2024-01-02 VITALS
TEMPERATURE: 98.2 F | WEIGHT: 293 LBS | HEART RATE: 100 BPM | HEIGHT: 67 IN | DIASTOLIC BLOOD PRESSURE: 80 MMHG | SYSTOLIC BLOOD PRESSURE: 120 MMHG | BODY MASS INDEX: 45.99 KG/M2 | OXYGEN SATURATION: 98 %

## 2024-01-02 DIAGNOSIS — J06.9 URI WITH COUGH AND CONGESTION: Primary | ICD-10-CM

## 2024-01-02 PROCEDURE — 99213 OFFICE O/P EST LOW 20 MIN: CPT

## 2024-01-02 PROCEDURE — 3074F SYST BP LT 130 MM HG: CPT

## 2024-01-02 PROCEDURE — 3079F DIAST BP 80-89 MM HG: CPT

## 2024-01-02 RX ORDER — BENZONATATE 100 MG/1
100-200 CAPSULE ORAL 3 TIMES DAILY PRN
Qty: 30 CAPSULE | Refills: 0 | Status: SHIPPED | OUTPATIENT
Start: 2024-01-02 | End: 2024-01-09

## 2024-01-02 RX ORDER — AMOXICILLIN AND CLAVULANATE POTASSIUM 875; 125 MG/1; MG/1
1 TABLET, FILM COATED ORAL 2 TIMES DAILY
Qty: 20 TABLET | Refills: 0 | Status: SHIPPED | OUTPATIENT
Start: 2024-01-02 | End: 2024-01-12

## 2024-01-02 ASSESSMENT — PATIENT HEALTH QUESTIONNAIRE - PHQ9
2. FEELING DOWN, DEPRESSED OR HOPELESS: 0
SUM OF ALL RESPONSES TO PHQ QUESTIONS 1-9: 0
SUM OF ALL RESPONSES TO PHQ QUESTIONS 1-9: 0
1. LITTLE INTEREST OR PLEASURE IN DOING THINGS: 0
SUM OF ALL RESPONSES TO PHQ9 QUESTIONS 1 & 2: 0
SUM OF ALL RESPONSES TO PHQ QUESTIONS 1-9: 0
SUM OF ALL RESPONSES TO PHQ QUESTIONS 1-9: 0

## 2024-01-02 ASSESSMENT — ENCOUNTER SYMPTOMS
ABDOMINAL PAIN: 0
COUGH: 1
SORE THROAT: 0
SHORTNESS OF BREATH: 0
CHEST TIGHTNESS: 0
SINUS PRESSURE: 1
WHEEZING: 0

## 2024-01-02 NOTE — PROGRESS NOTES
Antibiotics Rash       Review of Systems   Constitutional:  Negative for activity change and fever.   HENT:  Positive for congestion and sinus pressure. Negative for postnasal drip and sore throat.    Respiratory:  Positive for cough. Negative for chest tightness, shortness of breath and wheezing.    Cardiovascular:  Negative for chest pain, palpitations and leg swelling.   Gastrointestinal:  Negative for abdominal pain.   Neurological:  Negative for dizziness and headaches.       Vitals:    01/02/24 1549   BP: 120/80   Site: Left Upper Arm   Position: Sitting   Cuff Size: Large Adult   Pulse: 100   Temp: 98.2 °F (36.8 °C)   TempSrc: Oral   SpO2: 98%   Weight: (!) 137 kg (302 lb)   Height: 1.702 m (5' 7\")       Body mass index is 47.3 kg/m².     Wt Readings from Last 3 Encounters:   01/02/24 (!) 137 kg (302 lb)   11/27/23 (!) 137 kg (302 lb)   07/18/23 (!) 142.4 kg (314 lb)       BP Readings from Last 3 Encounters:   01/02/24 120/80   11/27/23 120/86   07/18/23 124/86       Physical Exam  Vitals reviewed.   Constitutional:       General: She is not in acute distress.     Appearance: Normal appearance.   HENT:      Head: Normocephalic and atraumatic.      Right Ear: Tympanic membrane normal.      Nose: Congestion present.      Mouth/Throat:      Pharynx: Oropharynx is clear. No oropharyngeal exudate or posterior oropharyngeal erythema.   Cardiovascular:      Rate and Rhythm: Normal rate and regular rhythm.      Heart sounds: Normal heart sounds. No murmur heard.     No friction rub. No gallop.   Pulmonary:      Effort: Pulmonary effort is normal. No respiratory distress.      Breath sounds: Normal breath sounds. No wheezing.   Musculoskeletal:         General: Normal range of motion.      Right lower leg: No edema.      Left lower leg: No edema.   Skin:     General: Skin is warm and dry.   Neurological:      Mental Status: She is oriented to person, place, and time.   Psychiatric:         Behavior: Behavior normal.

## 2024-01-22 ENCOUNTER — OFFICE VISIT (OUTPATIENT)
Dept: FAMILY MEDICINE CLINIC | Age: 49
End: 2024-01-22
Payer: COMMERCIAL

## 2024-01-22 VITALS
HEIGHT: 67 IN | HEART RATE: 100 BPM | BODY MASS INDEX: 45.99 KG/M2 | SYSTOLIC BLOOD PRESSURE: 120 MMHG | OXYGEN SATURATION: 96 % | DIASTOLIC BLOOD PRESSURE: 82 MMHG | TEMPERATURE: 98.6 F | WEIGHT: 293 LBS

## 2024-01-22 DIAGNOSIS — E11.21 DIABETIC NEPHROPATHY WITH PROTEINURIA (HCC): ICD-10-CM

## 2024-01-22 DIAGNOSIS — L50.5 CHOLINERGIC URTICARIA: ICD-10-CM

## 2024-01-22 DIAGNOSIS — J20.8 ACUTE VIRAL BRONCHITIS: Primary | ICD-10-CM

## 2024-01-22 DIAGNOSIS — E11.21 TYPE 2 DIABETES MELLITUS WITH DIABETIC NEPHROPATHY, WITHOUT LONG-TERM CURRENT USE OF INSULIN (HCC): ICD-10-CM

## 2024-01-22 DIAGNOSIS — I10 HYPERTENSION, ESSENTIAL: ICD-10-CM

## 2024-01-22 DIAGNOSIS — F43.22 ADJUSTMENT DISORDER WITH ANXIOUS MOOD: ICD-10-CM

## 2024-01-22 PROCEDURE — 3079F DIAST BP 80-89 MM HG: CPT

## 2024-01-22 PROCEDURE — 3074F SYST BP LT 130 MM HG: CPT

## 2024-01-22 PROCEDURE — 99213 OFFICE O/P EST LOW 20 MIN: CPT

## 2024-01-22 RX ORDER — ALBUTEROL SULFATE 90 UG/1
2 AEROSOL, METERED RESPIRATORY (INHALATION) 4 TIMES DAILY PRN
Qty: 18 G | Refills: 5 | Status: SHIPPED | OUTPATIENT
Start: 2024-01-22

## 2024-01-22 RX ORDER — BENZONATATE 200 MG/1
200 CAPSULE ORAL 3 TIMES DAILY PRN
Qty: 30 CAPSULE | Refills: 0 | Status: SHIPPED | OUTPATIENT
Start: 2024-01-22 | End: 2024-02-01

## 2024-01-22 RX ORDER — MONTELUKAST SODIUM 10 MG/1
TABLET ORAL
Qty: 90 TABLET | Refills: 1 | Status: SHIPPED | OUTPATIENT
Start: 2024-01-22

## 2024-01-22 RX ORDER — METFORMIN HYDROCHLORIDE 500 MG/1
TABLET, EXTENDED RELEASE ORAL
Qty: 360 TABLET | Refills: 1 | Status: SHIPPED | OUTPATIENT
Start: 2024-01-22

## 2024-01-22 RX ORDER — LOSARTAN POTASSIUM 50 MG/1
TABLET ORAL
Qty: 90 TABLET | Refills: 1 | Status: SHIPPED | OUTPATIENT
Start: 2024-01-22

## 2024-01-22 ASSESSMENT — ENCOUNTER SYMPTOMS
SHORTNESS OF BREATH: 1
SINUS PRESSURE: 1
ABDOMINAL PAIN: 0
WHEEZING: 1
SORE THROAT: 0
COUGH: 1

## 2024-01-22 NOTE — PROGRESS NOTES
respiratory distress.      Breath sounds: Normal breath sounds. No wheezing.   Musculoskeletal:         General: Normal range of motion.      Right lower leg: No edema.      Left lower leg: No edema.   Skin:     General: Skin is warm and dry.   Neurological:      Mental Status: She is oriented to person, place, and time.   Psychiatric:         Behavior: Behavior normal.         Thought Content: Thought content normal.         Judgment: Judgment normal.         Assessmentand Plan  Nithya was seen today for diarrhea.    Diagnoses and all orders for this visit:    Acute viral bronchitis  -     albuterol sulfate HFA (VENTOLIN HFA) 108 (90 Base) MCG/ACT inhaler; Inhale 2 puffs into the lungs 4 times daily as needed for Wheezing  -     benzonatate (TESSALON) 200 MG capsule; Take 1 capsule by mouth 3 times daily as needed for Cough  Symptoms improving  Advised continued supportive measures including ibuprofen and tylenol  Sent in benzonatate for cough  Hydrate well and rest  If symptoms worsen or fail to improve by end of the week, will send in doxycycline  Hypertension, essential  -     losartan (COZAAR) 50 MG tablet; TAKE 1 TABLET EVERY MORNING  Refilled  Type 2 diabetes mellitus with diabetic nephropathy, without long-term current use of insulin (HCC)  -     metFORMIN (GLUCOPHAGE-XR) 500 MG extended release tablet; Take 4 tablets daily with breakfast  Refilled  Diabetic nephropathy with proteinuria (HCC)  -     losartan (COZAAR) 50 MG tablet; TAKE 1 TABLET EVERY MORNING  Refilled  Adjustment disorder with anxious mood  -     sertraline (ZOLOFT) 50 MG tablet; Take one tablet daily  Refilled  Cholinergic urticaria  -     montelukast (SINGULAIR) 10 MG tablet; Take one tablet daily  Refilled      Return if symptoms worsen or fail to improve.

## 2024-01-27 DIAGNOSIS — E11.21 TYPE 2 DIABETES MELLITUS WITH DIABETIC NEPHROPATHY, WITHOUT LONG-TERM CURRENT USE OF INSULIN (HCC): Chronic | ICD-10-CM

## 2024-02-13 ENCOUNTER — TELEMEDICINE (OUTPATIENT)
Dept: PULMONOLOGY | Age: 49
End: 2024-02-13
Payer: COMMERCIAL

## 2024-02-13 DIAGNOSIS — G47.33 OBSTRUCTIVE APNEA: Primary | Chronic | ICD-10-CM

## 2024-02-13 DIAGNOSIS — E11.21 TYPE 2 DIABETES MELLITUS WITH DIABETIC NEPHROPATHY, WITHOUT LONG-TERM CURRENT USE OF INSULIN (HCC): Chronic | ICD-10-CM

## 2024-02-13 DIAGNOSIS — E66.01 OBESITY, CLASS III, BMI 40-49.9 (MORBID OBESITY) (HCC): Chronic | ICD-10-CM

## 2024-02-13 DIAGNOSIS — I10 HYPERTENSION, ESSENTIAL: ICD-10-CM

## 2024-02-13 PROCEDURE — 99214 OFFICE O/P EST MOD 30 MIN: CPT | Performed by: NURSE PRACTITIONER

## 2024-02-13 NOTE — ASSESSMENT & PLAN NOTE
Chronic-Stable: Reviewed and analyzed results of physiologic download from patient's machine and reviewed with patient.  Supplies and parts as needed for her machine.  These are medically necessary.  Limit caffeine use after 3pm. Based on the analyzed data will continue with current settings. Stable on her machine at current settings, getting benefit from the use, and having minimal side effects. Encouraged her to use her machine as much as possible. Could consider trial pressure increase if cough at night does not improve once URI has resolved. She declines pressure adjustment at this time. Discussed seeing her back sooner to evaluate, she declines at this time and would like to schedule follow up for 1 year. Encouraged her to contact the office with any questions or concerns.

## 2024-02-13 NOTE — PROGRESS NOTES
Diagnosis: [x] SARA (G47.33) [] CSA (G47.31) [] Apnea (G47.30)   Length of Need: [x] 15 Months [] 99 Months [] Other:   Machine (MARISSA!): [] Respironics Dream Station      Auto [] ResMed AirSense     Auto [] Other:     []  CPAP () [] Bilevel ()   Mode: [] Auto [] Spontaneous    Mode: [] Auto [] Spontaneous             Comfort Settings:      Humidifier: [] Heated ()        [x] Water chamber replacement ()/ 1 per 6 months        Mask:   [] Nasal () /1 per 3 months [x] Full Face () /1 per 3 months   [] Patient choice -Size and fit mask [x] Patient Choice - Size and fit mask   [] Dispense: [] Dispense:   [] Headgear () / 1 per 3 months [x] Headgear () / 1 per 3 months   [] Replacement Nasal Cushion ()/2 per month [x] Interface Replacement ()/1 per month   [] Replacement Nasal Pillows ()/2 per month         Tubing: [x] Heated ()/1 per 3 months    [] Standard ()/1 per 3 months [] Other:           Filters: [x] Non-disposable ()/1 per 6 months     [x] Ultra-Fine, Disposable ()/2 per month        Miscellaneous: [] Chin Strap ()/ 1 per 6 months [] O2 bleed-in:        LPM   [] Oxymetry on CPAP/Bilevel []  Other:         Start Order Date: 02/13/24    MEDICAL JUSTIFICATION:  I, the undersigned, certify that the above prescribed supplies are medically necessary for this patient’s wellbeing.  In my opinion, the supplies are both reasonable and necessary in reference to accepted standards of medicalpractice in treatment of this patient’s condition.    STEPHANIE SHORT NP    NPI: [id number]       Order Signed Date: 02/13/24  [facility]  Pulmonary, Sleep, and Critical Care    Pulmonary, Sleep, and Critical Care  [facility] [address]                          [address] [place] [address] [address]  [place] [address]                                    [place] [address]  Phone: [phone]    Fax: 
Yes

## 2024-02-13 NOTE — PROGRESS NOTES
Job Larson CNP  Krystle Lopez CNP [facility]  [address]  [address]  [place]  [address]  P- [phone]   [facility]  [address]  [address]  [place] [address]  F- [phone]     Video Visit- Follow up      Assessment/Plan:      1. Obstructive apnea- CPAP  Assessment & Plan:  Chronic-Stable: Reviewed and analyzed results of physiologic download from patient's machine and reviewed with patient.  Supplies and parts as needed for her machine.  These are medically necessary.  Limit caffeine use after 3pm. Based on the analyzed data will continue with current settings. Stable on her machine at current settings, getting benefit from the use, and having minimal side effects. Encouraged her to use her machine as much as possible. Could consider trial pressure increase if cough at night does not improve once URI has resolved. She declines pressure adjustment at this time. Discussed seeing her back sooner to evaluate, she declines at this time and would like to schedule follow up for 1 year. Encouraged her to contact the office with any questions or concerns.     2. Hypertension, essential  Assessment & Plan:   Chronic- Stable.  Discussed the importance of treating obstructive sleep apnea as part of the management of this disorder.  Cont any meds per PCP and other physicians.    3. Type 2 diabetes mellitus with diabetic nephropathy, without long-term current use of insulin ([facility])  Assessment & Plan:   Chronic- Stable.  Discussed the importance of treating obstructive sleep apnea as part of the management of this disorder.  Cont any meds per PCP and other physicians.    4. Obesity, Class III, BMI 40-49.9 (morbid obesity) ([facility])  Assessment & Plan:   Chronic-not stable:  Discussed importance of treating obstructive sleep apnea and getting sufficient sleep to assist with weight control.  Discussed weight gain and/or weight loss may require adjustments to machine settings. Encouraged her to

## 2024-03-28 DIAGNOSIS — J45.21 MILD INTERMITTENT ASTHMA WITH ACUTE EXACERBATION: Chronic | ICD-10-CM

## 2024-03-29 SDOH — ECONOMIC STABILITY: FOOD INSECURITY: WITHIN THE PAST 12 MONTHS, THE FOOD YOU BOUGHT JUST DIDN'T LAST AND YOU DIDN'T HAVE MONEY TO GET MORE.: NEVER TRUE

## 2024-03-29 SDOH — ECONOMIC STABILITY: INCOME INSECURITY: HOW HARD IS IT FOR YOU TO PAY FOR THE VERY BASICS LIKE FOOD, HOUSING, MEDICAL CARE, AND HEATING?: NOT VERY HARD

## 2024-03-29 SDOH — ECONOMIC STABILITY: FOOD INSECURITY: WITHIN THE PAST 12 MONTHS, YOU WORRIED THAT YOUR FOOD WOULD RUN OUT BEFORE YOU GOT MONEY TO BUY MORE.: NEVER TRUE

## 2024-04-01 ENCOUNTER — OFFICE VISIT (OUTPATIENT)
Dept: FAMILY MEDICINE CLINIC | Age: 49
End: 2024-04-01
Payer: COMMERCIAL

## 2024-04-01 VITALS
SYSTOLIC BLOOD PRESSURE: 122 MMHG | OXYGEN SATURATION: 98 % | WEIGHT: 293 LBS | BODY MASS INDEX: 45.99 KG/M2 | HEIGHT: 67 IN | DIASTOLIC BLOOD PRESSURE: 78 MMHG | HEART RATE: 102 BPM

## 2024-04-01 DIAGNOSIS — D50.9 MICROCYTIC ANEMIA: ICD-10-CM

## 2024-04-01 DIAGNOSIS — E78.5 HYPERLIPIDEMIA LDL GOAL <100: ICD-10-CM

## 2024-04-01 DIAGNOSIS — I10 HYPERTENSION, ESSENTIAL: ICD-10-CM

## 2024-04-01 DIAGNOSIS — E11.21 TYPE 2 DIABETES MELLITUS WITH DIABETIC NEPHROPATHY, WITHOUT LONG-TERM CURRENT USE OF INSULIN (HCC): Primary | Chronic | ICD-10-CM

## 2024-04-01 LAB — HBA1C MFR BLD: 7.4 %

## 2024-04-01 PROCEDURE — 99214 OFFICE O/P EST MOD 30 MIN: CPT

## 2024-04-01 PROCEDURE — 3078F DIAST BP <80 MM HG: CPT

## 2024-04-01 PROCEDURE — 3074F SYST BP LT 130 MM HG: CPT

## 2024-04-01 PROCEDURE — 3051F HG A1C>EQUAL 7.0%<8.0%: CPT

## 2024-04-01 PROCEDURE — 83036 HEMOGLOBIN GLYCOSYLATED A1C: CPT

## 2024-04-01 ASSESSMENT — ENCOUNTER SYMPTOMS
ABDOMINAL PAIN: 0
SHORTNESS OF BREATH: 0

## 2024-04-01 NOTE — PROGRESS NOTES
4/1/2024    This is a 48 y.o. female   Chief Complaint   Patient presents with    Other     Check left ear - went to urgent care for ear infection 1 week ago. Make sure it is cleared up.  Wanted to check about taking Berberine for BS? Is this a good supplement to take>         .    HPI    Was treated at urgent care about 1 week ago for ear infection- feeling better but wants it checked.    RLS: improving with iron supplement    Diabetes Mellitus Type 2: Current symptoms/problems include none.    Home blood sugar records: patient does not test  Eye exam current (within one year): yes  Tobacco history: She  reports that she has never smoked. She has never been exposed to tobacco smoke. She has never used smokeless tobacco.     Hypertension:  Home blood pressure monitoring: No.  She is not adherent to a low sodium diet. Patient denies chest pain, shortness of breath, headache, lightheadedness, blurred vision, peripheral edema, palpitations, dry cough, and fatigue.  Antihypertensive medication side effects: no medication side effects noted.  Use of agents associated with hypertension: none.     Hyperlipidemia:  Not on medications.  The 10-year ASCVD risk score (Naomi DK, et al., 2019) is: 1.6%    Values used to calculate the score:      Age: 48 years      Sex: Female      Is Non- : No      Diabetic: Yes      Tobacco smoker: No      Systolic Blood Pressure: 122 mmHg      Is BP treated: Yes      HDL Cholesterol: 67 mg/dL      Total Cholesterol: 208 mg/dL    Lab Results   Component Value Date    LABA1C 7.4 04/01/2024    LABA1C 6.8 11/27/2023    LABA1C 6.5 07/18/2023     Lab Results   Component Value Date    CREATININE 0.7 11/27/2023     Lab Results   Component Value Date    ALT 39 11/27/2023    AST 49 (H) 11/27/2023     Lab Results   Component Value Date    CHOL 208 (H) 05/23/2023    TRIG 176 (H) 05/23/2023    HDL 67 (H) 05/23/2023    LDLCALC 106 (H) 05/23/2023           Patient Active Problem List

## 2024-04-30 RX ORDER — PANTOPRAZOLE SODIUM 40 MG/1
TABLET, DELAYED RELEASE ORAL
Qty: 180 TABLET | Refills: 1 | Status: SHIPPED | OUTPATIENT
Start: 2024-04-30

## 2024-05-03 ENCOUNTER — OFFICE VISIT (OUTPATIENT)
Dept: FAMILY MEDICINE CLINIC | Age: 49
End: 2024-05-03
Payer: COMMERCIAL

## 2024-05-03 VITALS
HEART RATE: 94 BPM | OXYGEN SATURATION: 98 % | HEIGHT: 67 IN | TEMPERATURE: 98 F | SYSTOLIC BLOOD PRESSURE: 122 MMHG | BODY MASS INDEX: 45.99 KG/M2 | WEIGHT: 293 LBS | DIASTOLIC BLOOD PRESSURE: 70 MMHG

## 2024-05-03 DIAGNOSIS — I10 HYPERTENSION, ESSENTIAL: ICD-10-CM

## 2024-05-03 DIAGNOSIS — M26.622 ARTHRALGIA OF LEFT TEMPOROMANDIBULAR JOINT: ICD-10-CM

## 2024-05-03 DIAGNOSIS — H92.02 LEFT EAR PAIN: Primary | ICD-10-CM

## 2024-05-03 DIAGNOSIS — E78.5 HYPERLIPIDEMIA LDL GOAL <100: ICD-10-CM

## 2024-05-03 DIAGNOSIS — D50.9 MICROCYTIC ANEMIA: ICD-10-CM

## 2024-05-03 LAB
ALBUMIN SERPL-MCNC: 4 G/DL (ref 3.4–5)
ALBUMIN/GLOB SERPL: 1.4 {RATIO} (ref 1.1–2.2)
ALP SERPL-CCNC: 114 U/L (ref 40–129)
ALT SERPL-CCNC: 19 U/L (ref 10–40)
ANION GAP SERPL CALCULATED.3IONS-SCNC: 14 MMOL/L (ref 3–16)
AST SERPL-CCNC: 23 U/L (ref 15–37)
BASOPHILS # BLD: 0 K/UL (ref 0–0.2)
BASOPHILS NFR BLD: 0.3 %
BILIRUB SERPL-MCNC: 0.3 MG/DL (ref 0–1)
BUN SERPL-MCNC: 14 MG/DL (ref 7–20)
CALCIUM SERPL-MCNC: 9.2 MG/DL (ref 8.3–10.6)
CHLORIDE SERPL-SCNC: 102 MMOL/L (ref 99–110)
CHOLEST SERPL-MCNC: 201 MG/DL (ref 0–199)
CO2 SERPL-SCNC: 23 MMOL/L (ref 21–32)
CREAT SERPL-MCNC: 0.6 MG/DL (ref 0.6–1.1)
DEPRECATED RDW RBC AUTO: 14.5 % (ref 12.4–15.4)
EOSINOPHIL # BLD: 0.1 K/UL (ref 0–0.6)
EOSINOPHIL NFR BLD: 0.7 %
FERRITIN SERPL IA-MCNC: 83.7 NG/ML (ref 15–150)
GFR SERPLBLD CREATININE-BSD FMLA CKD-EPI: >90 ML/MIN/{1.73_M2}
GLUCOSE SERPL-MCNC: 158 MG/DL (ref 70–99)
HCT VFR BLD AUTO: 38.2 % (ref 36–48)
HDLC SERPL-MCNC: 66 MG/DL (ref 40–60)
HGB BLD-MCNC: 12.8 G/DL (ref 12–16)
IRON SATN MFR SERPL: 14 % (ref 15–50)
IRON SERPL-MCNC: 64 UG/DL (ref 37–145)
LDLC SERPL CALC-MCNC: 111 MG/DL
LYMPHOCYTES # BLD: 2.4 K/UL (ref 1–5.1)
LYMPHOCYTES NFR BLD: 25.8 %
MCH RBC QN AUTO: 29.2 PG (ref 26–34)
MCHC RBC AUTO-ENTMCNC: 33.5 G/DL (ref 31–36)
MCV RBC AUTO: 87.2 FL (ref 80–100)
MONOCYTES # BLD: 0.5 K/UL (ref 0–1.3)
MONOCYTES NFR BLD: 5.3 %
NEUTROPHILS # BLD: 6.3 K/UL (ref 1.7–7.7)
NEUTROPHILS NFR BLD: 67.9 %
PLATELET # BLD AUTO: 292 K/UL (ref 135–450)
PMV BLD AUTO: 9.3 FL (ref 5–10.5)
POTASSIUM SERPL-SCNC: 4.6 MMOL/L (ref 3.5–5.1)
PROT SERPL-MCNC: 6.9 G/DL (ref 6.4–8.2)
RBC # BLD AUTO: 4.38 M/UL (ref 4–5.2)
SODIUM SERPL-SCNC: 139 MMOL/L (ref 136–145)
TIBC SERPL-MCNC: 444 UG/DL (ref 260–445)
TRIGL SERPL-MCNC: 118 MG/DL (ref 0–150)
VLDLC SERPL CALC-MCNC: 24 MG/DL
WBC # BLD AUTO: 9.2 K/UL (ref 4–11)

## 2024-05-03 PROCEDURE — 99213 OFFICE O/P EST LOW 20 MIN: CPT

## 2024-05-03 PROCEDURE — 3078F DIAST BP <80 MM HG: CPT

## 2024-05-03 PROCEDURE — 3074F SYST BP LT 130 MM HG: CPT

## 2024-05-03 RX ORDER — CYCLOBENZAPRINE HCL 5 MG
TABLET ORAL
Qty: 60 TABLET | Refills: 0 | Status: SHIPPED | OUTPATIENT
Start: 2024-05-03

## 2024-05-03 ASSESSMENT — ENCOUNTER SYMPTOMS
EYE DISCHARGE: 0
COLOR CHANGE: 0
FACIAL SWELLING: 0
RHINORRHEA: 1
COUGH: 0
SORE THROAT: 1
TROUBLE SWALLOWING: 0
WHEEZING: 0

## 2024-05-03 NOTE — PROGRESS NOTES
5/3/2024    This is a 48 y.o. female   Chief Complaint   Patient presents with    Otalgia     Continued ear pain, did stop. Didn't go to ENT  Now left ear feels like infected, and side of neck is tender.   .    HPI    Was treat end of March for ear infection- resolved  Developed recurrent pain about 10 days later which has been waxing and waning since  Pain is becoming more persistent  She describes fullness and pain deep in her ear and pain that runs down her left jaw/neck  She also has a mild sore throat  She has chronic allergies  On claritin, singulair and flonase, but she hasn't been taking her flonase regularly  She does endorse increased stress/anxiety and she also reports that she does grind/clench her teeth  Pain is usually worse first thing in AM       Patient Active Problem List   Diagnosis    Seborrhea    Dysfunctional uterine bleeding    Candidal intertrigo    Urticaria, idiopathic    Low back pain    Allergic rhinitis    Asthma- mild persistent    GERD (gastroesophageal reflux disease)    Cholinergic urticaria    Hiatal hernia    Obesity, Class III, BMI 40-49.9 (morbid obesity) (HCC)    Obstructive apnea- CPAP    Hyperlipidemia LDL goal <100    Type 2 diabetes mellitus with diabetic nephropathy (HCC)    Irritable bowel syndrome with diarrhea    History of colon polyps    Anxiety    Microcytic anemia    RLS (restless legs syndrome)    Hypertension, essential    Sepsis (HCC)    Abdominal pain    Calculus of gallbladder without cholecystitis without obstruction    Elevated AST (SGOT) mild 11/2023- repeat next visit       Current Outpatient Medications   Medication Sig Dispense Refill    cyclobenzaprine (FLEXERIL) 5 MG tablet Take 1-2 tablets nightly for jaw pain 60 tablet 0    pantoprazole (PROTONIX) 40 MG tablet TAKE 1 TABLET BY MOUTH TWICE DAILY 180 tablet 1    fluticasone proprionate 100 MCG/ACT AEPB Inhale 2 puffs into the lungs daily 180 each 0    dulaglutide (TRULICITY) 1.5 MG/0.5ML SC injection

## 2024-06-12 DIAGNOSIS — K58.0 IRRITABLE BOWEL SYNDROME WITH DIARRHEA: Primary | ICD-10-CM

## 2024-06-12 RX ORDER — DICYCLOMINE HCL 20 MG
20 TABLET ORAL 3 TIMES DAILY PRN
Qty: 90 TABLET | Refills: 2 | Status: SHIPPED | OUTPATIENT
Start: 2024-06-12 | End: 2024-09-10

## 2024-07-19 ENCOUNTER — OFFICE VISIT (OUTPATIENT)
Dept: FAMILY MEDICINE CLINIC | Age: 49
End: 2024-07-19

## 2024-07-19 VITALS
BODY MASS INDEX: 45.99 KG/M2 | HEART RATE: 96 BPM | HEIGHT: 67 IN | DIASTOLIC BLOOD PRESSURE: 80 MMHG | WEIGHT: 293 LBS | OXYGEN SATURATION: 96 % | SYSTOLIC BLOOD PRESSURE: 132 MMHG

## 2024-07-19 DIAGNOSIS — I10 HYPERTENSION, ESSENTIAL: ICD-10-CM

## 2024-07-19 DIAGNOSIS — R30.0 DYSURIA: ICD-10-CM

## 2024-07-19 DIAGNOSIS — E11.21 TYPE 2 DIABETES MELLITUS WITH DIABETIC NEPHROPATHY, WITHOUT LONG-TERM CURRENT USE OF INSULIN (HCC): ICD-10-CM

## 2024-07-19 DIAGNOSIS — E78.5 HYPERLIPIDEMIA LDL GOAL <100: ICD-10-CM

## 2024-07-19 DIAGNOSIS — E11.21 DIABETIC NEPHROPATHY WITH PROTEINURIA (HCC): ICD-10-CM

## 2024-07-19 DIAGNOSIS — K21.00 GASTROESOPHAGEAL REFLUX DISEASE WITH ESOPHAGITIS WITHOUT HEMORRHAGE: Chronic | ICD-10-CM

## 2024-07-19 DIAGNOSIS — Z01.419 WELL WOMAN EXAM WITH ROUTINE GYNECOLOGICAL EXAM: Primary | ICD-10-CM

## 2024-07-19 DIAGNOSIS — N89.8 VAGINAL ITCHING: ICD-10-CM

## 2024-07-19 DIAGNOSIS — F43.22 ADJUSTMENT DISORDER WITH ANXIOUS MOOD: ICD-10-CM

## 2024-07-19 DIAGNOSIS — L50.5 CHOLINERGIC URTICARIA: ICD-10-CM

## 2024-07-19 DIAGNOSIS — L27.0 RASH, DRUG: ICD-10-CM

## 2024-07-19 DIAGNOSIS — E11.21 TYPE 2 DIABETES MELLITUS WITH DIABETIC NEPHROPATHY, WITHOUT LONG-TERM CURRENT USE OF INSULIN (HCC): Chronic | ICD-10-CM

## 2024-07-19 DIAGNOSIS — N95.9 MENOPAUSAL AND PERIMENOPAUSAL DISORDER: ICD-10-CM

## 2024-07-19 LAB
BILIRUBIN, POC: ABNORMAL
BLOOD URINE, POC: ABNORMAL
CLARITY, POC: CLEAR
COLOR, POC: ABNORMAL
CREAT UR-MCNC: 258.2 MG/DL (ref 28–259)
GLUCOSE URINE, POC: ABNORMAL
HBA1C MFR BLD: 7.3 %
KETONES, POC: ABNORMAL
LEUKOCYTE EST, POC: ABNORMAL
MICROALBUMIN UR DL<=1MG/L-MCNC: 4.8 MG/DL
MICROALBUMIN/CREAT UR: 18.6 MG/G (ref 0–30)
NITRITE, POC: ABNORMAL
PH, POC: 5.5
PROTEIN, POC: 30
SPECIFIC GRAVITY, POC: >1.03
UROBILINOGEN, POC: 0.2

## 2024-07-19 RX ORDER — MONTELUKAST SODIUM 10 MG/1
TABLET ORAL
Qty: 90 TABLET | Refills: 1 | Status: SHIPPED | OUTPATIENT
Start: 2024-07-19

## 2024-07-19 RX ORDER — METFORMIN HYDROCHLORIDE 500 MG/1
TABLET, EXTENDED RELEASE ORAL
Qty: 360 TABLET | Refills: 1 | Status: SHIPPED | OUTPATIENT
Start: 2024-07-19

## 2024-07-19 RX ORDER — CIPROFLOXACIN 500 MG/1
500 TABLET, FILM COATED ORAL 2 TIMES DAILY
Qty: 20 TABLET | Refills: 0 | Status: SHIPPED | OUTPATIENT
Start: 2024-07-19 | End: 2024-07-19

## 2024-07-19 RX ORDER — METHYLPREDNISOLONE 4 MG/1
TABLET ORAL
Qty: 1 KIT | Refills: 0 | Status: SHIPPED | OUTPATIENT
Start: 2024-07-19

## 2024-07-19 RX ORDER — CIPROFLOXACIN 500 MG/1
500 TABLET, FILM COATED ORAL 2 TIMES DAILY
Qty: 20 TABLET | Refills: 0 | Status: SHIPPED | OUTPATIENT
Start: 2024-07-19 | End: 2024-07-29

## 2024-07-19 RX ORDER — LOSARTAN POTASSIUM 50 MG/1
TABLET ORAL
Qty: 90 TABLET | Refills: 1 | Status: SHIPPED | OUTPATIENT
Start: 2024-07-19

## 2024-07-19 NOTE — PROGRESS NOTES
SUBJECTIVE:   48 y.o. female for annual routine Pap and checkup.    Diabetes Mellitus Type 2: Current symptoms/problems include none.    Home blood sugar records: patient does not test  Eye exam current (within one year): yes  Tobacco history: She  reports that she has never smoked. She has never been exposed to tobacco smoke. She has never used smokeless tobacco.     Hypertension:  Home blood pressure monitoring: Yes - 120s/80s.  She is not adherent to a low sodium diet. Patient denies chest pain, shortness of breath, headache, lightheadedness, blurred vision, peripheral edema, palpitations, dry cough, and fatigue.  Antihypertensive medication side effects: no medication side effects noted.  Use of agents associated with hypertension: none.     Hyperlipidemia:  Not on medications.  The 10-year ASCVD risk score (Naomi BRADFORD, et al., 2019) is: 1.9%    Values used to calculate the score:      Age: 48 years      Sex: Female      Is Non- : No      Diabetic: Yes      Tobacco smoker: No      Systolic Blood Pressure: 132 mmHg      Is BP treated: Yes      HDL Cholesterol: 66 mg/dL      Total Cholesterol: 201 mg/dL     Lab Results   Component Value Date    LABA1C 7.4 04/01/2024    LABA1C 6.8 11/27/2023    LABA1C 6.5 07/18/2023     Lab Results   Component Value Date    CREATININE 0.6 05/03/2024     Lab Results   Component Value Date    ALT 19 05/03/2024    AST 23 05/03/2024     Lab Results   Component Value Date    CHOL 201 (H) 05/03/2024    TRIG 118 05/03/2024    HDL 66 (H) 05/03/2024        Current Outpatient Medications   Medication Sig Dispense Refill    dicyclomine (BENTYL) 20 MG tablet Take 1 tablet by mouth 3 times daily as needed (cramps) 90 tablet 2    cyclobenzaprine (FLEXERIL) 5 MG tablet Take 1-2 tablets nightly for jaw pain 60 tablet 0    pantoprazole (PROTONIX) 40 MG tablet TAKE 1 TABLET BY MOUTH TWICE DAILY 180 tablet 1    fluticasone proprionate 100 MCG/ACT AEPB Inhale 2 puffs into the

## 2024-07-20 LAB
CANDIDA DNA VAG QL NAA+PROBE: ABNORMAL
G VAGINALIS DNA SPEC QL NAA+PROBE: ABNORMAL
T VAGINALIS DNA VAG QL NAA+PROBE: ABNORMAL

## 2024-07-21 LAB — BACTERIA UR CULT: NORMAL

## 2024-07-22 DIAGNOSIS — N76.0 BACTERIAL VAGINOSIS: Primary | ICD-10-CM

## 2024-07-22 DIAGNOSIS — B96.89 BACTERIAL VAGINOSIS: Primary | ICD-10-CM

## 2024-07-22 RX ORDER — METRONIDAZOLE 500 MG/1
500 TABLET ORAL 2 TIMES DAILY
Qty: 14 TABLET | Refills: 0 | Status: SHIPPED | OUTPATIENT
Start: 2024-07-22 | End: 2024-07-29

## 2024-07-23 LAB
HPV HR 12 DNA SPEC QL NAA+PROBE: NOT DETECTED
HPV16 DNA SPEC QL NAA+PROBE: NOT DETECTED
HPV16+18+H RISK 12 DNA SPEC-IMP: NORMAL
HPV18 DNA SPEC QL NAA+PROBE: NOT DETECTED

## 2024-08-01 ENCOUNTER — TELEPHONE (OUTPATIENT)
Dept: FAMILY MEDICINE CLINIC | Age: 49
End: 2024-08-01

## 2024-08-01 DIAGNOSIS — E11.21 TYPE 2 DIABETES MELLITUS WITH DIABETIC NEPHROPATHY, WITHOUT LONG-TERM CURRENT USE OF INSULIN (HCC): Chronic | ICD-10-CM

## 2024-08-01 NOTE — TELEPHONE ENCOUNTER
Patient sent in message that [employer] approved but needs to be sent to her [facility] on [address].  Med is pending.

## 2024-08-05 ENCOUNTER — PATIENT MESSAGE (OUTPATIENT)
Dept: FAMILY MEDICINE CLINIC | Age: 49
End: 2024-08-05

## 2024-08-05 DIAGNOSIS — B96.89 BV (BACTERIAL VAGINOSIS): Primary | ICD-10-CM

## 2024-08-05 DIAGNOSIS — N76.0 BV (BACTERIAL VAGINOSIS): Primary | ICD-10-CM

## 2024-08-05 RX ORDER — METRONIDAZOLE 7.5 MG/G
GEL VAGINAL
Qty: 1 EACH | Refills: 1 | Status: SHIPPED | OUTPATIENT
Start: 2024-08-05

## 2024-09-12 ENCOUNTER — PATIENT MESSAGE (OUTPATIENT)
Dept: FAMILY MEDICINE CLINIC | Age: 49
End: 2024-09-12

## 2024-09-24 ENCOUNTER — OFFICE VISIT (OUTPATIENT)
Dept: FAMILY MEDICINE CLINIC | Age: 49
End: 2024-09-24
Payer: COMMERCIAL

## 2024-09-24 VITALS
BODY MASS INDEX: 45.99 KG/M2 | DIASTOLIC BLOOD PRESSURE: 80 MMHG | HEART RATE: 96 BPM | HEIGHT: 67 IN | SYSTOLIC BLOOD PRESSURE: 126 MMHG | WEIGHT: 293 LBS | OXYGEN SATURATION: 96 %

## 2024-09-24 DIAGNOSIS — N76.0 BV (BACTERIAL VAGINOSIS): ICD-10-CM

## 2024-09-24 DIAGNOSIS — B96.89 BV (BACTERIAL VAGINOSIS): ICD-10-CM

## 2024-09-24 PROCEDURE — 99214 OFFICE O/P EST MOD 30 MIN: CPT

## 2024-09-24 PROCEDURE — 3074F SYST BP LT 130 MM HG: CPT

## 2024-09-24 PROCEDURE — 3079F DIAST BP 80-89 MM HG: CPT

## 2024-09-24 RX ORDER — METRONIDAZOLE 7.5 MG/G
GEL VAGINAL
Qty: 3 EACH | Refills: 1 | Status: SHIPPED | OUTPATIENT
Start: 2024-09-24

## 2024-09-25 ASSESSMENT — ENCOUNTER SYMPTOMS
SHORTNESS OF BREATH: 0
ABDOMINAL PAIN: 0

## 2024-09-29 ENCOUNTER — PATIENT MESSAGE (OUTPATIENT)
Dept: FAMILY MEDICINE CLINIC | Age: 49
End: 2024-09-29

## 2024-09-29 DIAGNOSIS — E66.01 OBESITY, CLASS III, BMI 40-49.9 (MORBID OBESITY): Primary | Chronic | ICD-10-CM

## 2024-09-29 DIAGNOSIS — E11.21 TYPE 2 DIABETES MELLITUS WITH DIABETIC NEPHROPATHY, WITHOUT LONG-TERM CURRENT USE OF INSULIN (HCC): Chronic | ICD-10-CM

## 2024-09-30 RX ORDER — TIRZEPATIDE 7.5 MG/.5ML
7.5 INJECTION, SOLUTION SUBCUTANEOUS WEEKLY
Qty: 4 ADJUSTABLE DOSE PRE-FILLED PEN SYRINGE | Refills: 0 | Status: SHIPPED | OUTPATIENT
Start: 2024-09-30

## 2024-10-11 ENCOUNTER — PATIENT MESSAGE (OUTPATIENT)
Dept: FAMILY MEDICINE CLINIC | Age: 49
End: 2024-10-11

## 2024-10-22 DIAGNOSIS — N93.8 DUB (DYSFUNCTIONAL UTERINE BLEEDING): ICD-10-CM

## 2024-10-23 RX ORDER — NORETHINDRONE AND ETHINYL ESTRADIOL 7 DAYS X 3
1 KIT ORAL DAILY
Qty: 84 TABLET | Refills: 3 | Status: SHIPPED | OUTPATIENT
Start: 2024-10-23

## 2024-10-28 RX ORDER — PANTOPRAZOLE SODIUM 40 MG/1
TABLET, DELAYED RELEASE ORAL
Qty: 180 TABLET | Refills: 1 | Status: SHIPPED | OUTPATIENT
Start: 2024-10-28

## 2024-11-03 ENCOUNTER — PATIENT MESSAGE (OUTPATIENT)
Dept: FAMILY MEDICINE CLINIC | Age: 49
End: 2024-11-03

## 2024-11-03 DIAGNOSIS — E66.01 OBESITY, CLASS III, BMI 40-49.9 (MORBID OBESITY): ICD-10-CM

## 2024-11-03 DIAGNOSIS — E11.21 TYPE 2 DIABETES MELLITUS WITH DIABETIC NEPHROPATHY, WITHOUT LONG-TERM CURRENT USE OF INSULIN (HCC): Primary | ICD-10-CM

## 2024-11-08 RX ORDER — TIRZEPATIDE 10 MG/.5ML
10 INJECTION, SOLUTION SUBCUTANEOUS WEEKLY
Qty: 2 ML | Refills: 2 | Status: SHIPPED | OUTPATIENT
Start: 2024-11-08

## 2024-12-09 ENCOUNTER — OFFICE VISIT (OUTPATIENT)
Dept: FAMILY MEDICINE CLINIC | Age: 49
End: 2024-12-09
Payer: COMMERCIAL

## 2024-12-09 VITALS
TEMPERATURE: 98.3 F | OXYGEN SATURATION: 97 % | HEART RATE: 105 BPM | HEIGHT: 67 IN | RESPIRATION RATE: 18 BRPM | BODY MASS INDEX: 45.99 KG/M2 | DIASTOLIC BLOOD PRESSURE: 78 MMHG | WEIGHT: 293 LBS | SYSTOLIC BLOOD PRESSURE: 125 MMHG

## 2024-12-09 DIAGNOSIS — B96.89 ACUTE BACTERIAL SINUSITIS: Primary | ICD-10-CM

## 2024-12-09 DIAGNOSIS — J45.20 MILD INTERMITTENT ASTHMA WITHOUT COMPLICATION: Chronic | ICD-10-CM

## 2024-12-09 DIAGNOSIS — R05.8 PRODUCTIVE COUGH: ICD-10-CM

## 2024-12-09 DIAGNOSIS — J01.90 ACUTE BACTERIAL SINUSITIS: Primary | ICD-10-CM

## 2024-12-09 PROCEDURE — 3078F DIAST BP <80 MM HG: CPT

## 2024-12-09 PROCEDURE — 3074F SYST BP LT 130 MM HG: CPT

## 2024-12-09 PROCEDURE — G2211 COMPLEX E/M VISIT ADD ON: HCPCS

## 2024-12-09 PROCEDURE — 99213 OFFICE O/P EST LOW 20 MIN: CPT

## 2024-12-09 RX ORDER — FLUTICASONE PROPIONATE AND SALMETEROL XINAFOATE 115; 21 UG/1; UG/1
2 AEROSOL, METERED RESPIRATORY (INHALATION) 2 TIMES DAILY
Qty: 12 G | Refills: 5 | Status: SHIPPED | OUTPATIENT
Start: 2024-12-09

## 2024-12-09 RX ORDER — BENZONATATE 100 MG/1
100-200 CAPSULE ORAL 3 TIMES DAILY PRN
Qty: 60 CAPSULE | Refills: 0 | Status: SHIPPED | OUTPATIENT
Start: 2024-12-09

## 2024-12-09 ASSESSMENT — ENCOUNTER SYMPTOMS
SHORTNESS OF BREATH: 0
SORE THROAT: 0
WHEEZING: 0
ABDOMINAL PAIN: 0
CHEST TIGHTNESS: 0
COUGH: 1
SINUS PRESSURE: 1

## 2024-12-09 NOTE — PROGRESS NOTES
12/9/2024    This is a 49 y.o. female   Chief Complaint   Patient presents with    Sinus Problem     Pt was recovering from cold now has ear pain and sinus pressure    .    HPI    Nithya is here today with complaints of not feeling well.  She reports that she had a cold for about a week and was improving.  She has been having worsening symptoms for the past 3-4 days including ear pain, sinus pressure, yellow mucus, and a productive cough  No fevers, wheezing or SOB  Did not test for COVID  HOP is sick with similar symptoms  She is using albuterol PRN and daily flovent     Patient Active Problem List   Diagnosis    Seborrhea    Dysfunctional uterine bleeding    Candidal intertrigo    Urticaria, idiopathic    Low back pain    Allergic rhinitis    Asthma- mild persistent    GERD (gastroesophageal reflux disease)    Cholinergic urticaria    Hiatal hernia    Obesity, Class III, BMI 40-49.9 (morbid obesity)    Obstructive apnea- CPAP    Hyperlipidemia LDL goal <100    Type 2 diabetes mellitus with diabetic nephropathy (HCC)    Irritable bowel syndrome with diarrhea    History of colon polyps    Anxiety    Microcytic anemia    RLS (restless legs syndrome)    Hypertension, essential    Sepsis (HCC)    Abdominal pain    Calculus of gallbladder without cholecystitis without obstruction    Elevated AST (SGOT) mild 11/2023- repeat next visit       Current Outpatient Medications   Medication Sig Dispense Refill    amoxicillin-clavulanate (AUGMENTIN) 875-125 MG per tablet Take 1 tablet by mouth 2 times daily for 10 days 20 tablet 0    benzonatate (TESSALON PERLES) 100 MG capsule Take 1-2 capsules by mouth 3 times daily as needed for Cough 60 capsule 0    fluticasone-salmeterol (ADVAIR HFA) 115-21 MCG/ACT inhaler Inhale 2 puffs into the lungs 2 times daily For long-term control of asthma or COPD symptoms. 12 g 5    estradiol (ESTRACE VAGINAL) 0.1 MG/GM vaginal cream Apply a small amount topically to vaginal tissue daily 42.5 g 0

## 2024-12-17 ENCOUNTER — OFFICE VISIT (OUTPATIENT)
Dept: FAMILY MEDICINE CLINIC | Age: 49
End: 2024-12-17

## 2024-12-17 VITALS
DIASTOLIC BLOOD PRESSURE: 80 MMHG | RESPIRATION RATE: 18 BRPM | TEMPERATURE: 97.7 F | BODY MASS INDEX: 45.99 KG/M2 | HEART RATE: 104 BPM | WEIGHT: 293 LBS | OXYGEN SATURATION: 98 % | HEIGHT: 67 IN | SYSTOLIC BLOOD PRESSURE: 132 MMHG

## 2024-12-17 DIAGNOSIS — J01.90 ACUTE BACTERIAL SINUSITIS: Primary | ICD-10-CM

## 2024-12-17 DIAGNOSIS — H93.8X3 EAR POPPING, BILATERAL: ICD-10-CM

## 2024-12-17 DIAGNOSIS — B96.89 ACUTE BACTERIAL SINUSITIS: Primary | ICD-10-CM

## 2024-12-17 DIAGNOSIS — H92.03 OTALGIA OF BOTH EARS: ICD-10-CM

## 2024-12-17 ASSESSMENT — ENCOUNTER SYMPTOMS
ABDOMINAL PAIN: 0
SINUS PRESSURE: 1
SHORTNESS OF BREATH: 0
SINUS PAIN: 0

## 2024-12-17 NOTE — PROGRESS NOTES
Rate and Rhythm: Normal rate and regular rhythm.      Heart sounds: Normal heart sounds. No murmur heard.     No friction rub. No gallop.   Pulmonary:      Effort: Pulmonary effort is normal. No respiratory distress.      Breath sounds: Normal breath sounds.   Musculoskeletal:         General: Normal range of motion.      Right lower leg: No edema.      Left lower leg: No edema.   Skin:     General: Skin is warm and dry.   Neurological:      Mental Status: She is oriented to person, place, and time.   Psychiatric:         Behavior: Behavior normal.         Thought Content: Thought content normal.         Judgment: Judgment normal.         Assessmentand Plan  Nithya was seen today for ear pain.    Diagnoses and all orders for this visit:    Acute bacterial sinusitis    Ear popping, bilateral    Otalgia of both ears    TMs normal on exam  Advised to complete full course of antibiotics  Will have her start flonase daily every morning and cetirizine nightly  Hoping to avoid systemic steroids given her diabetes    Return if symptoms worsen or fail to improve.

## 2024-12-30 ENCOUNTER — OFFICE VISIT (OUTPATIENT)
Dept: FAMILY MEDICINE CLINIC | Age: 49
End: 2024-12-30

## 2024-12-30 VITALS
HEART RATE: 98 BPM | OXYGEN SATURATION: 97 % | BODY MASS INDEX: 45.99 KG/M2 | DIASTOLIC BLOOD PRESSURE: 72 MMHG | HEIGHT: 67 IN | TEMPERATURE: 98.1 F | SYSTOLIC BLOOD PRESSURE: 110 MMHG | RESPIRATION RATE: 20 BRPM | WEIGHT: 293 LBS

## 2024-12-30 DIAGNOSIS — H65.22 LEFT CHRONIC SEROUS OTITIS MEDIA: Primary | ICD-10-CM

## 2024-12-30 NOTE — PROGRESS NOTES
EAR PROBLEM VISIT  Subjective:      Chief Complaint   Patient presents with    Ear Pain     X4 days.  Lt ear pain. Had sinus issues that mostly resolved.  Symptoms started again but not as bad.      Nithya Fontanez is a 49 y.o. female who presents for possible ear infection.   Onset of symptoms was 4 days ago and  gradually worsening since that time.   Symptoms include ache. Able to sleep. Not needing pain meds.  Hearing OK.   Denies: fever    HISTORY:  Patient's medications, allergies, past medical, and social histories were reviewed and updated as appropriate.     CHART REVIEW  Health Maintenance   Topic Date Due    Diabetic foot exam  11/27/2024    Depression Screen  01/02/2025    Pneumococcal 0-64 years Vaccine (2 of 2 - PCV) 11/02/2043 (Originally 11/5/2016)    Diabetic retinal exam  02/26/2025    Lipids  05/03/2025    GFR test (Diabetes, CKD 3-4, OR last GFR 15-59)  05/03/2025    A1C test (Diabetic or Prediabetic)  07/19/2025    Diabetic Alb to Cr ratio (uACR) test  07/19/2025    Breast cancer screen  09/27/2025    Colorectal Cancer Screen  07/08/2026    Cervical cancer screen  07/19/2029    DTaP/Tdap/Td vaccine (9 - Td or Tdap) 12/27/2031    Hepatitis A vaccine  Completed    Hepatitis B vaccine  Completed    Polio vaccine  Completed    Flu vaccine  Completed    COVID-19 Vaccine  Completed    Hepatitis C screen  Completed    HIV screen  Completed    Hib vaccine  Aged Out    Meningococcal (ACWY) vaccine  Aged Out     The 10-year ASCVD risk score (Naomi BRADFORD, et al., 2019) is: 1.4%    Values used to calculate the score:      Age: 49 years      Sex: Female      Is Non- : No      Diabetic: Yes      Tobacco smoker: No      Systolic Blood Pressure: 110 mmHg      Is BP treated: Yes      HDL Cholesterol: 66 mg/dL      Total Cholesterol: 201 mg/dL  Prior to Visit Medications    Medication Sig Taking? Authorizing Provider   benzonatate (TESSALON PERLES) 100 MG capsule Take 1-2 capsules by mouth 3

## 2024-12-30 NOTE — PATIENT INSTRUCTIONS
May take ibuprofen (Motrin, Advil) 200 mg tabs up to 4 tabs every 8 hours.  May also take acetaminophen (Tylenol) as instructed on packaging.  Heating pad to lay head on if needed.  See ENT in 3 weeks.    Patient Education      OTITIS MEDIA WITH EFFUSION    Otitis media with effusion (OME) is when there is thick or sticky fluid behind the eardrum in the middle ear, but there is no ear infection.    Causes  The Eustachian tube connects the inside of the ear to the back of the throat. This tube helps drain fluids to prevent them from building up in the ear. The fluids drain from the tube and are swallowed.  Otitis media with effusion (OME) and ear infections are connected in two ways:  After most ear infections have been treated, fluid (an effusion) remains in the middle ear for a few days or weeks.   When the Eustachian tube is partially blocked, fluid builds up in the middle ear. Bacteria that are already inside the ear become trapped and begin to grow. This may lead to an ear infection.  The following can cause swelling of the lining of the Eustachian tube, leading to increased fluid:  Allergies   Irritants (especially cigarette smoke)   Respiratory infections  The following can cause the Eustachian tube to close or become blocked:  Drinking while lying on your back   Sudden increases in air pressure (such as descending in an airplane or on a mountain road)  Getting water in a baby's ears will not lead to a blocked tube.  OME is most common in winter or early spring, but it can occur at any time of year. It can affect people of any age, although it occurs most often in children under age 2. (It is rare in newborns.)  Younger children get OME more often than older children or adults for several reasons:  The tube is shorter, more horizontal, and straighter, making it easier for bacteria to enter.   The tube is floppier, with a tinier opening that's easy to block.   Young children get more colds because it takes time

## 2025-01-20 DIAGNOSIS — F43.22 ADJUSTMENT DISORDER WITH ANXIOUS MOOD: ICD-10-CM

## 2025-01-20 DIAGNOSIS — L50.5 CHOLINERGIC URTICARIA: ICD-10-CM

## 2025-01-20 DIAGNOSIS — E11.21 DIABETIC NEPHROPATHY WITH PROTEINURIA (HCC): ICD-10-CM

## 2025-01-20 DIAGNOSIS — E11.21 TYPE 2 DIABETES MELLITUS WITH DIABETIC NEPHROPATHY, WITHOUT LONG-TERM CURRENT USE OF INSULIN (HCC): ICD-10-CM

## 2025-01-20 DIAGNOSIS — I10 HYPERTENSION, ESSENTIAL: ICD-10-CM

## 2025-01-20 RX ORDER — MONTELUKAST SODIUM 10 MG/1
TABLET ORAL
Qty: 90 TABLET | Refills: 1 | Status: SHIPPED | OUTPATIENT
Start: 2025-01-20

## 2025-01-20 RX ORDER — METFORMIN HYDROCHLORIDE 500 MG/1
TABLET, EXTENDED RELEASE ORAL
Qty: 360 TABLET | Refills: 1 | Status: SHIPPED | OUTPATIENT
Start: 2025-01-20

## 2025-01-20 RX ORDER — LOSARTAN POTASSIUM 50 MG/1
TABLET ORAL
Qty: 90 TABLET | Refills: 1 | Status: SHIPPED | OUTPATIENT
Start: 2025-01-20

## 2025-01-25 DIAGNOSIS — E66.01 OBESITY, CLASS III, BMI 40-49.9 (MORBID OBESITY): ICD-10-CM

## 2025-01-25 DIAGNOSIS — E11.21 TYPE 2 DIABETES MELLITUS WITH DIABETIC NEPHROPATHY, WITHOUT LONG-TERM CURRENT USE OF INSULIN (HCC): ICD-10-CM

## 2025-01-27 RX ORDER — TIRZEPATIDE 10 MG/.5ML
INJECTION, SOLUTION SUBCUTANEOUS
Qty: 2 ML | Refills: 2 | Status: SHIPPED | OUTPATIENT
Start: 2025-01-27

## 2025-02-08 ASSESSMENT — SLEEP AND FATIGUE QUESTIONNAIRES
HOW LIKELY ARE YOU TO NOD OFF OR FALL ASLEEP IN A CAR, WHILE STOPPED FOR A FEW MINUTES IN TRAFFIC: WOULD NEVER DOZE
HOW LIKELY ARE YOU TO NOD OFF OR FALL ASLEEP WHILE LYING DOWN TO REST IN THE AFTERNOON WHEN CIRCUMSTANCES PERMIT: HIGH CHANCE OF DOZING
HOW LIKELY ARE YOU TO NOD OFF OR FALL ASLEEP WHILE WATCHING TV: SLIGHT CHANCE OF DOZING
HOW LIKELY ARE YOU TO NOD OFF OR FALL ASLEEP IN A CAR, WHILE STOPPED FOR A FEW MINUTES IN TRAFFIC: WOULD NEVER DOZE
HOW LIKELY ARE YOU TO NOD OFF OR FALL ASLEEP WHILE SITTING AND READING: SLIGHT CHANCE OF DOZING
HOW LIKELY ARE YOU TO NOD OFF OR FALL ASLEEP WHILE LYING DOWN TO REST IN THE AFTERNOON WHEN CIRCUMSTANCES PERMIT: HIGH CHANCE OF DOZING
HOW LIKELY ARE YOU TO NOD OFF OR FALL ASLEEP WHEN YOU ARE A PASSENGER IN A CAR FOR AN HOUR WITHOUT A BREAK: SLIGHT CHANCE OF DOZING
HOW LIKELY ARE YOU TO NOD OFF OR FALL ASLEEP WHILE SITTING AND TALKING TO SOMEONE: WOULD NEVER DOZE
ESS TOTAL SCORE: 7
HOW LIKELY ARE YOU TO NOD OFF OR FALL ASLEEP WHILE SITTING QUIETLY AFTER LUNCH WITHOUT ALCOHOL: SLIGHT CHANCE OF DOZING
HOW LIKELY ARE YOU TO NOD OFF OR FALL ASLEEP WHILE SITTING AND READING: SLIGHT CHANCE OF DOZING
HOW LIKELY ARE YOU TO NOD OFF OR FALL ASLEEP WHILE SITTING INACTIVE IN A PUBLIC PLACE: WOULD NEVER DOZE
HOW LIKELY ARE YOU TO NOD OFF OR FALL ASLEEP WHEN YOU ARE A PASSENGER IN A CAR FOR AN HOUR WITHOUT A BREAK: SLIGHT CHANCE OF DOZING
HOW LIKELY ARE YOU TO NOD OFF OR FALL ASLEEP WHILE SITTING QUIETLY AFTER LUNCH WITHOUT ALCOHOL: SLIGHT CHANCE OF DOZING
HOW LIKELY ARE YOU TO NOD OFF OR FALL ASLEEP WHILE SITTING AND TALKING TO SOMEONE: WOULD NEVER DOZE
HOW LIKELY ARE YOU TO NOD OFF OR FALL ASLEEP WHILE SITTING INACTIVE IN A PUBLIC PLACE: WOULD NEVER DOZE
HOW LIKELY ARE YOU TO NOD OFF OR FALL ASLEEP WHILE WATCHING TV: SLIGHT CHANCE OF DOZING

## 2025-02-11 ENCOUNTER — TELEMEDICINE (OUTPATIENT)
Dept: PULMONOLOGY | Age: 50
End: 2025-02-11
Payer: COMMERCIAL

## 2025-02-11 DIAGNOSIS — G25.81 RLS (RESTLESS LEGS SYNDROME): ICD-10-CM

## 2025-02-11 DIAGNOSIS — E11.21 TYPE 2 DIABETES MELLITUS WITH DIABETIC NEPHROPATHY, WITHOUT LONG-TERM CURRENT USE OF INSULIN (HCC): Chronic | ICD-10-CM

## 2025-02-11 DIAGNOSIS — E66.01 OBESITY, CLASS III, BMI 40-49.9 (MORBID OBESITY): Chronic | ICD-10-CM

## 2025-02-11 DIAGNOSIS — I10 HYPERTENSION, ESSENTIAL: ICD-10-CM

## 2025-02-11 DIAGNOSIS — G47.33 OBSTRUCTIVE APNEA: Primary | Chronic | ICD-10-CM

## 2025-02-11 PROCEDURE — 99214 OFFICE O/P EST MOD 30 MIN: CPT | Performed by: NURSE PRACTITIONER

## 2025-02-11 PROCEDURE — G2211 COMPLEX E/M VISIT ADD ON: HCPCS | Performed by: NURSE PRACTITIONER

## 2025-02-11 NOTE — ASSESSMENT & PLAN NOTE
Chronic-Stable: Reviewed and analyzed results of physiologic download from patient's machine and reviewed with patient.  Supplies and parts as needed for her machine.  These are medically necessary.  Limit caffeine use after 3pm. Based on the analyzed data will change following settings: P min increased to 11. Changes sent via machine modem.  Stable on her machine at current settings, getting benefit from the use, and having minimal side effects. Will trial pressure increase for RLS. Discussed seeing her back sooner to evaluate symptoms and adjustments to her machine however she declines at this time and would like to schedule follow up for 1 year. Encouraged her to contact the office with any questions or concerns.

## 2025-02-11 NOTE — PROGRESS NOTES
Diagnosis: [x] SARA (G47.33) [] CSA (G47.31) [] Apnea (G47.30)   Length of Need: [x] 15 Months [] 99 Months [] Other:   Machine (MARISSA!): [] Respironics Dream Station      Auto [] ResMed AirSense     Auto [] Other:     []  CPAP () [] Bilevel ()   Mode: [] Auto [] Spontaneous    Mode: [] Auto [] Spontaneous             Comfort Settings:      Humidifier: [] Heated ()        [x] Water chamber replacement ()/ 1 per 6 months        Mask:   [] Nasal () /1 per 3 months [x] Full Face () /1 per 3 months   [] Patient choice -Size and fit mask [x] Patient Choice - Size and fit mask   [] Dispense: [] Dispense:   [] Headgear () / 1 per 3 months [x] Headgear () / 1 per 3 months   [] Replacement Nasal Cushion ()/2 per month [x] Interface Replacement ()/1 per month   [] Replacement Nasal Pillows ()/2 per month         Tubing: [x] Heated ()/1 per 3 months    [] Standard ()/1 per 3 months [] Other:           Filters: [x] Non-disposable ()/1 per 6 months     [x] Ultra-Fine, Disposable ()/2 per month        Miscellaneous: [] Chin Strap ()/ 1 per 6 months [] O2 bleed-in:        LPM   [] Oxymetry on CPAP/Bilevel []  Other:         Start Order Date: 02/11/25    MEDICAL JUSTIFICATION:  I, the undersigned, certify that the above prescribed supplies are medically necessary for this patient’s wellbeing.  In my opinion, the supplies are both reasonable and necessary in reference to accepted standards of medicalpractice in treatment of this patient’s condition.    STEPHANIE SHORT NP    NPI: [id number]       Order Signed Date: 02/11/25  [facility]  Pulmonary, Sleep, and Critical Care    Pulmonary, Sleep, and Critical Care  [facility] [address]                          [address] [place] [address] [address]  [place] [address]                                    [place] [address]  Phone: [phone]    Fax:

## 2025-02-11 NOTE — ASSESSMENT & PLAN NOTE
Chronic- Stable.  Discussed the importance of treating obstructive sleep apnea as part of the management of this disorder.  Cont any meds per PCP and other physicians. Will check for secondary causes of RLS (hypothyroid, low Fe, renal dysfunction).  Discussed avoiding caffeine and nicotine.  Discussed working aerobic exersice and avoiding meds that can exacerbate RLS. Will trial pressure increase on her machine.

## 2025-02-11 NOTE — PROGRESS NOTES
[facility]  [address]  [address]  [place] [address]  P- [phone]  F- [phone]   Video Visit- Follow up      Assessment/Plan:      1. Obstructive apnea- CPAP  Assessment & Plan:  Chronic-Stable: Reviewed and analyzed results of physiologic download from patient's machine and reviewed with patient.  Supplies and parts as needed for her machine.  These are medically necessary.  Limit caffeine use after 3pm. Based on the analyzed data will change following settings: P min increased to 11. Changes sent via machine modem.  Stable on her machine at current settings, getting benefit from the use, and having minimal side effects. Will trial pressure increase for RLS. Discussed seeing her back sooner to evaluate symptoms and adjustments to her machine however she declines at this time and would like to schedule follow up for 1 year. Encouraged her to contact the office with any questions or concerns.    Orders:  -     Ferritin; Future  -     Iron and TIBC; Future  -     TSH reflex to FT4; Future  -     Renal Panel; Future  2. Hypertension, essential  Assessment & Plan:  Chronic- Stable.  Discussed the importance of treating obstructive sleep apnea as part of the management of this disorder.  Cont any meds per PCP and other physicians.    3. Type 2 diabetes mellitus with diabetic nephropathy, without long-term current use of insulin (HCC)  Assessment & Plan:  Chronic- Stable.  Discussed the importance of treating obstructive sleep apnea as part of the management of this disorder.  Cont any meds per PCP and other physicians.    4. Obesity, Class III, BMI 40-49.9 (morbid obesity)  Assessment & Plan:  Chronic-not stable:  Discussed importance of treating obstructive sleep apnea and getting sufficient sleep to assist with weight control.  Discussed weight gain and/or weight loss may require adjustments to machine settings. Encouraged her to work on weight loss

## 2025-02-24 DIAGNOSIS — G25.81 RLS (RESTLESS LEGS SYNDROME): ICD-10-CM

## 2025-02-24 DIAGNOSIS — G47.33 OBSTRUCTIVE APNEA: Chronic | ICD-10-CM

## 2025-03-10 SDOH — ECONOMIC STABILITY: TRANSPORTATION INSECURITY
IN THE PAST 12 MONTHS, HAS THE LACK OF TRANSPORTATION KEPT YOU FROM MEDICAL APPOINTMENTS OR FROM GETTING MEDICATIONS?: NO

## 2025-03-10 SDOH — ECONOMIC STABILITY: INCOME INSECURITY: IN THE LAST 12 MONTHS, WAS THERE A TIME WHEN YOU WERE NOT ABLE TO PAY THE MORTGAGE OR RENT ON TIME?: NO

## 2025-03-10 SDOH — ECONOMIC STABILITY: FOOD INSECURITY: WITHIN THE PAST 12 MONTHS, YOU WORRIED THAT YOUR FOOD WOULD RUN OUT BEFORE YOU GOT MONEY TO BUY MORE.: NEVER TRUE

## 2025-03-10 SDOH — ECONOMIC STABILITY: FOOD INSECURITY: WITHIN THE PAST 12 MONTHS, THE FOOD YOU BOUGHT JUST DIDN'T LAST AND YOU DIDN'T HAVE MONEY TO GET MORE.: NEVER TRUE

## 2025-03-10 ASSESSMENT — PATIENT HEALTH QUESTIONNAIRE - PHQ9
SUM OF ALL RESPONSES TO PHQ QUESTIONS 1-9: 0
1. LITTLE INTEREST OR PLEASURE IN DOING THINGS: NOT AT ALL
SUM OF ALL RESPONSES TO PHQ QUESTIONS 1-9: 0
SUM OF ALL RESPONSES TO PHQ QUESTIONS 1-9: 0
1. LITTLE INTEREST OR PLEASURE IN DOING THINGS: NOT AT ALL
2. FEELING DOWN, DEPRESSED OR HOPELESS: NOT AT ALL
2. FEELING DOWN, DEPRESSED OR HOPELESS: NOT AT ALL
SUM OF ALL RESPONSES TO PHQ QUESTIONS 1-9: 0
SUM OF ALL RESPONSES TO PHQ9 QUESTIONS 1 & 2: 0

## 2025-03-11 ENCOUNTER — OFFICE VISIT (OUTPATIENT)
Dept: FAMILY MEDICINE CLINIC | Age: 50
End: 2025-03-11
Payer: COMMERCIAL

## 2025-03-11 VITALS
SYSTOLIC BLOOD PRESSURE: 120 MMHG | HEART RATE: 96 BPM | WEIGHT: 292.2 LBS | BODY MASS INDEX: 45.86 KG/M2 | DIASTOLIC BLOOD PRESSURE: 82 MMHG | HEIGHT: 67 IN | OXYGEN SATURATION: 98 %

## 2025-03-11 DIAGNOSIS — H81.11 BENIGN PAROXYSMAL POSITIONAL VERTIGO OF RIGHT EAR: ICD-10-CM

## 2025-03-11 DIAGNOSIS — E78.5 HYPERLIPIDEMIA LDL GOAL <100: ICD-10-CM

## 2025-03-11 DIAGNOSIS — M79.671 RIGHT FOOT PAIN: ICD-10-CM

## 2025-03-11 DIAGNOSIS — Z77.011 LEAD EXPOSURE: ICD-10-CM

## 2025-03-11 DIAGNOSIS — I10 HYPERTENSION, ESSENTIAL: ICD-10-CM

## 2025-03-11 DIAGNOSIS — G25.81 RLS (RESTLESS LEGS SYNDROME): ICD-10-CM

## 2025-03-11 DIAGNOSIS — D50.9 MICROCYTIC ANEMIA: ICD-10-CM

## 2025-03-11 DIAGNOSIS — E11.21 TYPE 2 DIABETES MELLITUS WITH DIABETIC NEPHROPATHY, WITHOUT LONG-TERM CURRENT USE OF INSULIN (HCC): Primary | Chronic | ICD-10-CM

## 2025-03-11 LAB — HBA1C MFR BLD: 6.4 %

## 2025-03-11 PROCEDURE — 3079F DIAST BP 80-89 MM HG: CPT

## 2025-03-11 PROCEDURE — 3044F HG A1C LEVEL LT 7.0%: CPT

## 2025-03-11 PROCEDURE — 99214 OFFICE O/P EST MOD 30 MIN: CPT

## 2025-03-11 PROCEDURE — 3074F SYST BP LT 130 MM HG: CPT

## 2025-03-11 PROCEDURE — 83036 HEMOGLOBIN GLYCOSYLATED A1C: CPT

## 2025-03-11 PROCEDURE — G2211 COMPLEX E/M VISIT ADD ON: HCPCS

## 2025-03-11 ASSESSMENT — ENCOUNTER SYMPTOMS
SHORTNESS OF BREATH: 0
ABDOMINAL PAIN: 0

## 2025-03-11 NOTE — PROGRESS NOTES
3/11/2025    This is a 49 y.o. female   Chief Complaint   Patient presents with    Diabetes     Patient here to discuss Misc issues and to follow up on Dm.  Would like to discuss vaccine boosters      Discuss Labs     Had blood work done iron still low would like to discuss getting infusions    .    EMANUEL    Nithya is here for routine follow up on chronic conditions.    Concerns: she is wondering if there are any vaccine boosters recommended for her    Iron deficiency- has been iron deficient for a long time, taking PO iron supplement but iron sats are still low.  She has significant RLS that she feels is related to this.    She is worried about lead exposure- had increased her psyllium but recently heard that it is often contaminated with lead.  Also found out that a mug that she uses was made of lead.    She has been having some intermittent dizzy spells.  Notices them with position changes.  Not having low blood sugar or low blood pressure when they happen.  She has had to do the epley maneuver in the past with resolution of symptoms.  She has not tried that recently    Two years ago she had foot surgery- recently tried to wear boots with a small heel and after that she has had persistent pain in the middle of her right foot with weight bearing.  No bruising or swelling.    Diabetes Mellitus Type 2: Current symptoms/problems include none.    Home blood sugar records: patient does not test  Any episodes of hypoglycemia? no  Eye exam current (within one year): yes  Tobacco history: She  reports that she has never smoked. She has never been exposed to tobacco smoke. She has never used smokeless tobacco.   Mounjaro 10 mg weekly  2000 mg metformin daily    Hypertension:  Home blood pressure monitoring: No.  She is not adherent to a low sodium diet. Patient denies chest pain, shortness of breath, headache, lightheadedness, blurred vision, peripheral edema, palpitations, dry cough, and fatigue.  Antihypertensive medication

## 2025-03-21 ENCOUNTER — HOSPITAL ENCOUNTER (OUTPATIENT)
Dept: GENERAL RADIOLOGY | Age: 50
Discharge: HOME | End: 2025-03-21
Payer: COMMERCIAL

## 2025-03-21 ENCOUNTER — HOSPITAL ENCOUNTER (OUTPATIENT)
Age: 50
Discharge: HOME | End: 2025-03-21
Payer: COMMERCIAL

## 2025-03-21 DIAGNOSIS — M79.671 RIGHT FOOT PAIN: ICD-10-CM

## 2025-03-21 PROCEDURE — 73620 X-RAY EXAM OF FOOT: CPT

## 2025-03-24 ENCOUNTER — RESULTS FOLLOW-UP (OUTPATIENT)
Dept: GENERAL RADIOLOGY | Age: 50
End: 2025-03-24

## 2025-04-13 DIAGNOSIS — E11.21 TYPE 2 DIABETES MELLITUS WITH DIABETIC NEPHROPATHY, WITHOUT LONG-TERM CURRENT USE OF INSULIN (HCC): ICD-10-CM

## 2025-04-13 DIAGNOSIS — L50.5 CHOLINERGIC URTICARIA: ICD-10-CM

## 2025-04-13 DIAGNOSIS — E66.813 OBESITY, CLASS III, BMI 40-49.9 (MORBID OBESITY): ICD-10-CM

## 2025-04-14 RX ORDER — MONTELUKAST SODIUM 10 MG/1
TABLET ORAL
Qty: 90 TABLET | Refills: 1 | Status: SHIPPED | OUTPATIENT
Start: 2025-04-14

## 2025-04-14 RX ORDER — TIRZEPATIDE 10 MG/.5ML
INJECTION, SOLUTION SUBCUTANEOUS
Qty: 2 ML | Refills: 2 | Status: SHIPPED | OUTPATIENT
Start: 2025-04-14

## 2025-04-22 ENCOUNTER — PATIENT MESSAGE (OUTPATIENT)
Dept: FAMILY MEDICINE CLINIC | Age: 50
End: 2025-04-22

## 2025-04-26 DIAGNOSIS — Z77.011 LEAD EXPOSURE: ICD-10-CM

## 2025-04-26 DIAGNOSIS — E78.5 HYPERLIPIDEMIA LDL GOAL <100: ICD-10-CM

## 2025-04-26 DIAGNOSIS — E11.21 TYPE 2 DIABETES MELLITUS WITH DIABETIC NEPHROPATHY, WITHOUT LONG-TERM CURRENT USE OF INSULIN (HCC): Chronic | ICD-10-CM

## 2025-04-26 DIAGNOSIS — I10 HYPERTENSION, ESSENTIAL: ICD-10-CM

## 2025-04-26 LAB
ALBUMIN SERPL-MCNC: 3.9 G/DL (ref 3.4–5)
ALBUMIN/GLOB SERPL: 1.1 {RATIO} (ref 1.1–2.2)
ALP SERPL-CCNC: 121 U/L (ref 40–129)
ALT SERPL-CCNC: 18 U/L (ref 10–40)
ANION GAP SERPL CALCULATED.3IONS-SCNC: 11 MMOL/L (ref 3–16)
AST SERPL-CCNC: 18 U/L (ref 15–37)
BILIRUB SERPL-MCNC: 0.5 MG/DL (ref 0–1)
BUN SERPL-MCNC: 13 MG/DL (ref 7–20)
CALCIUM SERPL-MCNC: 9.5 MG/DL (ref 8.3–10.6)
CHLORIDE SERPL-SCNC: 102 MMOL/L (ref 99–110)
CHOLEST SERPL-MCNC: 181 MG/DL (ref 0–199)
CO2 SERPL-SCNC: 21 MMOL/L (ref 21–32)
CREAT SERPL-MCNC: 0.6 MG/DL (ref 0.6–1.1)
GFR SERPLBLD CREATININE-BSD FMLA CKD-EPI: >90 ML/MIN/{1.73_M2}
GLUCOSE SERPL-MCNC: 125 MG/DL (ref 70–99)
HDLC SERPL-MCNC: 62 MG/DL (ref 40–60)
LDLC SERPL CALC-MCNC: 101 MG/DL
POTASSIUM SERPL-SCNC: 4.3 MMOL/L (ref 3.5–5.1)
PROT SERPL-MCNC: 7.3 G/DL (ref 6.4–8.2)
SODIUM SERPL-SCNC: 134 MMOL/L (ref 136–145)
TRIGL SERPL-MCNC: 88 MG/DL (ref 0–150)
VLDLC SERPL CALC-MCNC: 18 MG/DL

## 2025-04-28 ENCOUNTER — RESULTS FOLLOW-UP (OUTPATIENT)
Dept: FAMILY MEDICINE CLINIC | Age: 50
End: 2025-04-28

## 2025-04-28 LAB — LEAD BLD-MCNC: <2 UG/DL

## 2025-04-28 RX ORDER — PANTOPRAZOLE SODIUM 40 MG/1
40 TABLET, DELAYED RELEASE ORAL 2 TIMES DAILY
Qty: 180 TABLET | Refills: 1 | Status: SHIPPED | OUTPATIENT
Start: 2025-04-28

## 2025-05-03 DIAGNOSIS — F43.22 ADJUSTMENT DISORDER WITH ANXIOUS MOOD: ICD-10-CM

## 2025-05-21 ENCOUNTER — TELEPHONE (OUTPATIENT)
Dept: ADMINISTRATIVE | Age: 50
End: 2025-05-21

## 2025-05-21 NOTE — TELEPHONE ENCOUNTER
Submitted PA for Mounjaro 10MG/0.5ML auto-injectors  Via [facility] Key: [id number] STATUS: PENDING.    Follow up done daily; if no decision with in three days we will refax.  If another three days goes by with no decision will call the insurance for status.

## 2025-05-22 NOTE — TELEPHONE ENCOUNTER
DENIAL for Mounjaro 10MG/0.5ML auto-injectors; see letter attached in this encounter and under the Media tab for details.    If this requires a response please respond to the pool ( P [facility] MEDICATION PRE-AUTH).      Thank you please advise patient.

## 2025-05-30 ENCOUNTER — TELEPHONE (OUTPATIENT)
Dept: FAMILY MEDICINE CLINIC | Age: 50
End: 2025-05-30

## 2025-05-30 ENCOUNTER — TELEMEDICINE (OUTPATIENT)
Dept: FAMILY MEDICINE CLINIC | Age: 50
End: 2025-05-30

## 2025-05-30 DIAGNOSIS — E11.21 TYPE 2 DIABETES MELLITUS WITH DIABETIC NEPHROPATHY, WITHOUT LONG-TERM CURRENT USE OF INSULIN (HCC): ICD-10-CM

## 2025-05-30 DIAGNOSIS — E66.813 OBESITY, CLASS III, BMI 40-49.9 (MORBID OBESITY) (HCC): ICD-10-CM

## 2025-05-30 RX ORDER — ERGOCALCIFEROL 1.25 MG/1
50000 CAPSULE, LIQUID FILLED ORAL WEEKLY
COMMUNITY
Start: 2025-05-13

## 2025-05-30 RX ORDER — TIRZEPATIDE 10 MG/.5ML
INJECTION, SOLUTION SUBCUTANEOUS
Qty: 2 ML | Refills: 2 | Status: SHIPPED | OUTPATIENT
Start: 2025-05-30

## 2025-05-30 ASSESSMENT — ENCOUNTER SYMPTOMS
ABDOMINAL PAIN: 0
SHORTNESS OF BREATH: 0

## 2025-05-30 NOTE — TELEPHONE ENCOUNTER
We did PA for mounjaro and it was not approved insurance wants SGLT2 but pt gets yeast infections from those medicaions.  Can we do PA  ASAP pt is missing dose and has been on this for a while.

## 2025-05-30 NOTE — PROGRESS NOTES
Nithya Fontanez (:  1975) is a Established patient, here for evaluation of the following:    Discuss Medications      Assessment & Plan   Below is the assessment and plan developed based on review of pertinent history, physical exam, labs, studies, and medications.  1. Obesity, Class III, BMI 40-49.9 (morbid obesity) (Formerly [facility])  -     Tirzepatide (MOUNJARO) 10 MG/0.5ML SOAJ pen; ADMINISTER 10 MG UNDER THE SKIN 1 TIME A WEEK, Disp-2 mL, R-2Normal  2. Type 2 diabetes mellitus with diabetic nephropathy, without long-term current use of insulin (Formerly [facility])  -     Tirzepatide (MOUNJARO) 10 MG/0.5ML SOAJ pen; ADMINISTER 10 MG UNDER THE SKIN 1 TIME A WEEK, Disp-2 mL, R-2Normal  Nithya has maintained good control of her diabetes since starting on Mounjaro  She is tolerating the medication without side effects  I feel that Mounjaro is the best medication for DM management for this patient  She is not a candidate for SGLT2 therapy given recurrent urogenital infections  I will resend for PA in hopes that her insurance company will reconsider  Return in 7 weeks (on 7/15/2025).       Subjective   HPI    Nithya is here to discuss concerns regarding insurance coverage of her Mounjaro.  Got new insurance and mounjaro has been denied unless she has tried an SGLT2 medication  Nithya is not a candidate for treatment with SGLT2 medication due to chronic urogenital infections   She had been on Trulicity up until 2024 and was unable to get control of A1C.  She was switched to Mounjaro in 2024 and has had much better control of her blood sugars and A1c has significantly improved.    Last dose of mounjaro  was 2 Sundays ago  Fasting blood sugars have been <130    Review of Systems   Constitutional:  Negative for activity change and fever.   Respiratory:  Negative for shortness of breath.    Cardiovascular:  Negative for chest pain, palpitations and leg swelling.   Gastrointestinal:  Negative for abdominal pain.   Neurological:

## 2025-06-05 NOTE — TELEPHONE ENCOUNTER
An Appeal has been faxed in for Mounjaro to [employer] at fax # [phone]. Call to check status [phone]. Appeals normally take 30 days to come back with a decision, but follow up will be done every 10 days.    If no response by the 30th day, then the insurance will be called to check status.    If this requires a response please respond to the pool ( P [facility] MEDICATION PRE-AUTH).      Thank you please advise patient.

## 2025-06-30 DIAGNOSIS — E11.21 TYPE 2 DIABETES MELLITUS WITH DIABETIC NEPHROPATHY, WITHOUT LONG-TERM CURRENT USE OF INSULIN (HCC): ICD-10-CM

## 2025-06-30 DIAGNOSIS — E66.813 OBESITY, CLASS III, BMI 40-49.9 (MORBID OBESITY) (HCC): ICD-10-CM

## 2025-07-01 RX ORDER — TIRZEPATIDE 10 MG/.5ML
INJECTION, SOLUTION SUBCUTANEOUS
Qty: 2 ML | Refills: 2 | Status: SHIPPED | OUTPATIENT
Start: 2025-07-01

## 2025-07-15 ENCOUNTER — OFFICE VISIT (OUTPATIENT)
Dept: FAMILY MEDICINE CLINIC | Age: 50
End: 2025-07-15
Payer: COMMERCIAL

## 2025-07-15 VITALS
WEIGHT: 284 LBS | HEART RATE: 90 BPM | SYSTOLIC BLOOD PRESSURE: 120 MMHG | HEIGHT: 67 IN | DIASTOLIC BLOOD PRESSURE: 80 MMHG | BODY MASS INDEX: 44.57 KG/M2 | OXYGEN SATURATION: 98 %

## 2025-07-15 DIAGNOSIS — I10 HYPERTENSION, ESSENTIAL: ICD-10-CM

## 2025-07-15 DIAGNOSIS — E78.5 HYPERLIPIDEMIA LDL GOAL <100: ICD-10-CM

## 2025-07-15 DIAGNOSIS — K21.00 GASTROESOPHAGEAL REFLUX DISEASE WITH ESOPHAGITIS WITHOUT HEMORRHAGE: Chronic | ICD-10-CM

## 2025-07-15 DIAGNOSIS — E11.21 TYPE 2 DIABETES MELLITUS WITH DIABETIC NEPHROPATHY, WITHOUT LONG-TERM CURRENT USE OF INSULIN (HCC): Primary | Chronic | ICD-10-CM

## 2025-07-15 DIAGNOSIS — F41.9 ANXIETY: ICD-10-CM

## 2025-07-15 PROBLEM — A41.9 SEPSIS (HCC): Status: RESOLVED | Noted: 2023-01-24 | Resolved: 2025-07-15

## 2025-07-15 PROBLEM — R10.9 ABDOMINAL PAIN: Status: RESOLVED | Noted: 2023-01-24 | Resolved: 2025-07-15

## 2025-07-15 LAB
CREAT UR-MCNC: 209 MG/DL (ref 28–259)
HBA1C MFR BLD: 5.9 %
MICROALBUMIN UR DL<=1MG/L-MCNC: <1.2 MG/DL
MICROALBUMIN/CREAT UR: NORMAL MG/G (ref 0–30)

## 2025-07-15 PROCEDURE — 3074F SYST BP LT 130 MM HG: CPT

## 2025-07-15 PROCEDURE — 3044F HG A1C LEVEL LT 7.0%: CPT

## 2025-07-15 PROCEDURE — 99214 OFFICE O/P EST MOD 30 MIN: CPT

## 2025-07-15 PROCEDURE — 83036 HEMOGLOBIN GLYCOSYLATED A1C: CPT

## 2025-07-15 PROCEDURE — 3079F DIAST BP 80-89 MM HG: CPT

## 2025-07-15 PROCEDURE — G2211 COMPLEX E/M VISIT ADD ON: HCPCS

## 2025-07-15 ASSESSMENT — ENCOUNTER SYMPTOMS
SHORTNESS OF BREATH: 0
ABDOMINAL PAIN: 0

## 2025-07-15 NOTE — PROGRESS NOTES
7/15/2025    This is a 49 y.o. female   Chief Complaint   Patient presents with    Diabetes     4 mo f/u DM   .    EMANUEL Barriga is here for routine follow up on chronic conditions    Concerns: prior auth issues for mounjaro.  Cannot take SGLT2 medications due to chronic/recurrent urogenital infections.    Diabetes Mellitus Type 2: Current symptoms/problems include none.  Home blood sugar records: not regularly  Any episodes of hypoglycemia? no  Eye exam current (within one year): yes   reports that she has never smoked. She has never been exposed to tobacco smoke. She has never used smokeless tobacco.     Lab Results   Component Value Date    LABA1C 5.9 07/15/2025    LABA1C 6.4 03/11/2025    LABA1C 7.3 07/19/2024     Lab Results   Component Value Date    CREATININE 0.6 04/26/2025     Lab Results   Component Value Date    ALT 18 04/26/2025    AST 18 04/26/2025     Lab Results   Component Value Date    CHOL 181 04/26/2025    TRIG 88 04/26/2025    HDL 62 (H) 04/26/2025        Hypertension:  Home blood pressure monitoring: No.  She is not adherent to a low sodium diet. Patient denies chest pain, shortness of breath, headache, blurred vision, peripheral edema, palpitations, dry cough, and fatigue.  Antihypertensive medication side effects: lightheadedness but is manageable - believes it is due to her anemia more than her blood pressure.  Use of agents associated with hypertension: none.                                        Sodium (mmol/L)   Date Value   04/26/2025 134 (L)    BUN (mg/dL)   Date Value   04/26/2025 13    Glucose (mg/dL)   Date Value   04/26/2025 125 (H)      Potassium (mmol/L)   Date Value   04/26/2025 4.3     Potassium reflex Magnesium (mmol/L)   Date Value   01/24/2023 4.3    Creatinine (mg/dL)   Date Value   04/26/2025 0.6         Hyperlipidemia:  Patient is not following a low fat, low cholesterol diet.  She is not exercising regularly. OTC Supplements: none.    Lab Results   Component Value Date

## 2025-07-22 ENCOUNTER — TELEPHONE (OUTPATIENT)
Dept: FAMILY MEDICINE CLINIC | Age: 50
End: 2025-07-22

## 2025-07-22 DIAGNOSIS — E11.21 TYPE 2 DIABETES MELLITUS WITH DIABETIC NEPHROPATHY, WITHOUT LONG-TERM CURRENT USE OF INSULIN (HCC): ICD-10-CM

## 2025-07-22 DIAGNOSIS — I10 HYPERTENSION, ESSENTIAL: ICD-10-CM

## 2025-07-22 DIAGNOSIS — E66.813 OBESITY, CLASS III, BMI 40-49.9 (MORBID OBESITY) (HCC): Chronic | ICD-10-CM

## 2025-07-22 DIAGNOSIS — E11.21 DIABETIC NEPHROPATHY WITH PROTEINURIA (HCC): ICD-10-CM

## 2025-07-22 RX ORDER — LOSARTAN POTASSIUM 50 MG/1
50 TABLET ORAL EVERY MORNING
Qty: 90 TABLET | Refills: 1 | Status: SHIPPED | OUTPATIENT
Start: 2025-07-22

## 2025-07-22 RX ORDER — METFORMIN HYDROCHLORIDE 500 MG/1
TABLET, EXTENDED RELEASE ORAL
Qty: 360 TABLET | Refills: 1 | Status: SHIPPED | OUTPATIENT
Start: 2025-07-22

## 2025-07-22 NOTE — TELEPHONE ENCOUNTER
Can we appeal this decision for the patient     Could you send them a PA indicating that I was on Mounjaro 10 mg and need to continue that treatment but that we understand they require me to try Ozempic first, that I am already on Metformin and that it is insufficient, that I tried Trulicity but that was insufficient, and that Victoza is contraindicated? And since they will still not cover Mounjaro, we need them to authorize Ozempic. They need that faxed to [phone]. If you let me know when that is faxed, I can then give the insurer the 24 hrs they want to process it (and not a minute more) and follow up on it. If you could also submit that rx for Ozempic to [facility], I can start checking with them to see if it goes through. Thank you!!

## 2025-07-23 NOTE — TELEPHONE ENCOUNTER
There was a PA received VIA PT ADVICE REQUEST FOR OZEMPIC, but there is not a current RX on file.    If you want PA please submit new RX, and resend this PA request back to the pool    If this requires a response please respond to the pool ( P [facility] MEDICATION PRE-AUTH).      Thank you please advise patient.

## 2025-07-24 NOTE — TELEPHONE ENCOUNTER
The ozempic dose that is pending is for the max 2 mg weekly. If she has not been on this medication before and has been taking mounjaro, then she would need to start at a lesser dose of the ozempic. Either 0.25 or 0.5. Would need to titrate up monthly as tolerated.

## 2025-07-24 NOTE — TELEPHONE ENCOUNTER
LVM for patient to call back to let her know that she will have to start on the lower dose of Ozempic.

## 2025-07-25 RX ORDER — SEMAGLUTIDE 0.68 MG/ML
0.5 INJECTION, SOLUTION SUBCUTANEOUS
Qty: 3 ML | Refills: 0 | Status: SHIPPED | OUTPATIENT
Start: 2025-07-25 | End: 2025-07-31 | Stop reason: SDUPTHER

## 2025-07-28 DIAGNOSIS — J45.20 MILD INTERMITTENT ASTHMA WITHOUT COMPLICATION: Chronic | ICD-10-CM

## 2025-07-28 RX ORDER — FLUTICASONE PROPIONATE AND SALMETEROL XINAFOATE 115; 21 UG/1; UG/1
AEROSOL, METERED RESPIRATORY (INHALATION)
Qty: 12 G | Refills: 5 | Status: SHIPPED | OUTPATIENT
Start: 2025-07-28 | End: 2025-07-29

## 2025-07-29 RX ORDER — FLUTICASONE PROPIONATE AND SALMETEROL XINAFOATE 115; 21 UG/1; UG/1
AEROSOL, METERED RESPIRATORY (INHALATION)
Qty: 36 G | Refills: 4 | Status: SHIPPED | OUTPATIENT
Start: 2025-07-29

## 2025-07-29 NOTE — TELEPHONE ENCOUNTER
Submitted PA for Semaglutide,0.25 or 0.5MG/DOS, (OZEMPIC, 0.25 OR 0.5 MG/DOSE,) 2 MG/3ML SOPN   Via [facility](Key: [id number])STATUS: PENDING.    Follow up done daily; if no decision with in three days we will refax.  If another three days goes by with no decision will call the insurance for status.

## 2025-07-30 ENCOUNTER — PATIENT MESSAGE (OUTPATIENT)
Dept: FAMILY MEDICINE CLINIC | Age: 50
End: 2025-07-30

## 2025-07-30 NOTE — TELEPHONE ENCOUNTER
The medication Ozempic 0.25 or 0.5mg/dose inj is APPROVED from 07/29/2025 to 07/29/2026.    Auth / Case # [id number]    Please notify the patient.    If this requires a response please respond to the pool ( P [facility] MEDICATION PRE-AUTH).

## 2025-07-31 RX ORDER — SEMAGLUTIDE 0.68 MG/ML
0.5 INJECTION, SOLUTION SUBCUTANEOUS
Qty: 3 ML | Refills: 0 | Status: SHIPPED | OUTPATIENT
Start: 2025-07-31 | End: 2025-08-28

## 2025-08-04 ENCOUNTER — PATIENT MESSAGE (OUTPATIENT)
Dept: FAMILY MEDICINE CLINIC | Age: 50
End: 2025-08-04

## 2025-08-04 DIAGNOSIS — U07.1 COVID-19 VIRUS INFECTION: Primary | ICD-10-CM

## 2025-08-31 DIAGNOSIS — E11.21 TYPE 2 DIABETES MELLITUS WITH DIABETIC NEPHROPATHY, WITHOUT LONG-TERM CURRENT USE OF INSULIN (HCC): Primary | ICD-10-CM

## 2025-08-31 DIAGNOSIS — E66.813 OBESITY, CLASS III, BMI 40-49.9 (MORBID OBESITY) (HCC): ICD-10-CM

## 2025-08-31 RX ORDER — SEMAGLUTIDE 0.68 MG/ML
0.5 INJECTION, SOLUTION SUBCUTANEOUS
Qty: 3 ML | Refills: 0 | Status: CANCELLED | OUTPATIENT
Start: 2025-08-31 | End: 2025-09-28

## (undated) DEVICE — STRIP,CLOSURE,WOUND,MEDI-STRIP,1/2X4: Brand: MEDLINE

## (undated) DEVICE — GAUZE,SPONGE,2"X2",8PLY,STERILE,LF,2'S: Brand: MEDLINE

## (undated) DEVICE — BITE BLOCK ENDOSCP AD 60 FR W/ ADJ STRP PLAS GRN BLOX

## (undated) DEVICE — COVER LT HNDL BLU PLAS

## (undated) DEVICE — C-ARM: Brand: UNBRANDED

## (undated) DEVICE — Device

## (undated) DEVICE — APPLIER CLP M/L SHFT DIA5MM 15 LIG LIGAMAX 5

## (undated) DEVICE — NEEDLE HYPO 25GA L1.5IN BVL ORIENTED ECLIPSE

## (undated) DEVICE — ENDOSCOPY KIT: Brand: MEDLINE INDUSTRIES, INC.

## (undated) DEVICE — TROCARS: Brand: KII® BALLOON BLUNT TIP SYSTEM

## (undated) DEVICE — SOLUTION IV IRRIG POUR BRL 0.9% SODIUM CHL 2F7124

## (undated) DEVICE — ADTEC SINGLE USE HOOK SCISSORS, SHAFT ONLY, MONOPOLAR, STRAIGHT, WORKING LENGTH: 12 1/4", (310 MM), DIAM. 5 MM, BLUNT/BLUNT, INSULATED, SINGLE ACTION, STERILE, DISPOSABLE, PACKAGE OF 10 PIECES: Brand: AESCULAP

## (undated) DEVICE — GLOVE ORANGE PI 7   MSG9070

## (undated) DEVICE — 3M™ STERI-STRIP™ COMPOUND BENZOIN TINCTURE 40 BAGS/CARTON 4 CARTONS/CASE C1544: Brand: 3M™ STERI-STRIP™

## (undated) DEVICE — SUTURE VCRL + SZ 0 L27IN ABSRB VLT L26MM UR-6 5/8 CIR VCP603H

## (undated) DEVICE — SET INSUF TUBE HEAT ISO CONN DISP

## (undated) DEVICE — TROCAR: Brand: KII FIOS FIRST ENTRY

## (undated) DEVICE — INDICATED FOR USE DURING OPEN AND LAPAROSCOPIC CHOLECYSTECTOMY PROCEDURES TO INJECT RADIOPAQUE MEDIA THROUGH THE CYSTIC DUCT INTO THE BILIARY TREE.: Brand: AEROSTAT®

## (undated) DEVICE — GARMENT COMPR STD FOR 17IN CALF UNIF THER FLOTRN

## (undated) DEVICE — GENERAL LAPAROSCOPY: Brand: MEDLINE INDUSTRIES, INC.

## (undated) DEVICE — SUTURE VCRL + SZ 4-0 L18IN ABSRB UD L19MM PS-2 3/8 CIR PRIM VCP496H

## (undated) DEVICE — TROCAR: Brand: KII SLEEVE

## (undated) DEVICE — SYRINGE MED 30ML STD CLR PLAS LUERLOCK TIP N CTRL DISP

## (undated) DEVICE — SYRINGE 20ML LL S/C 50

## (undated) DEVICE — 3M™ TEGADERM™ TRANSPARENT FILM DRESSING FRAME STYLE, 1624W, 2-3/8 IN X 2-3/4 IN (6 CM X 7 CM), 100/CT 4CT/CASE: Brand: 3M™ TEGADERM™

## (undated) DEVICE — SOLUTION IV 1000ML 0.9% SOD CHL FOR IRRIG PLAS CONT